# Patient Record
Sex: MALE | Race: WHITE | NOT HISPANIC OR LATINO | Employment: OTHER | URBAN - NONMETROPOLITAN AREA
[De-identification: names, ages, dates, MRNs, and addresses within clinical notes are randomized per-mention and may not be internally consistent; named-entity substitution may affect disease eponyms.]

---

## 2017-01-03 ENCOUNTER — LAB (OUTPATIENT)
Dept: FAMILY MEDICINE CLINIC | Facility: CLINIC | Age: 67
End: 2017-01-03

## 2017-01-03 DIAGNOSIS — Z79.01 LONG TERM (CURRENT) USE OF ANTICOAGULANTS: ICD-10-CM

## 2017-01-03 DIAGNOSIS — Z79.899 ENCOUNTER FOR LONG-TERM (CURRENT) DRUG USE: Primary | ICD-10-CM

## 2017-01-03 LAB
INR PPP: 1.16 (ref 0.8–1.1)
PROTHROMBIN TIME: 13.1 SECONDS (ref 9.8–11.9)

## 2017-01-03 PROCEDURE — 36415 COLL VENOUS BLD VENIPUNCTURE: CPT | Performed by: PHYSICIAN ASSISTANT

## 2017-01-05 ENCOUNTER — TELEPHONE (OUTPATIENT)
Dept: FAMILY MEDICINE CLINIC | Facility: CLINIC | Age: 67
End: 2017-01-05

## 2017-01-05 NOTE — TELEPHONE ENCOUNTER
----- Message from Edy Kline MD sent at 1/4/2017  4:33 PM EST -----  Friday am - thanks    ----- Message -----     From: Thania Marrufo MA     Sent: 1/3/2017  10:44 AM       To: Edy Kline MD    Request you not arpn.. Was in er this weekend and would like to see you Thursday or Friday for a fu regarding khoury requesting gallbladder surgery???

## 2017-01-06 ENCOUNTER — TELEPHONE (OUTPATIENT)
Dept: FAMILY MEDICINE CLINIC | Facility: CLINIC | Age: 67
End: 2017-01-06

## 2017-01-06 NOTE — TELEPHONE ENCOUNTER
I spoke with Mr. Toribio's wife regarding his INR drawn on 1/3/2017 which was a little over 1.1.  She states that he had to go to the emergency room for another issue on Friday and his INR was greater than 4 on 5 mg of Coumadin daily.  He was advised at the emergency room to hold his Coumadin and have it rechecked on Tuesday at which point it was 1.1.  I advised that he restart 4 mg Coumadin daily and recheck INR in 1 week.

## 2017-01-17 ENCOUNTER — OFFICE VISIT (OUTPATIENT)
Dept: FAMILY MEDICINE CLINIC | Facility: CLINIC | Age: 67
End: 2017-01-17

## 2017-01-17 VITALS
TEMPERATURE: 98.6 F | SYSTOLIC BLOOD PRESSURE: 115 MMHG | HEIGHT: 68 IN | BODY MASS INDEX: 27.28 KG/M2 | WEIGHT: 180 LBS | DIASTOLIC BLOOD PRESSURE: 70 MMHG | OXYGEN SATURATION: 96 % | HEART RATE: 95 BPM

## 2017-01-17 DIAGNOSIS — J44.1 CHRONIC OBSTRUCTIVE PULMONARY DISEASE WITH ACUTE EXACERBATION (HCC): Primary | ICD-10-CM

## 2017-01-17 DIAGNOSIS — M54.9 MECHANICAL BACK PAIN: ICD-10-CM

## 2017-01-17 DIAGNOSIS — I10 ESSENTIAL HYPERTENSION: ICD-10-CM

## 2017-01-17 DIAGNOSIS — I25.10 CORONARY ARTERY DISEASE INVOLVING NATIVE CORONARY ARTERY OF NATIVE HEART WITHOUT ANGINA PECTORIS: ICD-10-CM

## 2017-01-17 PROCEDURE — 99214 OFFICE O/P EST MOD 30 MIN: CPT | Performed by: PHYSICIAN ASSISTANT

## 2017-01-17 RX ORDER — PREDNISONE 20 MG/1
40 TABLET ORAL DAILY
Qty: 10 TABLET | Refills: 0 | Status: SHIPPED | OUTPATIENT
Start: 2017-01-17 | End: 2017-02-13

## 2017-01-17 RX ORDER — AZITHROMYCIN 250 MG/1
TABLET, FILM COATED ORAL
Qty: 6 TABLET | Refills: 0 | Status: SHIPPED | OUTPATIENT
Start: 2017-01-17 | End: 2017-02-13

## 2017-01-17 NOTE — MR AVS SNAPSHOT
Adria Zuñiga   1/17/2017 11:20 AM   Office Visit    Dept Phone:  [phone]   Encounter #:  [id number]    Provider:  SHIRA Maravilla   Department:  [facility]                Your Full Care Plan              Today's Medication Changes          These changes are accurate as of: 1/17/17 12:08 PM.  If you have any questions, ask your nurse or doctor.               New Medication(s)Ordered:     azithromycin 250 MG tablet   Commonly known as:  ZITHROMAX   Take 2 tablets the first day, then 1 tablet daily for 4 days.   Started by:  SHIRA Maravilla       Fluticasone Furoate-Vilanterol 100-25 MCG/INH aerosol powder    Commonly known as:  BREO ELLIPTA   1 puff every morning   Started by:  SHIRA Maravilla       predniSONE 20 MG tablet   Commonly known as:  DELTASONE   Take 2 tablets by mouth Daily.   Started by:  SHIRA Maravilla            Where to Get Your Medications      These medications were sent to [address] [place] [address]  [phone] [facility] [phone] [address] [place] [address] [address]     Phone:  [phone]     azithromycin 250 MG tablet    Fluticasone Furoate-Vilanterol 100-25 MCG/INH aerosol powder     predniSONE 20 MG tablet                  Your Updated Medication List          This list is accurate as of: 1/17/17 12:08 PM.  Always use your most recent med list.                aspirin 81 MG chewable tablet   Chew 1 tablet daily.       atorvastatin 80 MG tablet   Commonly known as:  LIPITOR   Take 1 tablet by mouth Every Night.       azithromycin 250 MG tablet   Commonly known as:  ZITHROMAX   Take 2 tablets the first day, then 1 tablet daily for 4 days.       carvedilol 12.5 MG tablet   Commonly known as:  COREG   Take 1 tablet by mouth 2 (Two) Times a Day With Meals.       DULoxetine 60 MG capsule   Commonly known as:  CYMBALTA   Take 1 capsule by mouth  "daily.       esomeprazole 40 MG capsule   Commonly known as:  nexIUM   Take 1 capsule by mouth 2 (two) times a day.       ferrous sulfate 325 (65 FE) MG tablet   Take 1 tablet by mouth 2 (two) times a day.       Fluticasone Furoate-Vilanterol 100-25 MCG/INH aerosol powder    Commonly known as:  BREO ELLIPTA   1 puff every morning       insulin lispro 100 UNIT/ML injection   Commonly known as:  humaLOG   Inject 10 Units under the skin 3 (Three) Times a Day Before Meals.       Insulin Syringe 28G X 1/2\" 0.5 ML misc       Lancet Device [facility]       LANTUS 100 UNIT/ML injection   Generic drug:  insulin glargine   Inject 40 Units under the skin Daily.       metFORMIN 1000 MG tablet   Commonly known as:  GLUCOPHAGE   Take 1 tablet by mouth 2 (two) times a day with meals.       oxyMORphone ER 20 MG tablet extended-release 12 hour 12 hr tablet   Commonly known as:  OPANA ER   Take 1 tablet by mouth 2 (Two) Times a Day.       Pen Needles 5/16\" 30G X 8 MM misc       predniSONE 20 MG tablet   Commonly known as:  DELTASONE   Take 2 tablets by mouth Daily.       spironolactone 25 MG tablet   Commonly known as:  ALDACTONE   Take 1 tablet by mouth daily. 1/2 TABLET EVERY MORNING       * warfarin 4 MG tablet   Commonly known as:  COUMADIN   Take 1 tablet by mouth Daily.       * warfarin 1 MG tablet   Commonly known as:  COUMADIN   Take 1 tablet by mouth Daily.       * Notice:  This list has 2 medication(s) that are the same as other medications prescribed for you. Read the directions carefully, and ask your doctor or other care provider to review them with you.            You Were Diagnosed With        Codes Comments    Chronic obstructive pulmonary disease with acute exacerbation    -  Primary ICD-10-CM: J44.1  ICD-9-CM: 491.21       Instructions     None    Patient Instructions History      Upcoming Appointments     Visit Type Date Time Department    OFFICE VISIT 1/17/2017 11:20 AM [facility]    OFFICE VISIT 2/13/2017  " "1:30 PM [facility]    OFFICE VISIT 3/14/2017 11:20 AM [facility]      MyChart Signup     Our records indicate that you have declined [facility] sign[facility]. If you would like to sign up for [employer], please email [email or web address] or call [phone] to obtain an activation code.             Other Info from Your Visit           Your Appointments     Feb 13, 2017  1:30 PM EST   Office Visit with Edy Kline MD   [facility] (--)    [address] [place] [id number] [phone]           Please arrive 10 minutes early. Bring a complete list of all medications and bring any previous records or diagnostic testing results.            Mar 14, 2017 11:20 AM EDT   Office Visit with SHIRA Maravilla   [facility] (--)    [address] [place] [id number] [phone]           Please arrive 10 minutes early, bring a complete list of all medications and bring any previous records or diagnostic testing results.              Allergies     No Known Allergies      Vital Signs     Blood Pressure Pulse Temperature Height Weight Oxygen Saturation    115/70 (BP Location: Left arm, Patient Position: Sitting, Cuff Size: Adult) 95 98.6 °F (37 °C) (Oral) 67.5\" (171.5 cm) 180 lb (81.6 kg) 96%    Body Mass Index Smoking Status                27.78 kg/m2 Current Every Day Smoker          Problems and Diagnoses Noted     Chronic obstructive pulmonary disease with acute exacerbation        "

## 2017-01-17 NOTE — PROGRESS NOTES
"Katie Zuñiga is a 66 y.o. male.     History of Present Illness     Acute illness-  He complains of productive cough with thick green sputum, chest and nasal congestion, headache, and body aches for the past 2 weeks.    COPD-acutely exacerbated    Chronic back pain -  Pain is not related to a specific injury.  Symptoms include back pain,stiffness, and decreased ROJM.  He denies urinary incontinence or fecal incontinence.  Pain is located int he low back.  Radiation to left leg. Pain is described as varying from mild to severe burning and throbbing. Onset x years.  Symptoms exacerbated by standing and walking.  Symptoms alleviated by rest and opioid analgesics.  Associated symptoms include sexual dysfunction.  Pain causes functional limitation including general activity, mood, walking ability, work, and activities of daily living.    9/4/09 MRI lumbar spine = disc bulging L4/5 and L5/S1 with central protrusion of disc and facet arthropathy involving L3-S1    CAD - stable    Hypertension - stable    Visit Vitals   • /70 (BP Location: Left arm, Patient Position: Sitting, Cuff Size: Adult)   • Pulse 95   • Temp 98.6 °F (37 °C) (Oral)   • Ht 67.5\" (171.5 cm)   • Wt 180 lb (81.6 kg)   • SpO2 96%   • BMI 27.78 kg/m2       The following portions of the patient's history were reviewed and updated as appropriate: allergies, current medications, past family history, past medical history, past social history, past surgical history and problem list.    Review of Systems   Constitutional: Negative for activity change, appetite change and fever.   HENT: Positive for congestion. Negative for ear pain, sinus pressure and sore throat.    Eyes: Negative for pain and visual disturbance.   Respiratory: Positive for cough and wheezing. Negative for chest tightness.    Cardiovascular: Negative for chest pain and palpitations.   Gastrointestinal: Negative for abdominal pain, constipation, diarrhea, nausea and vomiting. " "  Endocrine: Negative for polydipsia and polyuria.   Genitourinary: Negative for dysuria and frequency.   Musculoskeletal: Positive for back pain. Negative for myalgias.   Skin: Negative for color change and rash.             Allergic/Immunologic: Negative for food allergies and immunocompromised state.   Neurological: Negative for dizziness, syncope and headaches.   Hematological: Negative for adenopathy. Does not bruise/bleed easily.   Psychiatric/Behavioral: Negative for hallucinations and suicidal ideas. The patient is not nervous/anxious.        Visit Vitals   • /70 (BP Location: Left arm, Patient Position: Sitting, Cuff Size: Adult)   • Pulse 95   • Temp 98.6 °F (37 °C) (Oral)   • Ht 67.5\" (171.5 cm)   • Wt 180 lb (81.6 kg)   • SpO2 96%   • BMI 27.78 kg/m2       Objective   Physical Exam   Constitutional: He is oriented to person, place, and time. He appears well-developed and well-nourished.   HENT:   Head: Normocephalic and atraumatic.   Nose: Nose normal.   Mouth/Throat: Oropharynx is clear and moist.   Eyes: Conjunctivae and EOM are normal. Pupils are equal, round, and reactive to light.   Neck: Normal range of motion. Neck supple. No tracheal deviation present. No thyromegaly present.   Cardiovascular: Normal rate, regular rhythm, normal heart sounds and intact distal pulses.    No murmur heard.  Pulmonary/Chest: Effort normal. No respiratory distress. He has wheezes (bilateral).   Abdominal: Soft. Bowel sounds are normal. There is no tenderness. There is no guarding.   Musculoskeletal: Normal range of motion. He exhibits tenderness (lumbar musculature diffusely tender). He exhibits no edema.   Lymphadenopathy:     He has no cervical adenopathy.   Neurological: He is alert and oriented to person, place, and time.   Skin: Skin is warm and dry. No rash noted.   Psychiatric: He has a normal mood and affect. His behavior is normal.   Nursing note and vitals reviewed.      Assessment/Plan   Diagnoses and " all orders for this visit:    Chronic obstructive pulmonary disease with acute exacerbation  -     azithromycin (ZITHROMAX) 250 MG tablet; Take 2 tablets the first day, then 1 tablet daily for 4 days.  -     predniSONE (DELTASONE) 20 MG tablet; Take 2 tablets by mouth Daily.  -     Fluticasone Furoate-Vilanterol (BREO ELLIPTA) 100-25 MCG/INH aerosol powder ; 1 puff every morning    Mechanical back pain    Coronary artery disease involving native coronary artery of native heart without angina pectoris    Essential hypertension    Refill Opana ER 20 mg 1 tablet twice a day #60 no refills   written by Dr. Raisa Kirk #[id number] Reviewed and is consistent.  UDS 12/20/2016 reviewed and is consistent.  Patient comfort assessment guide reviewed and updated today.    As part of the patient's treatment plan they are being prescribed a controlled substance/ substances with potential for abuse.  This patient has been made aware of the appropriate use of such medications, including potential risk of somnolence, limited ability to drive and/or work safely, and potential for overdose.  It has also been made clear these medications are for use by the patient only, without concomitant use of alcohol or other substances unless prescribed/advised by medical provider.    Patient has completed prescribing agreement detailing terms of continued prescribing of controlled substances including monitoring DANAE reports, urine drug screens, and pill counts.  The patient is aware DANAE reports are reviewed on a regular basis and scanned into the chart.    History and physical exam exhibit continued safe and appropriate use of controlled substances.

## 2017-02-09 ENCOUNTER — LAB (OUTPATIENT)
Dept: FAMILY MEDICINE CLINIC | Facility: CLINIC | Age: 67
End: 2017-02-09

## 2017-02-09 DIAGNOSIS — D50.9 IRON DEFICIENCY ANEMIA, UNSPECIFIED: Primary | ICD-10-CM

## 2017-02-09 DIAGNOSIS — R79.9 ABNORMAL BLOOD CHEMISTRY: ICD-10-CM

## 2017-02-09 DIAGNOSIS — Z79.01 LONG TERM (CURRENT) USE OF ANTICOAGULANTS: ICD-10-CM

## 2017-02-09 PROCEDURE — 36415 COLL VENOUS BLD VENIPUNCTURE: CPT | Performed by: GENERAL PRACTICE

## 2017-02-10 LAB
BASOPHILS # BLD AUTO: 0.01 10*3/MM3 (ref 0–0.3)
BASOPHILS NFR BLD AUTO: 0.2 % (ref 0–2)
EOSINOPHIL # BLD AUTO: 0.11 10*3/MM3 (ref 0–0.7)
EOSINOPHIL NFR BLD AUTO: 1.7 % (ref 0–7)
ERYTHROCYTE [DISTWIDTH] IN BLOOD BY AUTOMATED COUNT: 22.7 % (ref 11.5–14.5)
FERRITIN SERPL-MCNC: 20 NG/ML (ref 21.9–321.7)
HCT VFR BLD AUTO: 35.1 % (ref 42–52)
HGB BLD-MCNC: 9.6 G/DL (ref 14–18)
IMM GRANULOCYTES # BLD: 0.03 10*3/MM3 (ref 0–0.03)
IMM GRANULOCYTES NFR BLD: 0.5 % (ref 0–0.5)
INR PPP: 2.04 (ref 0.8–1.1)
IRON SATN MFR SERPL: 8 % (ref 20–50)
IRON SERPL-MCNC: 32 MCG/DL (ref 53–167)
LYMPHOCYTES # BLD AUTO: 1.14 10*3/MM3 (ref 1–3)
LYMPHOCYTES NFR BLD AUTO: 17.8 % (ref 16–46)
MCH RBC QN AUTO: 19.2 PG (ref 27–33)
MCHC RBC AUTO-ENTMCNC: 27.4 G/DL (ref 33–37)
MCV RBC AUTO: 70.3 FL (ref 80–94)
MONOCYTES # BLD AUTO: 0.67 10*3/MM3 (ref 0.1–0.9)
MONOCYTES NFR BLD AUTO: 10.5 % (ref 0–12)
NEUTROPHILS # BLD AUTO: 4.44 10*3/MM3 (ref 1.4–6.5)
NEUTROPHILS NFR BLD AUTO: 69.3 % (ref 40–75)
PLATELET # BLD AUTO: 372 10*3/MM3 (ref 130–400)
PLATELET BLD QL SMEAR: NORMAL
PROTHROMBIN TIME: 23.2 SECONDS (ref 9.8–11.9)
RBC # BLD AUTO: 4.99 10*6/MM3 (ref 4.7–6.1)
RBC MORPH BLD: NORMAL
TIBC SERPL-MCNC: 422 MCG/DL (ref 241–421)
TRANSFERRIN SERPL-MCNC: 315 MG/DL (ref 200–370)
UIBC SERPL-MCNC: 390 MCG/DL (ref 112–346)
WBC # BLD AUTO: 6.4 10*3/MM3 (ref 4.5–12.5)

## 2017-02-11 RX ORDER — FERROUS SULFATE 325(65) MG
325 TABLET ORAL
Qty: 90 TABLET | Refills: 5 | Status: SHIPPED | OUTPATIENT
Start: 2017-02-11 | End: 2017-08-18 | Stop reason: SDUPTHER

## 2017-02-13 ENCOUNTER — OFFICE VISIT (OUTPATIENT)
Dept: FAMILY MEDICINE CLINIC | Facility: CLINIC | Age: 67
End: 2017-02-13

## 2017-02-13 VITALS
OXYGEN SATURATION: 99 % | BODY MASS INDEX: 27.13 KG/M2 | SYSTOLIC BLOOD PRESSURE: 120 MMHG | TEMPERATURE: 97.7 F | HEIGHT: 68 IN | RESPIRATION RATE: 12 BRPM | WEIGHT: 179 LBS | HEART RATE: 102 BPM | DIASTOLIC BLOOD PRESSURE: 65 MMHG

## 2017-02-13 DIAGNOSIS — I10 ESSENTIAL HYPERTENSION: Primary | ICD-10-CM

## 2017-02-13 DIAGNOSIS — J30.9 CHRONIC ALLERGIC RHINITIS: ICD-10-CM

## 2017-02-13 DIAGNOSIS — E78.2 MIXED HYPERLIPIDEMIA: ICD-10-CM

## 2017-02-13 DIAGNOSIS — F17.200 SMOKER: ICD-10-CM

## 2017-02-13 DIAGNOSIS — E11.42 TYPE 2 DIABETES MELLITUS WITH PERIPHERAL NEUROPATHY (HCC): ICD-10-CM

## 2017-02-13 DIAGNOSIS — D50.8 OTHER IRON DEFICIENCY ANEMIA: ICD-10-CM

## 2017-02-13 DIAGNOSIS — K21.9 GASTROESOPHAGEAL REFLUX DISEASE WITHOUT ESOPHAGITIS: ICD-10-CM

## 2017-02-13 DIAGNOSIS — N52.01 ERECTILE DYSFUNCTION DUE TO ARTERIAL INSUFFICIENCY: ICD-10-CM

## 2017-02-13 DIAGNOSIS — K57.30 DIVERTICULOSIS OF LARGE INTESTINE WITHOUT HEMORRHAGE: ICD-10-CM

## 2017-02-13 DIAGNOSIS — J44.1 CHRONIC OBSTRUCTIVE PULMONARY DISEASE WITH ACUTE EXACERBATION (HCC): ICD-10-CM

## 2017-02-13 DIAGNOSIS — I25.10 CORONARY ARTERY DISEASE INVOLVING NATIVE CORONARY ARTERY OF NATIVE HEART WITHOUT ANGINA PECTORIS: ICD-10-CM

## 2017-02-13 DIAGNOSIS — M54.9 MECHANICAL BACK PAIN: ICD-10-CM

## 2017-02-13 DIAGNOSIS — F41.9 CHRONIC ANXIETY: ICD-10-CM

## 2017-02-13 DIAGNOSIS — I50.22 CHRONIC SYSTOLIC CONGESTIVE HEART FAILURE (HCC): ICD-10-CM

## 2017-02-13 DIAGNOSIS — E55.9 VITAMIN D DEFICIENCY: ICD-10-CM

## 2017-02-13 PROCEDURE — 99214 OFFICE O/P EST MOD 30 MIN: CPT | Performed by: GENERAL PRACTICE

## 2017-02-13 NOTE — PROGRESS NOTES
Katie Zuñiga is a 66 y.o. male.     History of Present Illness     Congestive Heart Failure  Patient has a history of congestive heart failure with an estimated EF of 10-20% complicated by a previous apical mural thrombus.. Patient's current complaints are dyspnea with exertion and fatigue. He denies dyspnea at rest, orthopnea, paroxysmal nocturnal dyspnea, chest pain and palpitations. Current medications include beta-blocker, spironolactone, and coumadin. He states he is compliant most of the time with his medications. He states he is noncompliant some of the time with his diet. He has been unable to follow-up with [facility] cardiology but apparently has an appointment scheduled for early next week.  Most recent hemoglobin 9.6 with an MCV of 70.3, serum iron of 32, and ferritin of 20.  He does not appear to be taking his iron supplementation at present    Coronary artery disease  He has a history of CABG and CHF. Previous diagnostic testing includes: cardiac catheterization Recent history: taking medications as instructed, no medication side effects noted, no TIA's, no chest pain on exertion, notes stable dyspnea on exertion, no change and no swelling of ankles. Patient's symptoms have been unchanged. Medication side effects include: none.    Diabetes  Current symptoms include none. Patient denies paresthesia of the feet, visual disturbances, polydipsia, polyuria, hypoglycemia and foot ulcerations. Evaluation to date has been: fasting blood sugar and hemoglobin A1C. Home sugars: BGs are high in the evening. Current treatments:metformin, basal insulin - lantus, and bolus insulin - humalog. He generally eats cheerios for breakfast and then snacks on chips and popcorn the remainder of the day. Last dilated eye exam within one year.      Dyslipidemia  Compliance with treatment has been poor. The patient exercises infrequently. He is currently being prescribed the following medication for his dyslipidemia -  atorvastatin.     The following portions of the patient's history were reviewed and updated as appropriate: allergies, current medications, past medical history, past social history, past surgical history and problem list.    Review of Systems   Constitutional: Positive for fatigue. Negative for appetite change, chills, fever and unexpected weight change.   HENT: Negative for congestion, ear pain, rhinorrhea, sneezing, sore throat and voice change.    Eyes: Negative for visual disturbance.   Respiratory: Positive for cough, shortness of breath and wheezing.    Cardiovascular: Negative for chest pain, palpitations and leg swelling.   Gastrointestinal: Negative for abdominal pain, blood in stool, constipation, diarrhea, nausea and vomiting.   Endocrine: Negative for polydipsia and polyuria.   Genitourinary: Negative for difficulty urinating, dysuria, frequency, hematuria and urgency.   Musculoskeletal: Positive for back pain. Negative for arthralgias, joint swelling, myalgias and neck pain.   Skin: Negative for color change.   Neurological: Negative for tremors, weakness, numbness and headaches.   Psychiatric/Behavioral: Negative for dysphoric mood, sleep disturbance and suicidal ideas. The patient is not nervous/anxious.      Objective   Physical Exam   Constitutional: He is oriented to person, place, and time. He appears well-developed and well-nourished. He is cooperative. He does not have a sickly appearance. No distress.   In fair spirits.  No apparent distress.  No pallor, cyanosis, diaphoresis, or jaundice.   HENT:   Head: Atraumatic.   Right Ear: Tympanic membrane, external ear and ear canal normal.   Left Ear: Tympanic membrane, external ear and ear canal normal.   Mouth/Throat: Oropharynx is clear and moist.   Eyes: EOM are normal. Pupils are equal, round, and reactive to light. No scleral icterus.   Neck: No JVD present. Carotid bruit is not present. No tracheal deviation present. No thyromegaly present.    Cardiovascular: Normal rate, regular rhythm, S1 normal, S2 normal, normal heart sounds and intact distal pulses.  Exam reveals no gallop.    No murmur heard.  Pulmonary/Chest: No accessory muscle usage. No respiratory distress. He has decreased breath sounds in the right lower field and the left lower field. He has wheezes (diffuse-primarily with forced expiration). He has no rales.   Mild pulmonary hyperinflation   Abdominal: Soft. Normal aorta and bowel sounds are normal. He exhibits no abdominal bruit and no mass. There is no hepatosplenomegaly. There is no tenderness. No hernia.   Musculoskeletal: He exhibits no tenderness or deformity.        Lumbar back: He exhibits decreased range of motion. He exhibits no tenderness and no deformity.   Negative straight leg raise   Lymphadenopathy:        Head (right side): No submandibular adenopathy present.        Head (left side): No submandibular adenopathy present.     He has no cervical adenopathy.   Neurological: He is alert and oriented to person, place, and time. He has normal strength and normal reflexes. He displays normal reflexes. A sensory deficit (decreased vibration sense both feet) is present. No cranial nerve deficit. He exhibits normal muscle tone. Coordination normal.   Skin: Skin is warm and dry. No rash noted. He is not diaphoretic. No pallor. Nails show no clubbing.   Psychiatric: He has a normal mood and affect. His behavior is normal.     Assessment/Plan   Problems Addressed this Visit        Cardiovascular and Mediastinum    Essential hypertension - Primary  Hypertension: at goal. Evidence of target organ damage: angina/ prior myocardial infarction, heart failure and prior coronary revascularization.  Encouraged to continue to work on diet and exercise plan.   Continue current medication  Fasting lab work will be updated in 8 weeks    Relevant Orders    Comprehensive Metabolic Panel    Mixed hyperlipidemia  As above. Continue current medication.     Relevant Orders    Lipid Panel    TSH    CAD (coronary artery disease)  Coronary artery disease is stable.  Reminded regarding the importance of lifestyle modification.  Stop smoking.  Continue current treatment.    Chronic systolic congestive heart failure  Congestive heart failure due to coronary artery disease (CAD) and systolic dysfunction and has been complicated by a previous apical wall mural thrombus.  Reminded regarding the importance of lifestyle modification.  Recommended a trial of cardiopulmonary rehabilitation.  Patient would like to consider  Salt avoidance.  Stop smoking.  Continue current treatment.  Reminded to follow up with cardiology particularly for an opinion as to whether coumadin needs to be continued.  Will consider resuming an ACE at his return       Respiratory    Chronic obstructive pulmonary disease with acute exacerbation  COPD is stable.  Reminded of the importance of smoking cessation  Encouraged to remain as active as symptoms allow for    Chronic allergic rhinitis       Digestive    Vitamin D deficiency    Relevant Orders    Vitamin D 25 Hydroxy    Gastroesophageal reflux disease without esophagitis    Diverticulosis of large intestine       Endocrine    Type 2 diabetes mellitus with peripheral neuropathy  Diabetes mellitus Type II, under unsatisfactory control.   Encouraged to continue to pursue ADA diet  Encouraged aerobic exercise.  Reminded to get yearly retinal exam.    Relevant Orders    Hemoglobin A1c    MicroAlbumin, Urine, Random       Nervous and Auditory    Mechanical back pain  Reminded regarding symptomatic treatment.   Will continue current treatment.    Relevant Medications    oxyMORphone ER (OPANA ER) 20 MG tablet extended-release 12 hour 12 hr tablet       Hematopoietic and Hemostatic    Iron deficiency anemia  Encouraged to resume iron supplementation     Relevant Orders    CBC & Differential    Iron    Transferrin    Ferritin       Other    Chronic anxiety     Vasculogenic erectile dysfunction    Smoker

## 2017-02-16 NOTE — PROGRESS NOTES
Called pt about the following per dr. Kline. VH    -- Mr varghese should increase his ferrous sulfate to tid

## 2017-03-07 DIAGNOSIS — E11.42 TYPE 2 DIABETES MELLITUS WITH PERIPHERAL NEUROPATHY (HCC): ICD-10-CM

## 2017-03-07 DIAGNOSIS — F41.9 CHRONIC ANXIETY: ICD-10-CM

## 2017-03-08 RX ORDER — DULOXETIN HYDROCHLORIDE 60 MG/1
60 CAPSULE, DELAYED RELEASE ORAL DAILY
Qty: 30 CAPSULE | Refills: 5 | Status: SHIPPED | OUTPATIENT
Start: 2017-03-08 | End: 2017-08-18 | Stop reason: SDUPTHER

## 2017-03-13 DIAGNOSIS — M54.9 MECHANICAL BACK PAIN: ICD-10-CM

## 2017-03-14 ENCOUNTER — OFFICE VISIT (OUTPATIENT)
Dept: FAMILY MEDICINE CLINIC | Facility: CLINIC | Age: 67
End: 2017-03-14

## 2017-03-14 VITALS
HEART RATE: 83 BPM | OXYGEN SATURATION: 98 % | BODY MASS INDEX: 29.1 KG/M2 | WEIGHT: 192 LBS | DIASTOLIC BLOOD PRESSURE: 74 MMHG | TEMPERATURE: 98 F | HEIGHT: 68 IN | SYSTOLIC BLOOD PRESSURE: 128 MMHG

## 2017-03-14 DIAGNOSIS — I10 ESSENTIAL HYPERTENSION: ICD-10-CM

## 2017-03-14 DIAGNOSIS — Z79.899 LONG TERM USE OF DRUG: ICD-10-CM

## 2017-03-14 DIAGNOSIS — M54.9 MECHANICAL BACK PAIN: Primary | ICD-10-CM

## 2017-03-14 DIAGNOSIS — I25.10 CORONARY ARTERY DISEASE INVOLVING NATIVE CORONARY ARTERY OF NATIVE HEART WITHOUT ANGINA PECTORIS: ICD-10-CM

## 2017-03-14 PROCEDURE — 99214 OFFICE O/P EST MOD 30 MIN: CPT | Performed by: PHYSICIAN ASSISTANT

## 2017-03-14 NOTE — PROGRESS NOTES
"Katie Zuñiga is a 66 y.o. male.     History of Present Illness     Chronic back pain -  Pain is not related to a specific injury.  Symptoms include back pain,stiffness, and decreased ROJM.  He denies urinary incontinence or fecal incontinence.  Pain is located int he low back.  Radiation to left leg. Pain is described as varying from mild to severe burning and throbbing. Onset x years.  Symptoms exacerbated by standing and walking.  Symptoms alleviated by rest and opioid analgesics.  Associated symptoms include sexual dysfunction.  Pain causes functional limitation including general activity, mood, walking ability, work, and activities of daily living.    9/4/09 MRI lumbar spine = disc bulging L4/5 and L5/S1 with central protrusion of disc and facet arthropathy involving L3-S1    CAD - stable    Hypertension - stable    Visit Vitals   • /74 (BP Location: Left arm, Patient Position: Sitting, Cuff Size: Adult)   • Pulse 83   • Temp 98 °F (36.7 °C) (Oral)   • Ht 67.5\" (171.5 cm)   • Wt 192 lb (87.1 kg)   • SpO2 98%   • BMI 29.63 kg/m2       The following portions of the patient's history were reviewed and updated as appropriate: allergies, current medications, past family history, past medical history, past social history, past surgical history and problem list.    Review of Systems   Constitutional: Negative for activity change, appetite change and fever.   HENT: Negative for ear pain, sinus pressure and sore throat.    Eyes: Negative for pain and visual disturbance.   Respiratory: Negative for cough and chest tightness.    Cardiovascular: Negative for chest pain and palpitations.   Gastrointestinal: Negative for abdominal pain, constipation, diarrhea, nausea and vomiting.   Endocrine: Negative for polydipsia and polyuria.   Genitourinary: Negative for dysuria and frequency.   Musculoskeletal: Positive for back pain. Negative for myalgias.   Skin: Negative for color change and rash.           " "  Allergic/Immunologic: Negative for food allergies and immunocompromised state.   Neurological: Negative for dizziness, syncope and headaches.   Hematological: Negative for adenopathy. Does not bruise/bleed easily.   Psychiatric/Behavioral: Negative for hallucinations and suicidal ideas. The patient is not nervous/anxious.        Visit Vitals   • /74 (BP Location: Left arm, Patient Position: Sitting, Cuff Size: Adult)   • Pulse 83   • Temp 98 °F (36.7 °C) (Oral)   • Ht 67.5\" (171.5 cm)   • Wt 192 lb (87.1 kg)   • SpO2 98%   • BMI 29.63 kg/m2       Objective   Physical Exam   Constitutional: He is oriented to person, place, and time. He appears well-developed and well-nourished.   HENT:   Head: Normocephalic and atraumatic.   Nose: Nose normal.   Mouth/Throat: Oropharynx is clear and moist.   Eyes: Conjunctivae and EOM are normal. Pupils are equal, round, and reactive to light.   Neck: Normal range of motion. Neck supple. No tracheal deviation present. No thyromegaly present.   Cardiovascular: Normal rate, regular rhythm, normal heart sounds and intact distal pulses.    No murmur heard.  Pulmonary/Chest: Effort normal and breath sounds normal. No respiratory distress. He has no wheezes.   Abdominal: Soft. Bowel sounds are normal. There is no tenderness. There is no guarding.   Musculoskeletal: Normal range of motion. He exhibits tenderness (lumbar musculature diffusely tender). He exhibits no edema.   Lymphadenopathy:     He has no cervical adenopathy.   Neurological: He is alert and oriented to person, place, and time.   Skin: Skin is warm and dry. No rash noted.   Psychiatric: He has a normal mood and affect. His behavior is normal.   Nursing note and vitals reviewed.      Assessment/Plan   Diagnoses and all orders for this visit:    Mechanical back pain    Long term use of drug  -     Urine Drug Screen; Future    Coronary artery disease involving native coronary artery of native heart without angina " pectoris    Essential hypertension      Refill Opana ER 20 mg 1 tablet twice a day #60 no refills   written by Dr. Raisa Kirk #[id number]  Reviewed and is consistent.  UDS 12/20/16 reviewed and is consistent.  Patient comfort assessment guide reviewed and updated today.    As part of the patient's treatment plan they are being prescribed a controlled substance/ substances with potential for abuse.  This patient has been made aware of the appropriate use of such medications, including potential risk of somnolence, limited ability to drive and/or work safely, and potential for overdose.  It has also been made clear these medications are for use by the patient only, without concomitant use of alcohol or other substances unless prescribed/advised by medical provider.    Patient has completed prescribing agreement detailing terms of continued prescribing of controlled substances including monitoring DANAE reports, urine drug screens, and pill counts.  The patient is aware DANAE reports are reviewed on a regular basis and scanned into the chart.    History and physical exam exhibit continued safe and appropriate use of controlled substances.

## 2017-03-17 DIAGNOSIS — K21.9 GASTROESOPHAGEAL REFLUX DISEASE WITHOUT ESOPHAGITIS: ICD-10-CM

## 2017-03-17 RX ORDER — ESOMEPRAZOLE MAGNESIUM 40 MG/1
40 CAPSULE, DELAYED RELEASE ORAL 2 TIMES DAILY
Qty: 60 CAPSULE | Refills: 5 | Status: SHIPPED | OUTPATIENT
Start: 2017-03-17 | End: 2017-08-18 | Stop reason: SDUPTHER

## 2017-03-20 DIAGNOSIS — I25.10 CORONARY ARTERY DISEASE INVOLVING NATIVE CORONARY ARTERY OF NATIVE HEART WITHOUT ANGINA PECTORIS: Primary | ICD-10-CM

## 2017-03-20 DIAGNOSIS — I50.22 CHRONIC SYSTOLIC CONGESTIVE HEART FAILURE (HCC): ICD-10-CM

## 2017-03-31 DIAGNOSIS — I50.22 CHRONIC SYSTOLIC CONGESTIVE HEART FAILURE (HCC): ICD-10-CM

## 2017-03-31 RX ORDER — SPIRONOLACTONE 25 MG/1
25 TABLET ORAL DAILY
Qty: 15 TABLET | Refills: 5
Start: 2017-03-31 | End: 2017-08-18 | Stop reason: SDUPTHER

## 2017-04-10 DIAGNOSIS — M54.9 MECHANICAL BACK PAIN: ICD-10-CM

## 2017-04-11 ENCOUNTER — OFFICE VISIT (OUTPATIENT)
Dept: FAMILY MEDICINE CLINIC | Facility: CLINIC | Age: 67
End: 2017-04-11

## 2017-04-11 ENCOUNTER — LAB (OUTPATIENT)
Dept: FAMILY MEDICINE CLINIC | Facility: CLINIC | Age: 67
End: 2017-04-11

## 2017-04-11 VITALS
BODY MASS INDEX: 26.07 KG/M2 | HEIGHT: 68 IN | SYSTOLIC BLOOD PRESSURE: 126 MMHG | WEIGHT: 172 LBS | OXYGEN SATURATION: 98 % | TEMPERATURE: 97.4 F | DIASTOLIC BLOOD PRESSURE: 76 MMHG | HEART RATE: 66 BPM

## 2017-04-11 DIAGNOSIS — M54.9 MECHANICAL BACK PAIN: Primary | ICD-10-CM

## 2017-04-11 DIAGNOSIS — E78.2 MIXED HYPERLIPIDEMIA: ICD-10-CM

## 2017-04-11 DIAGNOSIS — D50.8 OTHER IRON DEFICIENCY ANEMIA: ICD-10-CM

## 2017-04-11 DIAGNOSIS — I25.10 CORONARY ARTERY DISEASE INVOLVING NATIVE CORONARY ARTERY OF NATIVE HEART WITHOUT ANGINA PECTORIS: ICD-10-CM

## 2017-04-11 DIAGNOSIS — E55.9 VITAMIN D DEFICIENCY: ICD-10-CM

## 2017-04-11 DIAGNOSIS — Z79.899 LONG TERM USE OF DRUG: ICD-10-CM

## 2017-04-11 DIAGNOSIS — E11.42 TYPE 2 DIABETES MELLITUS WITH PERIPHERAL NEUROPATHY (HCC): ICD-10-CM

## 2017-04-11 DIAGNOSIS — I10 ESSENTIAL HYPERTENSION: ICD-10-CM

## 2017-04-11 LAB
25(OH)D3 SERPL-MCNC: 23 NG/ML
ALBUMIN SERPL-MCNC: 4.6 G/DL (ref 3.4–4.8)
ALBUMIN/GLOB SERPL: 1.4 G/DL (ref 1.5–2.5)
ALP SERPL-CCNC: 64 U/L (ref 40–129)
ALT SERPL W P-5'-P-CCNC: 38 U/L (ref 10–44)
ANION GAP SERPL CALCULATED.3IONS-SCNC: 6.7 MMOL/L (ref 3.6–11.2)
ANISOCYTOSIS BLD QL: NORMAL
AST SERPL-CCNC: 33 U/L (ref 10–34)
BASOPHILS # BLD AUTO: 0.01 10*3/MM3 (ref 0–0.3)
BASOPHILS NFR BLD AUTO: 0.2 % (ref 0–2)
BILIRUB SERPL-MCNC: 0.4 MG/DL (ref 0.2–1.8)
BUN BLD-MCNC: 31 MG/DL (ref 7–21)
BUN/CREAT SERPL: 22 (ref 7–25)
CALCIUM SPEC-SCNC: 10.4 MG/DL (ref 7.7–10)
CHLORIDE SERPL-SCNC: 96 MMOL/L (ref 99–112)
CHOLEST SERPL-MCNC: 305 MG/DL (ref 0–200)
CO2 SERPL-SCNC: 32.3 MMOL/L (ref 24.3–31.9)
CREAT BLD-MCNC: 1.41 MG/DL (ref 0.43–1.29)
DEPRECATED RDW RBC AUTO: 58.6 FL (ref 37–54)
EOSINOPHIL # BLD AUTO: 0.24 10*3/MM3 (ref 0–0.7)
EOSINOPHIL NFR BLD AUTO: 3.6 % (ref 0–7)
ERYTHROCYTE [DISTWIDTH] IN BLOOD BY AUTOMATED COUNT: 23.2 % (ref 11.5–14.5)
FERRITIN SERPL-MCNC: 27 NG/ML (ref 21.9–321.7)
GFR SERPL CREATININE-BSD FRML MDRD: 50 ML/MIN/1.73
GLOBULIN UR ELPH-MCNC: 3.4 GM/DL
GLUCOSE BLD-MCNC: 219 MG/DL (ref 70–110)
HBA1C MFR BLD: 8.9 % (ref 4.5–5.7)
HCT VFR BLD AUTO: 47.5 % (ref 42–52)
HDLC SERPL-MCNC: 40 MG/DL (ref 60–100)
HGB BLD-MCNC: 13.9 G/DL (ref 14–18)
HYPOCHROMIA BLD QL: NORMAL
IMM GRANULOCYTES # BLD: 0.02 10*3/MM3 (ref 0–0.03)
IMM GRANULOCYTES NFR BLD: 0.3 % (ref 0–0.5)
IRON 24H UR-MRATE: 43 MCG/DL (ref 53–167)
LDLC SERPL CALC-MCNC: ABNORMAL MG/DL (ref 0–100)
LDLC/HDLC SERPL: ABNORMAL {RATIO}
LYMPHOCYTES # BLD AUTO: 1.68 10*3/MM3 (ref 1–3)
LYMPHOCYTES NFR BLD AUTO: 25.5 % (ref 16–46)
MCH RBC QN AUTO: 21.4 PG (ref 27–33)
MCHC RBC AUTO-ENTMCNC: 29.3 G/DL (ref 33–37)
MCV RBC AUTO: 73 FL (ref 80–94)
MICROCYTES BLD QL: NORMAL
MONOCYTES # BLD AUTO: 0.62 10*3/MM3 (ref 0.1–0.9)
MONOCYTES NFR BLD AUTO: 9.4 % (ref 0–12)
NEUTROPHILS # BLD AUTO: 4.01 10*3/MM3 (ref 1.4–6.5)
NEUTROPHILS NFR BLD AUTO: 61 % (ref 40–75)
OSMOLALITY SERPL CALC.SUM OF ELEC: 283.3 MOSM/KG (ref 273–305)
PLAT MORPH BLD: NORMAL
PLATELET # BLD AUTO: 272 10*3/MM3 (ref 130–400)
PMV BLD AUTO: 10.9 FL (ref 6–10)
POIKILOCYTOSIS BLD QL SMEAR: NORMAL
POTASSIUM BLD-SCNC: 4.4 MMOL/L (ref 3.5–5.3)
PROT SERPL-MCNC: 8 G/DL (ref 6–8)
RBC # BLD AUTO: 6.51 10*6/MM3 (ref 4.7–6.1)
SODIUM BLD-SCNC: 135 MMOL/L (ref 135–153)
TRIGL SERPL-MCNC: 427 MG/DL (ref 0–150)
TSH SERPL DL<=0.05 MIU/L-ACNC: 3.09 MIU/ML (ref 0.55–4.78)
VLDLC SERPL-MCNC: ABNORMAL MG/DL
WBC NRBC COR # BLD: 6.58 10*3/MM3 (ref 4.5–12.5)

## 2017-04-11 PROCEDURE — 80061 LIPID PANEL: CPT | Performed by: GENERAL PRACTICE

## 2017-04-11 PROCEDURE — 80053 COMPREHEN METABOLIC PANEL: CPT | Performed by: GENERAL PRACTICE

## 2017-04-11 PROCEDURE — 84466 ASSAY OF TRANSFERRIN: CPT | Performed by: GENERAL PRACTICE

## 2017-04-11 PROCEDURE — 85007 BL SMEAR W/DIFF WBC COUNT: CPT | Performed by: GENERAL PRACTICE

## 2017-04-11 PROCEDURE — 82306 VITAMIN D 25 HYDROXY: CPT | Performed by: GENERAL PRACTICE

## 2017-04-11 PROCEDURE — 83036 HEMOGLOBIN GLYCOSYLATED A1C: CPT | Performed by: GENERAL PRACTICE

## 2017-04-11 PROCEDURE — 83540 ASSAY OF IRON: CPT | Performed by: GENERAL PRACTICE

## 2017-04-11 PROCEDURE — 99214 OFFICE O/P EST MOD 30 MIN: CPT | Performed by: PHYSICIAN ASSISTANT

## 2017-04-11 PROCEDURE — 85025 COMPLETE CBC W/AUTO DIFF WBC: CPT | Performed by: GENERAL PRACTICE

## 2017-04-11 PROCEDURE — 84443 ASSAY THYROID STIM HORMONE: CPT | Performed by: GENERAL PRACTICE

## 2017-04-11 PROCEDURE — 36415 COLL VENOUS BLD VENIPUNCTURE: CPT

## 2017-04-11 PROCEDURE — 82728 ASSAY OF FERRITIN: CPT | Performed by: GENERAL PRACTICE

## 2017-04-12 LAB — TRANSFERRIN SERPL-MCNC: 307 MG/DL (ref 200–370)

## 2017-04-16 NOTE — PROGRESS NOTES
"Katie Zuñiga is a 66 y.o. male.     History of Present Illness     Chronic back pain -  Pain is not related to a specific injury.  Symptoms include back pain,stiffness, and decreased ROJM.  He denies urinary incontinence or fecal incontinence.  Pain is located int he low back.  Radiation to left leg. Pain is described as varying from mild to severe burning and throbbing. Onset x years.  Symptoms exacerbated by standing and walking.  Symptoms alleviated by rest and opioid analgesics.  Associated symptoms include sexual dysfunction.  Pain causes functional limitation including general activity, mood, walking ability, work, and activities of daily living.    9/4/09 MRI lumbar spine = disc bulging L4/5 and L5/S1 with central protrusion of disc and facet arthropathy involving L3-S1    CAD - stable    Hypertension - stable    /76 (BP Location: Right arm, Patient Position: Sitting, Cuff Size: Adult)  Pulse 66  Temp 97.4 °F (36.3 °C) (Oral)   Ht 67.5\" (171.5 cm)  Wt 172 lb (78 kg)  SpO2 98%  BMI 26.54 kg/m2    The following portions of the patient's history were reviewed and updated as appropriate: allergies, current medications, past family history, past medical history, past social history, past surgical history and problem list.    Review of Systems   Constitutional: Negative for activity change, appetite change and fever.   HENT: Negative for ear pain, sinus pressure and sore throat.    Eyes: Negative for pain and visual disturbance.   Respiratory: Negative for cough and chest tightness.    Cardiovascular: Negative for chest pain and palpitations.   Gastrointestinal: Negative for abdominal pain, constipation, diarrhea, nausea and vomiting.   Endocrine: Negative for polydipsia and polyuria.   Genitourinary: Negative for dysuria and frequency.   Musculoskeletal: Positive for back pain. Negative for myalgias.   Skin: Negative for color change and rash.             Allergic/Immunologic: Negative " "for food allergies and immunocompromised state.   Neurological: Negative for dizziness, syncope and headaches.   Hematological: Negative for adenopathy. Does not bruise/bleed easily.   Psychiatric/Behavioral: Negative for hallucinations and suicidal ideas. The patient is not nervous/anxious.        /76 (BP Location: Right arm, Patient Position: Sitting, Cuff Size: Adult)  Pulse 66  Temp 97.4 °F (36.3 °C) (Oral)   Ht 67.5\" (171.5 cm)  Wt 172 lb (78 kg)  SpO2 98%  BMI 26.54 kg/m2    Objective   Physical Exam   Constitutional: He is oriented to person, place, and time. He appears well-developed and well-nourished.   HENT:   Head: Normocephalic and atraumatic.   Nose: Nose normal.   Mouth/Throat: Oropharynx is clear and moist.   Eyes: Conjunctivae and EOM are normal. Pupils are equal, round, and reactive to light.   Neck: Normal range of motion. Neck supple. No tracheal deviation present. No thyromegaly present.   Cardiovascular: Normal rate, regular rhythm, normal heart sounds and intact distal pulses.    No murmur heard.  Pulmonary/Chest: Effort normal and breath sounds normal. No respiratory distress. He has no wheezes.   Abdominal: Soft. Bowel sounds are normal. There is no tenderness. There is no guarding.   Musculoskeletal: Normal range of motion. He exhibits tenderness (lumbar musculature diffusely tender). He exhibits no edema.   Lymphadenopathy:     He has no cervical adenopathy.   Neurological: He is alert and oriented to person, place, and time.   Skin: Skin is warm and dry. No rash noted.   Psychiatric: He has a normal mood and affect. His behavior is normal.   Nursing note and vitals reviewed.      Assessment/Plan   Diagnoses and all orders for this visit:    Mechanical back pain    Coronary artery disease involving native coronary artery of native heart without angina pectoris    Essential hypertension      Refill Opana ER 20 mg 1 tablet twice a day #60 no refills   written by " Raisa    [facility] #[id number] Reviewed and is consistent.  UDS 3/14/17 reviewed and is consistent.  Patient comfort assessment guide reviewed and updated today.    As part of the patient's treatment plan they are being prescribed a controlled substance/ substances with potential for abuse.  This patient has been made aware of the appropriate use of such medications, including potential risk of somnolence, limited ability to drive and/or work safely, and potential for overdose.  It has also been made clear these medications are for use by the patient only, without concomitant use of alcohol or other substances unless prescribed/advised by medical provider.    Patient has completed prescribing agreement detailing terms of continued prescribing of controlled substances including monitoring DANAE reports, urine drug screens, and pill counts.  The patient is aware DANAE reports are reviewed on a regular basis and scanned into the chart.    History and physical exam exhibit continued safe and appropriate use of controlled substances.

## 2017-04-18 RX ORDER — FUROSEMIDE 40 MG/1
40 TABLET ORAL DAILY
Qty: 30 TABLET | Refills: 5 | Status: SHIPPED | OUTPATIENT
Start: 2017-04-18 | End: 2017-05-30 | Stop reason: SDUPTHER

## 2017-04-18 RX ORDER — LOSARTAN POTASSIUM 50 MG/1
50 TABLET ORAL DAILY
Qty: 30 TABLET | Refills: 5 | Status: SHIPPED | OUTPATIENT
Start: 2017-04-18 | End: 2017-08-18 | Stop reason: SDUPTHER

## 2017-05-11 RX ORDER — WARFARIN SODIUM 4 MG/1
4 TABLET ORAL
Qty: 30 TABLET | Refills: 5 | Status: SHIPPED | OUTPATIENT
Start: 2017-05-11 | End: 2017-11-22 | Stop reason: SDUPTHER

## 2017-05-12 ENCOUNTER — OFFICE VISIT (OUTPATIENT)
Dept: FAMILY MEDICINE CLINIC | Facility: CLINIC | Age: 67
End: 2017-05-12

## 2017-05-12 DIAGNOSIS — F41.9 CHRONIC ANXIETY: ICD-10-CM

## 2017-05-12 DIAGNOSIS — J30.9 CHRONIC ALLERGIC RHINITIS: ICD-10-CM

## 2017-05-12 DIAGNOSIS — E78.2 MIXED HYPERLIPIDEMIA: ICD-10-CM

## 2017-05-12 DIAGNOSIS — K21.9 GASTROESOPHAGEAL REFLUX DISEASE WITHOUT ESOPHAGITIS: ICD-10-CM

## 2017-05-12 DIAGNOSIS — I25.10 CORONARY ARTERY DISEASE INVOLVING NATIVE CORONARY ARTERY OF NATIVE HEART WITHOUT ANGINA PECTORIS: ICD-10-CM

## 2017-05-12 DIAGNOSIS — K57.30 DIVERTICULOSIS OF LARGE INTESTINE WITHOUT HEMORRHAGE: ICD-10-CM

## 2017-05-12 DIAGNOSIS — F17.200 SMOKER: ICD-10-CM

## 2017-05-12 DIAGNOSIS — E11.42 TYPE 2 DIABETES MELLITUS WITH PERIPHERAL NEUROPATHY (HCC): ICD-10-CM

## 2017-05-12 DIAGNOSIS — D50.8 OTHER IRON DEFICIENCY ANEMIA: ICD-10-CM

## 2017-05-12 DIAGNOSIS — J44.1 CHRONIC OBSTRUCTIVE PULMONARY DISEASE WITH ACUTE EXACERBATION (HCC): ICD-10-CM

## 2017-05-12 DIAGNOSIS — N52.01 ERECTILE DYSFUNCTION DUE TO ARTERIAL INSUFFICIENCY: ICD-10-CM

## 2017-05-12 DIAGNOSIS — I50.22 CHRONIC SYSTOLIC CONGESTIVE HEART FAILURE (HCC): ICD-10-CM

## 2017-05-12 DIAGNOSIS — I10 ESSENTIAL HYPERTENSION: Primary | ICD-10-CM

## 2017-05-12 DIAGNOSIS — M54.9 MECHANICAL BACK PAIN: ICD-10-CM

## 2017-05-12 DIAGNOSIS — E55.9 VITAMIN D DEFICIENCY: ICD-10-CM

## 2017-05-12 PROCEDURE — 99214 OFFICE O/P EST MOD 30 MIN: CPT | Performed by: GENERAL PRACTICE

## 2017-05-12 RX ORDER — ATORVASTATIN CALCIUM 80 MG/1
80 TABLET, FILM COATED ORAL NIGHTLY
Qty: 30 TABLET | Refills: 5 | Status: SHIPPED | OUTPATIENT
Start: 2017-05-12 | End: 2017-08-18

## 2017-05-13 VITALS
TEMPERATURE: 98 F | HEIGHT: 68 IN | HEART RATE: 75 BPM | BODY MASS INDEX: 26.67 KG/M2 | WEIGHT: 176 LBS | OXYGEN SATURATION: 94 % | DIASTOLIC BLOOD PRESSURE: 65 MMHG | RESPIRATION RATE: 12 BRPM | SYSTOLIC BLOOD PRESSURE: 120 MMHG

## 2017-05-13 RX ORDER — INSULIN GLARGINE 100 [IU]/ML
45 INJECTION, SOLUTION SUBCUTANEOUS DAILY
Qty: 20 ML | Refills: 5
Start: 2017-05-13 | End: 2017-08-18 | Stop reason: SDUPTHER

## 2017-05-30 ENCOUNTER — TELEPHONE (OUTPATIENT)
Dept: FAMILY MEDICINE CLINIC | Facility: CLINIC | Age: 67
End: 2017-05-30

## 2017-05-30 RX ORDER — FUROSEMIDE 40 MG/1
80 TABLET ORAL DAILY
Qty: 60 TABLET | Refills: 5 | Status: SHIPPED | OUTPATIENT
Start: 2017-05-30 | End: 2017-08-18 | Stop reason: SDUPTHER

## 2017-06-05 DIAGNOSIS — M54.9 MECHANICAL BACK PAIN: ICD-10-CM

## 2017-06-20 ENCOUNTER — OFFICE VISIT (OUTPATIENT)
Dept: FAMILY MEDICINE CLINIC | Facility: CLINIC | Age: 67
End: 2017-06-20

## 2017-06-20 VITALS
WEIGHT: 177 LBS | HEART RATE: 67 BPM | BODY MASS INDEX: 26.83 KG/M2 | HEIGHT: 68 IN | TEMPERATURE: 97.6 F | SYSTOLIC BLOOD PRESSURE: 126 MMHG | DIASTOLIC BLOOD PRESSURE: 70 MMHG | OXYGEN SATURATION: 98 %

## 2017-06-20 DIAGNOSIS — I10 ESSENTIAL HYPERTENSION: ICD-10-CM

## 2017-06-20 DIAGNOSIS — H91.93 HEARING LOSS, BILATERAL: Primary | ICD-10-CM

## 2017-06-20 DIAGNOSIS — M54.9 MECHANICAL BACK PAIN: ICD-10-CM

## 2017-06-20 DIAGNOSIS — I25.10 CORONARY ARTERY DISEASE INVOLVING NATIVE CORONARY ARTERY OF NATIVE HEART WITHOUT ANGINA PECTORIS: ICD-10-CM

## 2017-06-20 DIAGNOSIS — Z79.899 LONG TERM USE OF DRUG: ICD-10-CM

## 2017-06-20 PROCEDURE — 99214 OFFICE O/P EST MOD 30 MIN: CPT | Performed by: PHYSICIAN ASSISTANT

## 2017-06-20 NOTE — PROGRESS NOTES
"Katie Zuñiga is a 66 y.o. male.     History of Present Illness     Chronic back pain -  He complains of chronic lumbar back pain.  Pain is not related to a specific injury.  Symptoms include back pain,stiffness, and decreased ROJM.  He denies urinary incontinence or fecal incontinence.  Pain is located int he low back.  Radiation to left leg. Pain is described as varying from mild to severe burning and throbbing. Onset x years.  Symptoms exacerbated by standing and walking.  Symptoms alleviated by rest and opioid analgesics.  Associated symptoms include sexual dysfunction.  Pain causes functional limitation including general activity, mood, walking ability, work, and activities of daily living.    9/4/09 MRI lumbar spine = disc bulging L4/5 and L5/S1 with central protrusion of disc and facet arthropathy involving L3-S1    CAD - stable    Hypertension - stable    Trouble hearing-  He complains of decreased hearing worse in the left ear than the right.  Onset was several years ago.  No known specific injury.    /70 (BP Location: Right arm, Patient Position: Sitting, Cuff Size: Adult)  Pulse 67  Temp 97.6 °F (36.4 °C) (Oral)   Ht 67.5\" (171.5 cm)  Wt 177 lb (80.3 kg)  SpO2 98%  BMI 27.31 kg/m2    The following portions of the patient's history were reviewed and updated as appropriate: allergies, current medications, past family history, past medical history, past social history, past surgical history and problem list.    Review of Systems   Constitutional: Negative for activity change, appetite change and fever.   HENT: Negative for ear pain, sinus pressure and sore throat.         Trouble hearing   Eyes: Negative for pain and visual disturbance.   Respiratory: Negative for cough and chest tightness.    Cardiovascular: Negative for chest pain and palpitations.   Gastrointestinal: Negative for abdominal pain, constipation, diarrhea, nausea and vomiting.   Endocrine: Negative for polydipsia and " "polyuria.   Genitourinary: Negative for dysuria and frequency.   Musculoskeletal: Positive for back pain. Negative for myalgias.   Skin: Negative for color change and rash.             Allergic/Immunologic: Negative for food allergies and immunocompromised state.   Neurological: Negative for dizziness, syncope and headaches.   Hematological: Negative for adenopathy. Does not bruise/bleed easily.   Psychiatric/Behavioral: Negative for hallucinations and suicidal ideas. The patient is not nervous/anxious.        /70 (BP Location: Right arm, Patient Position: Sitting, Cuff Size: Adult)  Pulse 67  Temp 97.6 °F (36.4 °C) (Oral)   Ht 67.5\" (171.5 cm)  Wt 177 lb (80.3 kg)  SpO2 98%  BMI 27.31 kg/m2    Objective   Physical Exam   Constitutional: He is oriented to person, place, and time. He appears well-developed and well-nourished.   HENT:   Head: Normocephalic and atraumatic.   Nose: Nose normal.   Mouth/Throat: Oropharynx is clear and moist.   Eyes: Conjunctivae and EOM are normal. Pupils are equal, round, and reactive to light.   Neck: Normal range of motion. Neck supple. No tracheal deviation present. No thyromegaly present.   Cardiovascular: Normal rate, regular rhythm, normal heart sounds and intact distal pulses.    No murmur heard.  Pulmonary/Chest: Effort normal and breath sounds normal. No respiratory distress. He has no wheezes.   Abdominal: Soft. Bowel sounds are normal. There is no tenderness. There is no guarding.   Musculoskeletal: Normal range of motion. He exhibits tenderness (lumbar musculature diffusely tender). He exhibits no edema.   Lymphadenopathy:     He has no cervical adenopathy.   Neurological: He is alert and oriented to person, place, and time.   Skin: Skin is warm and dry. No rash noted.   Psychiatric: He has a normal mood and affect. His behavior is normal.   Nursing note and vitals reviewed.      Assessment/Plan   Diagnoses and all orders for this visit:    Hearing loss, " bilateral  -     Ambulatory Referral to ENT (Otolaryngology)    Long term use of drug  -     Urine Drug Screen    Mechanical back pain    Coronary artery disease involving native coronary artery of native heart without angina pectoris    Essential hypertension      Refill Opana ER 20 mg 1 tablet twice a day #60 no refills   written by Dr. Raisa Kirk #[id number] Reviewed and is consistent.  UDS 3/14/17 reviewed and is consistent.  Patient comfort assessment guide reviewed and updated today.    As part of the patient's treatment plan they are being prescribed a controlled substance/ substances with potential for abuse.  This patient has been made aware of the appropriate use of such medications, including potential risk of somnolence, limited ability to drive and/or work safely, and potential for overdose.  It has also been made clear these medications are for use by the patient only, without concomitant use of alcohol or other substances unless prescribed/advised by medical provider.    Patient has completed prescribing agreement detailing terms of continued prescribing of controlled substances including monitoring DANAE reports, urine drug screens, and pill counts.  The patient is aware DANAE reports are reviewed on a regular basis and scanned into the chart.    History and physical exam exhibit continued safe and appropriate use of controlled substances.

## 2017-07-17 DIAGNOSIS — M54.9 MECHANICAL BACK PAIN: ICD-10-CM

## 2017-07-21 ENCOUNTER — TELEPHONE (OUTPATIENT)
Dept: FAMILY MEDICINE CLINIC | Facility: CLINIC | Age: 67
End: 2017-07-21

## 2017-07-21 ENCOUNTER — OFFICE VISIT (OUTPATIENT)
Dept: FAMILY MEDICINE CLINIC | Facility: CLINIC | Age: 67
End: 2017-07-21

## 2017-07-21 VITALS
DIASTOLIC BLOOD PRESSURE: 60 MMHG | WEIGHT: 172 LBS | BODY MASS INDEX: 26.07 KG/M2 | SYSTOLIC BLOOD PRESSURE: 110 MMHG | TEMPERATURE: 97.7 F | HEART RATE: 56 BPM | HEIGHT: 68 IN | OXYGEN SATURATION: 93 %

## 2017-07-21 DIAGNOSIS — Z00.00 MEDICARE ANNUAL WELLNESS VISIT, SUBSEQUENT: Primary | ICD-10-CM

## 2017-07-21 DIAGNOSIS — I25.10 CORONARY ARTERY DISEASE INVOLVING NATIVE CORONARY ARTERY OF NATIVE HEART WITHOUT ANGINA PECTORIS: ICD-10-CM

## 2017-07-21 DIAGNOSIS — I10 ESSENTIAL HYPERTENSION: ICD-10-CM

## 2017-07-21 DIAGNOSIS — H91.93 HEARING LOSS, BILATERAL: ICD-10-CM

## 2017-07-21 DIAGNOSIS — E11.42 TYPE 2 DIABETES MELLITUS WITH PERIPHERAL NEUROPATHY (HCC): ICD-10-CM

## 2017-07-21 DIAGNOSIS — Z11.59 NEED FOR HEPATITIS C SCREENING TEST: ICD-10-CM

## 2017-07-21 PROCEDURE — 99406 BEHAV CHNG SMOKING 3-10 MIN: CPT | Performed by: PHYSICIAN ASSISTANT

## 2017-07-21 PROCEDURE — G0439 PPPS, SUBSEQ VISIT: HCPCS | Performed by: PHYSICIAN ASSISTANT

## 2017-07-21 PROCEDURE — 96160 PT-FOCUSED HLTH RISK ASSMT: CPT | Performed by: PHYSICIAN ASSISTANT

## 2017-07-21 NOTE — TELEPHONE ENCOUNTER
---called [facility] for this us        -- Message from SHIRA Maravilla sent at 7/21/2017  9:39 AM EDT -----   Please request reports for his AAA ultrasound done at [facility] spring 2017

## 2017-07-21 NOTE — PROGRESS NOTES
QUICK REFERENCE INFORMATION:  The ABCs of the Annual Wellness Visit    Subsequent Medicare Wellness Visit    HEALTH RISK ASSESSMENT    1950    Recent Hospitalizations:  Recently treated at the following:  Other: [facility].        Current Medical Providers:  Patient Care Team:  Edy Kline MD as PCP - General  Edy Kline MD as PCP - Family Medicine        Smoking Status:  History   Smoking Status   • Current Every Day Smoker   • Packs/day: 0.50   • Years: 50.00   • Types: Cigarettes   Smokeless Tobacco   • Never Used       Alcohol Consumption:  History   Alcohol Use No       Depression Screen:   PHQ-9 Depression Screening 7/21/2017   Little interest or pleasure in doing things 1   Feeling down, depressed, or hopeless 0   Trouble falling or staying asleep, or sleeping too much 3   Feeling tired or having little energy 3   Poor appetite or overeating 3   Feeling bad about yourself - or that you are a failure or have let yourself or your family down 0   Trouble concentrating on things, such as reading the newspaper or watching television 0   Moving or speaking so slowly that other people could have noticed. Or the opposite - being so fidgety or restless that you have been moving around a lot more than usual 0   Thoughts that you would be better off dead, or of hurting yourself in some way 1   PHQ-9 Total Score 11   If you checked off any problems, how difficult have these problems made it for you to do your work, take care of things at home, or get along with other people? Not difficult at all     He declines medication for depression at this time.    Health Habits and Functional and Cognitive Screening:  Functional & Cognitive Status 7/21/2017   Do you have difficulty preparing food and eating? Yes   Do you have difficulty bathing yourself? No   Do you have difficulty getting dressed? No   Do you have difficulty using the toilet? No   Do you have difficulty moving around from place  to place? No   In the past year have you fallen or experienced a near fall? Yes   Do you need help using the phone?  Yes   Are you deaf or do you have serious difficulty hearing?  No   Do you need help with transportation? No   Do you need help shopping? No   Do you need help preparing meals?  Yes   Do you need help with housework?  Yes   Do you need help with laundry? Yes   Do you need help taking your medications? Yes   Do you need help managing money? Yes   Do you have difficulty concentrating, remembering or making decisions? Yes     His wife preforms these functions for him.    Health Habits  Current Diet: Unhealthy Diet  Dental Exam: Up to date  Eye Exam: Not up to date  Exercise (times per week): 2 times per week  Current Exercise Activities Include: Walking      Does the patient have evidence of cognitive impairment? Yes    Aspirin use counseling: Taking ASA appropriately as indicated      Recent Lab Results:  CMP:  Lab Results   Component Value Date    BUN 31 (H) 04/11/2017    CREATININE 1.41 (H) 04/11/2017    EGFRIFNONA 50 (L) 04/11/2017    BCR 22.0 04/11/2017     04/11/2017    K 4.4 04/11/2017    CO2 32.3 (H) 04/11/2017    CALCIUM 10.4 (H) 04/11/2017    ALBUMIN 4.60 04/11/2017    LABIL2 1.4 (L) 04/11/2017    BILITOT 0.4 04/11/2017    ALKPHOS 64 04/11/2017    AST 33 04/11/2017    ALT 38 04/11/2017     Lipid Panel:  Lab Results   Component Value Date    CHOL 305 (H) 04/11/2017    TRIG 427 (H) 04/11/2017    HDL 40 (L) 04/11/2017    VLDL  04/11/2017      Comment:      Unable to calculate    LDLCALC  04/11/2017      Comment:      Unable to calculate    LDLHDL  04/11/2017      Comment:      Unable to calculate     HbA1c:  Lab Results   Component Value Date    HGBA1C 8.90 (H) 04/11/2017       Visual Acuity:   Visual Acuity Screening    Right eye Left eye Both eyes   Without correction: 20/50 20/50 20/25   With correction:        Hearing acuity:  Whisper test  Less than 5 feet left ear  Less than 5 feet  right ear  He was advised that he did fail his hearing screening today.  He will be referred to ENT specialist for further testing.    Age-appropriate Screening Schedule:  Refer to the list below for future screening recommendations based on patient's age, sex and/or medical conditions. Orders for these recommended tests are listed in the plan section. The patient has been provided with a written plan.    Health Maintenance   Topic Date Due   • TDAP/TD VACCINES (1 - Tdap) 07/19/2018 (Originally 12/13/1969)   • COLONOSCOPY  07/08/2021 (Originally 8/11/2016)   • DXA SCAN  07/20/2022 (Originally 6/22/2016)   • INFLUENZA VACCINE  08/01/2017   • HEMOGLOBIN A1C  10/11/2017   • LIPID PANEL  04/11/2018   • DIABETIC FOOT EXAM  07/21/2018   • DIABETIC EYE EXAM  07/21/2018   • URINE MICROALBUMIN  07/21/2018   • PNEUMOCOCCAL VACCINES (65+ LOW/MEDIUM RISK) (2 of 2 - PPSV23) 01/13/2019   • ZOSTER VACCINE  Completed        Subjective   History of Present Illness    Adria Zuñiga is a 66 y.o. male who presents for an Subsequent Wellness Visit.    He complains of hearing loss bilaterally with left ear hearing worse than right ear.    Diabetes mellitus type 2-stable    Hypertension-stable    Tobacco use disorder-he smokes one half pack per day for 50 years.  He states he is not interested in smoking cessation at this time.    Coronary artery disease-stable    The following portions of the patient's history were reviewed and updated as appropriate: allergies, current medications, past family history, past medical history, past social history, past surgical history and problem list.    Outpatient Medications Prior to Visit   Medication Sig Dispense Refill   • aspirin 81 MG chewable tablet Chew 1 tablet daily. 30 tablet 5   • atorvastatin (LIPITOR) 80 MG tablet Take 1 tablet by mouth Every Night. 30 tablet 5   • carvedilol (COREG) 12.5 MG tablet Take 1 tablet by mouth 2 (Two) Times a Day With Meals. 60 tablet 5   • DULoxetine (CYMBALTA)  "60 MG capsule Take 1 capsule by mouth Daily. 30 capsule 5   • esomeprazole (nexIUM) 40 MG capsule Take 1 capsule by mouth 2 (Two) Times a Day. 60 capsule 5   • ferrous sulfate 325 (65 FE) MG tablet Take 1 tablet by mouth 3 (Three) Times a Day With Meals. 90 tablet 5   • furosemide (LASIX) 40 MG tablet Take 2 tablets by mouth Daily. 60 tablet 5   • insulin lispro (humaLOG) 100 UNIT/ML injection Inject 10 Units under the skin 3 (Three) Times a Day Before Meals. 10 mL 5   • Insulin Pen Needle 32G X 4 MM misc 1 each 4 (Four) Times a Day. 120 each 5   • Insulin Syringe 28G X 1/2\" 0.5 ML misc 2 (two) times a day.     • Lancet Device misc daily.     • LANTUS 100 UNIT/ML injection Inject 45 Units under the skin Daily. 20 mL 5   • losartan (COZAAR) 50 MG tablet Take 1 tablet by mouth Daily. 30 tablet 5   • metFORMIN (GLUCOPHAGE) 1000 MG tablet Take 1 tablet by mouth 2 (Two) Times a Day With Meals. 60 tablet 5   • oxyMORphone ER (OPANA ER) 20 MG tablet extended-release 12 hour 12 hr tablet Take 1 tablet by mouth 2 (Two) Times a Day. 60 tablet 0   • spironolactone (ALDACTONE) 25 MG tablet Take 1 tablet by mouth Daily. 1/2 TABLET EVERY MORNING 15 tablet 5   • warfarin (COUMADIN) 1 MG tablet Take 1 tablet by mouth Daily. 30 tablet 5   • warfarin (COUMADIN) 4 MG tablet Take 1 tablet by mouth Daily. 30 tablet 5     No facility-administered medications prior to visit.        Patient Active Problem List   Diagnosis   • Essential hypertension   • Mixed hyperlipidemia   • CAD (coronary artery disease)   • Chronic systolic congestive heart failure   • Chronic obstructive pulmonary disease with acute exacerbation   • Type 2 diabetes mellitus with peripheral neuropathy   • Vitamin D deficiency   • Non-small cell carcinoma of lung   • Mechanical back pain   • Gastroesophageal reflux disease without esophagitis   • Chronic allergic rhinitis   • Chronic anxiety   • Diverticulosis of large intestine without hemorrhage   • Vasculogenic " erectile dysfunction   • Smoker   • Healthcare maintenance   • Iron deficiency anemia   • Long term current use of anticoagulant       Advance Care Planning:  has NO advance directive - information provided to the patient today    Identification of Risk Factors:  Risk factors include: cardiovascular risk, inactivity, tobacco use, chronic pain, depression, cognitive impairment and hearing limitations.    Review of Systems   Constitutional: Negative for activity change, appetite change and fever.   HENT: Negative for ear pain, sinus pressure and sore throat.    Eyes: Negative for pain and visual disturbance.   Respiratory: Negative for cough and chest tightness.    Cardiovascular: Negative for chest pain and palpitations.   Gastrointestinal: Negative for abdominal pain, constipation, diarrhea, nausea and vomiting.   Endocrine: Negative for polydipsia and polyuria.   Genitourinary: Negative for dysuria and frequency.   Musculoskeletal: Positive for arthralgias and back pain (chronic). Negative for myalgias.   Skin: Negative for color change and rash.   Allergic/Immunologic: Negative for food allergies and immunocompromised state.   Neurological: Negative for dizziness, syncope and headaches.   Hematological: Negative for adenopathy. Does not bruise/bleed easily.   Psychiatric/Behavioral: Negative for hallucinations and suicidal ideas. The patient is not nervous/anxious.        Compared to one year ago, the patient feels his physical health is worse.  Compared to one year ago, the patient feels his mental health is worse.    Objective     Physical Exam   Constitutional: He is oriented to person, place, and time. He appears well-developed and well-nourished.   HENT:   Head: Normocephalic and atraumatic.   Nose: Nose normal.   Mouth/Throat: Oropharynx is clear and moist.   Eyes: Conjunctivae and EOM are normal. Pupils are equal, round, and reactive to light.   Neck: Normal range of motion. Neck supple. No tracheal  "deviation present. No thyromegaly present.   Cardiovascular: Normal rate, regular rhythm, normal heart sounds and intact distal pulses.    No murmur heard.  Pulmonary/Chest: Effort normal and breath sounds normal. No respiratory distress. He has no wheezes.   Abdominal: Soft. Bowel sounds are normal. There is no tenderness. There is no guarding.   Musculoskeletal: Normal range of motion. He exhibits tenderness (lumbar musculature diffusely tender). He exhibits no edema.   Lymphadenopathy:     He has no cervical adenopathy.   Neurological: He is alert and oriented to person, place, and time.   Skin: Skin is warm and dry. No rash noted.   Psychiatric: He has a normal mood and affect. His behavior is normal.   Nursing note and vitals reviewed.      Vitals:    07/21/17 0901   BP: 110/60   BP Location: Left arm   Patient Position: Sitting   Cuff Size: Adult   Pulse: 56   Temp: 97.7 °F (36.5 °C)   TempSrc: Oral   SpO2: 93%   Weight: 172 lb (78 kg)   Height: 67.5\" (171.5 cm)       Body mass index is 26.54 kg/(m^2).  Discussed the patient's BMI with him. The BMI is in the acceptable range.    Assessment/Plan   Patient Self-Management and Personalized Health Advice  The patient has been provided with information about: diet, prevention of cardiac or vascular disease, tobacco cessation, fall prevention and designing advance directives and preventive services including:   · Advance directive, Fall Risk plan of care done, Smoking cessation counseling completed.    Visit Diagnoses:    ICD-10-CM ICD-9-CM   1. Medicare annual wellness visit, subsequent Z00.00 V70.0   2. Hearing loss, bilateral H91.93 389.9   3. Type 2 diabetes mellitus with peripheral neuropathy E11.42 250.60     357.2   4. Need for hepatitis C screening test Z11.59 V73.89   5. Essential hypertension I10 401.9   6. Coronary artery disease involving native coronary artery of native heart without angina pectoris I25.10 414.01     He was counseled on smoking " "cessation for 3 minutes today.  He states that he is not interested in smoking cessation at this time.  He was advised of the benefits to his health as well as the multiple options for cessation.    Orders Placed This Encounter   Procedures   • MicroAlbumin, Urine, Random     Standing Status:   Future     Standing Expiration Date:   7/21/2018   • Hepatitis C Antibody     Standing Status:   Future     Standing Expiration Date:   7/21/2018   • Ambulatory Referral to ENT (Otolaryngology)     Referral Priority:   Routine     Referral Type:   Consultation     Referral Reason:   Specialty Services Required     Requested Specialty:   Otolaryngology     Number of Visits Requested:   1   • Ambulatory Referral to Ophthalmology     Referral Priority:   Routine     Referral Type:   Consultation     Referral Reason:   Specialty Services Required     Requested Specialty:   Ophthalmology     Number of Visits Requested:   1       Outpatient Encounter Prescriptions as of 7/21/2017   Medication Sig Dispense Refill   • aspirin 81 MG chewable tablet Chew 1 tablet daily. 30 tablet 5   • atorvastatin (LIPITOR) 80 MG tablet Take 1 tablet by mouth Every Night. 30 tablet 5   • carvedilol (COREG) 12.5 MG tablet Take 1 tablet by mouth 2 (Two) Times a Day With Meals. 60 tablet 5   • DULoxetine (CYMBALTA) 60 MG capsule Take 1 capsule by mouth Daily. 30 capsule 5   • esomeprazole (nexIUM) 40 MG capsule Take 1 capsule by mouth 2 (Two) Times a Day. 60 capsule 5   • ferrous sulfate 325 (65 FE) MG tablet Take 1 tablet by mouth 3 (Three) Times a Day With Meals. 90 tablet 5   • furosemide (LASIX) 40 MG tablet Take 2 tablets by mouth Daily. 60 tablet 5   • insulin lispro (humaLOG) 100 UNIT/ML injection Inject 10 Units under the skin 3 (Three) Times a Day Before Meals. 10 mL 5   • Insulin Pen Needle 32G X 4 MM misc 1 each 4 (Four) Times a Day. 120 each 5   • Insulin Syringe 28G X 1/2\" 0.5 ML misc 2 (two) times a day.     • Lancet Device misc daily.   "   • LANTUS 100 UNIT/ML injection Inject 45 Units under the skin Daily. 20 mL 5   • losartan (COZAAR) 50 MG tablet Take 1 tablet by mouth Daily. 30 tablet 5   • metFORMIN (GLUCOPHAGE) 1000 MG tablet Take 1 tablet by mouth 2 (Two) Times a Day With Meals. 60 tablet 5   • oxyMORphone ER (OPANA ER) 20 MG tablet extended-release 12 hour 12 hr tablet Take 1 tablet by mouth 2 (Two) Times a Day. 60 tablet 0   • spironolactone (ALDACTONE) 25 MG tablet Take 1 tablet by mouth Daily. 1/2 TABLET EVERY MORNING 15 tablet 5   • warfarin (COUMADIN) 1 MG tablet Take 1 tablet by mouth Daily. 30 tablet 5   • warfarin (COUMADIN) 4 MG tablet Take 1 tablet by mouth Daily. 30 tablet 5     No facility-administered encounter medications on file as of 7/21/2017.        Reviewed use of high risk medication in the elderly: yes  Reviewed for potential of harmful drug interactions in the elderly: yes    Follow Up:  No Follow-up on file.     An After Visit Summary and PPPS with all of these plans were given to the patient.

## 2017-07-25 ENCOUNTER — OFFICE VISIT (OUTPATIENT)
Dept: FAMILY MEDICINE CLINIC | Facility: CLINIC | Age: 67
End: 2017-07-25

## 2017-07-25 VITALS
DIASTOLIC BLOOD PRESSURE: 62 MMHG | BODY MASS INDEX: 26.67 KG/M2 | HEART RATE: 70 BPM | SYSTOLIC BLOOD PRESSURE: 120 MMHG | WEIGHT: 176 LBS | HEIGHT: 68 IN | OXYGEN SATURATION: 96 % | TEMPERATURE: 97.8 F

## 2017-07-25 DIAGNOSIS — I10 ESSENTIAL HYPERTENSION: ICD-10-CM

## 2017-07-25 DIAGNOSIS — M54.9 MECHANICAL BACK PAIN: Primary | ICD-10-CM

## 2017-07-25 DIAGNOSIS — I25.10 CORONARY ARTERY DISEASE INVOLVING NATIVE CORONARY ARTERY OF NATIVE HEART WITHOUT ANGINA PECTORIS: ICD-10-CM

## 2017-07-25 PROCEDURE — 99214 OFFICE O/P EST MOD 30 MIN: CPT | Performed by: PHYSICIAN ASSISTANT

## 2017-07-25 NOTE — PROGRESS NOTES
"Katie Zuñiga is a 66 y.o. male.     History of Present Illness     Chronic back pain -  He complains of chronic lumbar back pain.  Pain is not related to a specific injury.  Symptoms include back pain,stiffness, and decreased ROJM.  He denies urinary incontinence or fecal incontinence.  Pain is located int he low back.  Radiation to left leg. Pain is described as varying from mild to severe burning and throbbing. Onset x years.  Symptoms exacerbated by standing and walking.  Symptoms alleviated by rest and opioid analgesics.  Associated symptoms include sexual dysfunction.  Pain causes functional limitation including general activity, mood, walking ability, work, and activities of daily living.    9/4/09 MRI lumbar spine = disc bulging L4/5 and L5/S1 with central protrusion of disc and facet arthropathy involving L3-S1    CAD - stable    Hypertension - stable    /62 (BP Location: Left arm, Patient Position: Sitting, Cuff Size: Adult)  Pulse 70  Temp 97.8 °F (36.6 °C) (Oral)   Ht 67.5\" (171.5 cm)  Wt 176 lb (79.8 kg)  SpO2 96%  BMI 27.16 kg/m2    The following portions of the patient's history were reviewed and updated as appropriate: allergies, current medications, past family history, past medical history, past social history, past surgical history and problem list.    Review of Systems   Constitutional: Negative for activity change, appetite change and fever.   HENT: Negative for ear pain, sinus pressure and sore throat.    Eyes: Negative for pain and visual disturbance.   Respiratory: Negative for cough and chest tightness.    Cardiovascular: Negative for chest pain and palpitations.   Gastrointestinal: Negative for abdominal pain, constipation, diarrhea, nausea and vomiting.   Endocrine: Negative for polydipsia and polyuria.   Genitourinary: Negative for dysuria and frequency.   Musculoskeletal: Positive for back pain. Negative for myalgias.   Skin: Negative for color change and rash. " "            Allergic/Immunologic: Negative for food allergies and immunocompromised state.   Neurological: Negative for dizziness, syncope and headaches.   Hematological: Negative for adenopathy. Does not bruise/bleed easily.   Psychiatric/Behavioral: Negative for hallucinations and suicidal ideas. The patient is not nervous/anxious.        /62 (BP Location: Left arm, Patient Position: Sitting, Cuff Size: Adult)  Pulse 70  Temp 97.8 °F (36.6 °C) (Oral)   Ht 67.5\" (171.5 cm)  Wt 176 lb (79.8 kg)  SpO2 96%  BMI 27.16 kg/m2    Objective   Physical Exam   Constitutional: He is oriented to person, place, and time. He appears well-developed and well-nourished.   HENT:   Head: Normocephalic and atraumatic.   Nose: Nose normal.   Mouth/Throat: Oropharynx is clear and moist.   Eyes: Conjunctivae and EOM are normal. Pupils are equal, round, and reactive to light.   Neck: Normal range of motion. Neck supple. No tracheal deviation present. No thyromegaly present.   Cardiovascular: Normal rate, regular rhythm, normal heart sounds and intact distal pulses.    No murmur heard.  Pulmonary/Chest: Effort normal and breath sounds normal. No respiratory distress. He has no wheezes.   Abdominal: Soft. Bowel sounds are normal. There is no tenderness. There is no guarding.   Musculoskeletal: Normal range of motion. He exhibits tenderness (lumbar musculature diffusely tender). He exhibits no edema.   Lymphadenopathy:     He has no cervical adenopathy.   Neurological: He is alert and oriented to person, place, and time.   Skin: Skin is warm and dry. No rash noted.   Psychiatric: He has a normal mood and affect. His behavior is normal.   Nursing note and vitals reviewed.      Assessment/Plan   Diagnoses and all orders for this visit:    Mechanical back pain    Coronary artery disease involving native coronary artery of native heart without angina pectoris    Essential hypertension      Refill Opana ER 20 mg 1 tablet twice a day " #60 no refills   written by Dr. Raisa Kirk #[id number] Reviewed and is consistent.  UDS 6/20/17 reviewed and is consistent.  Patient comfort assessment guide reviewed and updated today.    As part of the patient's treatment plan they are being prescribed a controlled substance/ substances with potential for abuse.  This patient has been made aware of the appropriate use of such medications, including potential risk of somnolence, limited ability to drive and/or work safely, and potential for overdose.  It has also been made clear these medications are for use by the patient only, without concomitant use of alcohol or other substances unless prescribed/advised by medical provider.    Patient has completed prescribing agreement detailing terms of continued prescribing of controlled substances including monitoring DANAE reports, urine drug screens, and pill counts.  The patient is aware DANAE reports are reviewed on a regular basis and scanned into the chart.    History and physical exam exhibit continued safe and appropriate use of controlled substances.

## 2017-07-26 ENCOUNTER — RESULTS ENCOUNTER (OUTPATIENT)
Dept: FAMILY MEDICINE CLINIC | Facility: CLINIC | Age: 67
End: 2017-07-26

## 2017-07-26 DIAGNOSIS — Z11.59 NEED FOR HEPATITIS C SCREENING TEST: ICD-10-CM

## 2017-08-10 ENCOUNTER — LAB (OUTPATIENT)
Dept: FAMILY MEDICINE CLINIC | Facility: CLINIC | Age: 67
End: 2017-08-10

## 2017-08-10 DIAGNOSIS — M54.9 MECHANICAL BACK PAIN: ICD-10-CM

## 2017-08-10 DIAGNOSIS — F41.9 CHRONIC ANXIETY: ICD-10-CM

## 2017-08-10 DIAGNOSIS — E78.2 MIXED HYPERLIPIDEMIA: ICD-10-CM

## 2017-08-10 DIAGNOSIS — J30.9 CHRONIC ALLERGIC RHINITIS: ICD-10-CM

## 2017-08-10 DIAGNOSIS — I25.10 CORONARY ARTERY DISEASE INVOLVING NATIVE CORONARY ARTERY OF NATIVE HEART WITHOUT ANGINA PECTORIS: ICD-10-CM

## 2017-08-10 DIAGNOSIS — D50.8 OTHER IRON DEFICIENCY ANEMIA: ICD-10-CM

## 2017-08-10 DIAGNOSIS — Z79.01 LONG TERM CURRENT USE OF ANTICOAGULANT: ICD-10-CM

## 2017-08-10 DIAGNOSIS — I10 ESSENTIAL HYPERTENSION: ICD-10-CM

## 2017-08-10 DIAGNOSIS — J44.1 CHRONIC OBSTRUCTIVE PULMONARY DISEASE WITH ACUTE EXACERBATION (HCC): ICD-10-CM

## 2017-08-10 DIAGNOSIS — E11.42 TYPE 2 DIABETES MELLITUS WITH PERIPHERAL NEUROPATHY (HCC): Primary | ICD-10-CM

## 2017-08-10 DIAGNOSIS — I50.22 CHRONIC SYSTOLIC CONGESTIVE HEART FAILURE (HCC): ICD-10-CM

## 2017-08-10 DIAGNOSIS — E55.9 VITAMIN D DEFICIENCY: ICD-10-CM

## 2017-08-10 PROCEDURE — 36415 COLL VENOUS BLD VENIPUNCTURE: CPT | Performed by: GENERAL PRACTICE

## 2017-08-11 LAB
ALBUMIN SERPL-MCNC: 4.7 G/DL (ref 3.4–4.8)
ALBUMIN/GLOB SERPL: 1.7 G/DL (ref 1.5–2.5)
ALP SERPL-CCNC: 63 U/L (ref 40–129)
ALT SERPL-CCNC: 42 U/L (ref 10–44)
AST SERPL-CCNC: 26 U/L (ref 10–34)
BILIRUB SERPL-MCNC: 0.3 MG/DL (ref 0.2–1.8)
BUN SERPL-MCNC: 30 MG/DL (ref 7–21)
BUN/CREAT SERPL: 21.4 (ref 7–25)
CALCIUM SERPL-MCNC: 10.5 MG/DL (ref 7.7–10)
CHLORIDE SERPL-SCNC: 100 MMOL/L (ref 99–112)
CHOLEST SERPL-MCNC: 387 MG/DL (ref 0–200)
CO2 SERPL-SCNC: 31.5 MMOL/L (ref 24.3–31.9)
CREAT SERPL-MCNC: 1.4 MG/DL (ref 0.43–1.29)
FERRITIN SERPL-MCNC: 21 NG/ML (ref 21.9–321.7)
GLOBULIN SER CALC-MCNC: 2.7 GM/DL
GLUCOSE SERPL-MCNC: 232 MG/DL (ref 70–110)
HBA1C MFR BLD: 9.7 % (ref 4.5–5.7)
HCV AB S/CO SERPL IA: <0.1 S/CO RATIO (ref 0–0.9)
HDLC SERPL-MCNC: 31 MG/DL (ref 60–100)
IRON SERPL-MCNC: 244 MCG/DL (ref 53–167)
LDLC SERPL CALC-MCNC: ABNORMAL MG/DL
POTASSIUM SERPL-SCNC: 4 MMOL/L (ref 3.5–5.3)
PROT SERPL-MCNC: 7.4 G/DL (ref 6–8)
SODIUM SERPL-SCNC: 137 MMOL/L (ref 135–153)
TRIGL SERPL-MCNC: 1312 MG/DL (ref 0–150)
VLDLC SERPL CALC-MCNC: ABNORMAL MG/DL

## 2017-08-17 DIAGNOSIS — I25.10 CORONARY ARTERY DISEASE INVOLVING NATIVE CORONARY ARTERY OF NATIVE HEART WITHOUT ANGINA PECTORIS: ICD-10-CM

## 2017-08-17 DIAGNOSIS — I50.22 CHRONIC SYSTOLIC CONGESTIVE HEART FAILURE (HCC): ICD-10-CM

## 2017-08-17 RX ORDER — CARVEDILOL 12.5 MG/1
12.5 TABLET ORAL 2 TIMES DAILY WITH MEALS
Qty: 60 TABLET | Refills: 5 | Status: SHIPPED | OUTPATIENT
Start: 2017-08-17 | End: 2017-08-18 | Stop reason: SDUPTHER

## 2017-08-18 ENCOUNTER — OFFICE VISIT (OUTPATIENT)
Dept: FAMILY MEDICINE CLINIC | Facility: CLINIC | Age: 67
End: 2017-08-18

## 2017-08-18 DIAGNOSIS — J44.1 CHRONIC OBSTRUCTIVE PULMONARY DISEASE WITH ACUTE EXACERBATION (HCC): ICD-10-CM

## 2017-08-18 DIAGNOSIS — F41.9 CHRONIC ANXIETY: ICD-10-CM

## 2017-08-18 DIAGNOSIS — I25.10 CORONARY ARTERY DISEASE INVOLVING NATIVE CORONARY ARTERY OF NATIVE HEART WITHOUT ANGINA PECTORIS: ICD-10-CM

## 2017-08-18 DIAGNOSIS — K57.30 DIVERTICULOSIS OF LARGE INTESTINE WITHOUT HEMORRHAGE: ICD-10-CM

## 2017-08-18 DIAGNOSIS — J30.9 CHRONIC ALLERGIC RHINITIS: ICD-10-CM

## 2017-08-18 DIAGNOSIS — M54.9 MECHANICAL BACK PAIN: ICD-10-CM

## 2017-08-18 DIAGNOSIS — K21.9 GASTROESOPHAGEAL REFLUX DISEASE WITHOUT ESOPHAGITIS: ICD-10-CM

## 2017-08-18 DIAGNOSIS — E78.2 MIXED HYPERLIPIDEMIA: ICD-10-CM

## 2017-08-18 DIAGNOSIS — E11.42 TYPE 2 DIABETES MELLITUS WITH PERIPHERAL NEUROPATHY (HCC): ICD-10-CM

## 2017-08-18 DIAGNOSIS — I10 ESSENTIAL HYPERTENSION: Primary | ICD-10-CM

## 2017-08-18 DIAGNOSIS — N52.01 ERECTILE DYSFUNCTION DUE TO ARTERIAL INSUFFICIENCY: ICD-10-CM

## 2017-08-18 DIAGNOSIS — I50.22 CHRONIC SYSTOLIC CONGESTIVE HEART FAILURE (HCC): ICD-10-CM

## 2017-08-18 DIAGNOSIS — F17.200 SMOKER: ICD-10-CM

## 2017-08-18 DIAGNOSIS — E55.9 VITAMIN D DEFICIENCY: ICD-10-CM

## 2017-08-18 DIAGNOSIS — D50.8 OTHER IRON DEFICIENCY ANEMIA: ICD-10-CM

## 2017-08-18 DIAGNOSIS — Z00.00 HEALTHCARE MAINTENANCE: ICD-10-CM

## 2017-08-18 DIAGNOSIS — M77.01 MEDIAL EPICONDYLITIS OF RIGHT ELBOW: ICD-10-CM

## 2017-08-18 PROCEDURE — 99214 OFFICE O/P EST MOD 30 MIN: CPT | Performed by: GENERAL PRACTICE

## 2017-08-18 RX ORDER — SPIRONOLACTONE 25 MG/1
12.5 TABLET ORAL DAILY
Qty: 15 TABLET | Refills: 5 | Status: SHIPPED | OUTPATIENT
Start: 2017-08-18 | End: 2017-09-19

## 2017-08-18 RX ORDER — DULOXETIN HYDROCHLORIDE 60 MG/1
60 CAPSULE, DELAYED RELEASE ORAL DAILY
Qty: 30 CAPSULE | Refills: 5 | Status: SHIPPED | OUTPATIENT
Start: 2017-08-18 | End: 2018-03-20 | Stop reason: SDUPTHER

## 2017-08-18 RX ORDER — LOSARTAN POTASSIUM 50 MG/1
50 TABLET ORAL DAILY
Qty: 30 TABLET | Refills: 5 | Status: SHIPPED | OUTPATIENT
Start: 2017-08-18 | End: 2017-09-25

## 2017-08-18 RX ORDER — FUROSEMIDE 40 MG/1
80 TABLET ORAL DAILY
Qty: 60 TABLET | Refills: 5 | Status: SHIPPED | OUTPATIENT
Start: 2017-08-18 | End: 2017-09-25

## 2017-08-18 RX ORDER — ESOMEPRAZOLE MAGNESIUM 40 MG/1
40 CAPSULE, DELAYED RELEASE ORAL 2 TIMES DAILY
Qty: 60 CAPSULE | Refills: 5 | Status: SHIPPED | OUTPATIENT
Start: 2017-08-18 | End: 2018-04-23 | Stop reason: SDUPTHER

## 2017-08-18 RX ORDER — INSULIN GLARGINE 100 [IU]/ML
50 INJECTION, SOLUTION SUBCUTANEOUS DAILY
Qty: 20 ML | Refills: 5 | Status: SHIPPED | OUTPATIENT
Start: 2017-08-18 | End: 2017-08-21

## 2017-08-18 RX ORDER — FERROUS SULFATE 325(65) MG
325 TABLET ORAL
Qty: 90 TABLET | Refills: 5 | Status: SHIPPED | OUTPATIENT
Start: 2017-08-18 | End: 2018-10-05 | Stop reason: SDUPTHER

## 2017-08-18 RX ORDER — ROSUVASTATIN CALCIUM 40 MG/1
40 TABLET, COATED ORAL NIGHTLY
Qty: 30 TABLET | Refills: 5 | Status: SHIPPED | OUTPATIENT
Start: 2017-08-18 | End: 2017-09-25

## 2017-08-18 RX ORDER — CARVEDILOL 12.5 MG/1
12.5 TABLET ORAL 2 TIMES DAILY WITH MEALS
Qty: 60 TABLET | Refills: 5 | Status: SHIPPED | OUTPATIENT
Start: 2017-08-18 | End: 2018-09-14 | Stop reason: SDUPTHER

## 2017-08-18 NOTE — PROGRESS NOTES
Katie Zuñiga is a 66 y.o. male.     History of Present Illness     Congestive Heart Failure  Patient has a history of congestive heart failure with an estimated EF of 10-20% complicated by a previous apical mural thrombus.. Patient's current complaints are dyspnea with exertion and fatigue. He denies dyspnea at rest, orthopnea, paroxysmal nocturnal dyspnea, chest pain and palpitations. Current medications include carvedilol, losartan, spironolactone, furosemide and coumadin.  He states he is compliant most of the time with his medications. He states he is noncompliant most of the time with his diet. He did not follow up with his cardiologist in [place]. Most recent serum iron of 244 with a ferritin of 27.      Coronary artery disease  He has a history of CABG and CHF. Previous diagnostic testing includes: cardiac catheterization Recent history: taking medications as instructed, no medication side effects noted, no TIA's, no chest pain on exertion, notes stable dyspnea on exertion, no change and no swelling of ankles. Patient's symptoms have been unchanged. Medication side effects include: none.    Diabetes  Current symptoms include none. Patient denies paresthesia of the feet, visual disturbances, polydipsia, polyuria, hypoglycemia and foot ulcerations. Evaluation to date has been: hemoglobin A1C. Home sugars: BGs are running  consistent with Hgb A1C. Current treatments: basal insulin - 50 units daily. He essentially refuses to mealtime insulin. Last dilated eye exam more then one year ago. Most recent hemoglobin A1c   Lab Results   Component Value Date    HGBA1C 9.70 (H) 08/10/2017    HGBA1C 8.90 (H) 04/11/2017    HGBA1C 9.50 (H) 10/31/2016       Dyslipidemia  Compliance with treatment has been poor. The patient exercises rarely. He is currently being prescribed the following medication for his dyslipidemia - atorvastatin. Patient feels that this exacerbates his low back pain somewhat and admits  that he does not take it consistently. Most recent lipids include  Lab Results   Component Value Date    TRIG 1312 (H) 08/10/2017    TRIG 427 (H) 04/11/2017    TRIG 338 (H) 10/31/2016    HDL 31 (L) 08/10/2017    HDL 40 (L) 04/11/2017    HDL 32 (L) 10/31/2016    LDLCALC  04/11/2017      Comment:      Unable to calculate    LDLCALC 144 (H) 10/31/2016    LDL CANCELED 08/10/2017     Right Elbow Pain  He gives a several month history of right elbow pain. There's no history of any strain or trauma nor any change in his activities. The pain is described as a sharp ache with resting it on an armrest or with movement. The pain does not radiate elsewhere and has been unassociated with any other symptoms. He denies any stiffness or swelling and has had no changes in his strength or sensation. He is RHD    Labs  Most recent vitamin D 23    The following portions of the patient's history were reviewed and updated as appropriate: allergies, current medications, past medical history, past social history, past surgical history and problem list.    Review of Systems   Constitutional: Positive for fatigue. Negative for appetite change, chills, fever and unexpected weight change.   HENT: Negative for congestion, ear pain, rhinorrhea, sneezing, sore throat and voice change.    Eyes: Negative for visual disturbance.   Respiratory: Positive for cough, shortness of breath and wheezing.    Cardiovascular: Negative for chest pain, palpitations and leg swelling.   Gastrointestinal: Negative for abdominal pain, blood in stool, constipation, diarrhea, nausea and vomiting.   Endocrine: Negative for polydipsia and polyuria.   Genitourinary: Negative for difficulty urinating, dysuria, frequency, hematuria and urgency.   Musculoskeletal: Positive for arthralgias and back pain. Negative for joint swelling, myalgias and neck pain.   Skin: Negative for color change.   Neurological: Negative for tremors, weakness, numbness and headaches.    Psychiatric/Behavioral: Negative for dysphoric mood, sleep disturbance and suicidal ideas. The patient is not nervous/anxious.      Objective   Physical Exam   Constitutional: He is oriented to person, place, and time. He appears well-developed and well-nourished. He is cooperative. He does not have a sickly appearance. No distress.   In fair spirits.  No apparent distress.  No pallor, cyanosis, diaphoresis, or jaundice.   HENT:   Head: Atraumatic.   Right Ear: Tympanic membrane, external ear and ear canal normal.   Left Ear: Tympanic membrane, external ear and ear canal normal.   Mouth/Throat: Oropharynx is clear and moist.   Eyes: EOM are normal. Pupils are equal, round, and reactive to light. No scleral icterus.   Neck: No JVD present. Carotid bruit is not present. No tracheal deviation present. No thyromegaly present.   Cardiovascular: Normal rate, regular rhythm, S1 normal, S2 normal, normal heart sounds and intact distal pulses.  Exam reveals no gallop.    No murmur heard.  Pulmonary/Chest: No accessory muscle usage. No respiratory distress. He has decreased breath sounds in the right lower field and the left lower field. He has wheezes (diffuse-primarily with forced expiration). He has no rales.   Mild pulmonary hyperinflation   Abdominal: Soft. Normal aorta and bowel sounds are normal. He exhibits no abdominal bruit and no mass. There is no hepatosplenomegaly. There is no tenderness. No hernia.   Musculoskeletal: He exhibits no deformity.        Right elbow: He exhibits normal range of motion, no swelling, no effusion and no deformity. Tenderness (about the medial condyle and just distally) found.   Lymphadenopathy:        Head (right side): No submandibular adenopathy present.        Head (left side): No submandibular adenopathy present.     He has no cervical adenopathy.   Neurological: He is alert and oriented to person, place, and time. He has normal strength and normal reflexes. He displays normal  reflexes. A sensory deficit (decreased vibration sense both feet) is present. No cranial nerve deficit. He exhibits normal muscle tone. Coordination normal.   Skin: Skin is warm and dry. No rash noted. He is not diaphoretic. No pallor. Nails show no clubbing.   Psychiatric: He has a normal mood and affect. His behavior is normal.     Assessment/Plan   Problems Addressed this Visit        Cardiovascular and Mediastinum    Essential hypertension   Hypertension: at goal. Evidence of target organ damage: coronary artery disease, prior coronary revascularization, heart failure and chronic kidney disease.  Encouraged to continue to work on diet and exercise plan.   Continue current medication   Fasting labs will be updated just prior to his return in 3 months    Relevant Medications    carvedilol (COREG) 12.5 MG tablet    furosemide (LASIX) 40 MG tablet    losartan (COZAAR) 50 MG tablet    spironolactone (ALDACTONE) 25 MG tablet    Other Relevant Orders    CBC & Differential    Comprehensive Metabolic Panel    Mixed hyperlipidemia  As above.  Trial of rosuvastatin     Relevant Medications    rosuvastatin (CRESTOR) 40 MG tablet    Other Relevant Orders    Lipid Panel    CAD (coronary artery disease)  Coronary artery disease is stable.  Reminded regarding the importance of lifestyle modification.  Stop smoking.  Continue current treatment.  Recommended cardiology follow up  - agreed on referral to Dr Marcus    Relevant Medications    carvedilol (COREG) 12.5 MG tablet    Other Relevant Orders    Ambulatory Referral to Cardiology    Chronic systolic congestive heart failure  Congestive heart failure due to coronary artery disease (CAD) and systolic dysfunction and has been complicated by a previous apical wall mural thrombus.  Reminded regarding the importance of lifestyle modification.   Salt avoidance.  Stop smoking.  Will discuss the potential benefits of entresto again at his return  Continue current treatment     Relevant Medications    carvedilol (COREG) 12.5 MG tablet    spironolactone (ALDACTONE) 25 MG tablet    Other Relevant Orders    Ambulatory Referral to Cardiology       Respiratory    Chronic obstructive pulmonary disease  COPD is stable.  Reminded of the importance of smoking cessation  Encouraged to remain as active as symptoms allow for    Chronic allergic rhinitis       Digestive    Vitamin D deficiency  Will continue to monitor    Relevant Orders    Vitamin D 25 Hydroxy    Gastroesophageal reflux disease without esophagitis    Relevant Medications    esomeprazole (nexIUM) 40 MG capsule    Diverticulosis of large intestine without hemorrhage       Endocrine    Type 2 diabetes mellitus with peripheral neuropathy  Diabetes mellitus Type II, under inadequate control.   Encouraged to continue to pursue ADA diet  Encouraged aerobic exercise.  Reviewed options going forward - agred on a trial of a GLP agonist - patient would prefer xultophy - will check his insurance coverage    Relevant Medications    DULoxetine (CYMBALTA) 60 MG capsule    LANTUS 100 UNIT/ML injection    metFORMIN (GLUCOPHAGE) 1000 MG tablet    Other Relevant Orders    Hemoglobin A1c       Nervous and Auditory    Mechanical back pain  Reminded regarding symptomatic treatment.   Will continue current treatment.   Follow up with Mrs Peña    Relevant Medications    oxyMORphone ER (OPANA ER) 20 MG tablet extended-release 12 hour 12 hr tablet       Musculoskeletal and Integument    Medial epicondylitis of right elbow  Advised regarding rest and gentle exercises  Encouraged to report if any worse, any new symptoms, or if not resolving over the next month       Hematopoietic and Hemostatic    Iron deficiency anemia  Will continue to monitor    Relevant Medications    ferrous sulfate 325 (65 FE) MG tablet       Other    Chronic anxiety  Stable. Supportive therapy. Will continue current medication.    Relevant Medications    DULoxetine (CYMBALTA) 60 MG  capsule    Vasculogenic erectile dysfunction    Smoker    Healthcare maintenance  Reminded to get a flu shot when available

## 2017-08-19 VITALS
TEMPERATURE: 96.8 F | DIASTOLIC BLOOD PRESSURE: 65 MMHG | BODY MASS INDEX: 26.67 KG/M2 | HEART RATE: 72 BPM | SYSTOLIC BLOOD PRESSURE: 110 MMHG | HEIGHT: 68 IN | OXYGEN SATURATION: 95 % | WEIGHT: 176 LBS | RESPIRATION RATE: 12 BRPM

## 2017-08-19 PROBLEM — M77.01 MEDIAL EPICONDYLITIS OF RIGHT ELBOW: Status: ACTIVE | Noted: 2017-08-19

## 2017-08-22 ENCOUNTER — TELEPHONE (OUTPATIENT)
Dept: FAMILY MEDICINE CLINIC | Facility: CLINIC | Age: 67
End: 2017-08-22

## 2017-08-22 NOTE — TELEPHONE ENCOUNTER
----- Message from Edy Kline MD sent at 8/21/2017  1:57 PM EDT -----  Please let his/his wife know that yifan sent a script for xultophy and that he can change from the lantus to this anytime that he wishes    ----- Message -----     From: Whitney Bartlett MA     Sent: 8/21/2017   9:19 AM       To: Edy Kline MD    Approved!      ----- Message -----     From: Edy Kline MD     Sent: 8/19/2017   3:17 PM       To: Whitney Bartlett MA    Need to find out if his insurance would cover xultophy  Currently on lantus 50 units daily and is poorly controlled  If he doesn't change from lantus to xultophy he will either need a once weekly GLP agonist or bolus insulin with meals

## 2017-09-18 DIAGNOSIS — M54.9 MECHANICAL BACK PAIN: ICD-10-CM

## 2017-09-18 RX ORDER — OXYCODONE HCL 20 MG/1
20 TABLET, FILM COATED, EXTENDED RELEASE ORAL EVERY 12 HOURS
Qty: 60 TABLET | Refills: 0 | Status: SHIPPED | OUTPATIENT
Start: 2017-09-18 | End: 2017-10-16 | Stop reason: SDUPTHER

## 2017-09-19 ENCOUNTER — OFFICE VISIT (OUTPATIENT)
Dept: FAMILY MEDICINE CLINIC | Facility: CLINIC | Age: 67
End: 2017-09-19

## 2017-09-19 VITALS
DIASTOLIC BLOOD PRESSURE: 70 MMHG | BODY MASS INDEX: 26.22 KG/M2 | SYSTOLIC BLOOD PRESSURE: 114 MMHG | HEART RATE: 71 BPM | OXYGEN SATURATION: 96 % | TEMPERATURE: 97.6 F | HEIGHT: 68 IN | WEIGHT: 173 LBS

## 2017-09-19 DIAGNOSIS — Z23 NEED FOR IMMUNIZATION AGAINST INFLUENZA: Primary | ICD-10-CM

## 2017-09-19 DIAGNOSIS — M54.9 MECHANICAL BACK PAIN: ICD-10-CM

## 2017-09-19 DIAGNOSIS — Z79.899 LONG TERM USE OF DRUG: ICD-10-CM

## 2017-09-19 DIAGNOSIS — I10 ESSENTIAL HYPERTENSION: ICD-10-CM

## 2017-09-19 DIAGNOSIS — I25.10 CORONARY ARTERY DISEASE INVOLVING NATIVE CORONARY ARTERY OF NATIVE HEART WITHOUT ANGINA PECTORIS: ICD-10-CM

## 2017-09-19 PROCEDURE — 99214 OFFICE O/P EST MOD 30 MIN: CPT | Performed by: PHYSICIAN ASSISTANT

## 2017-09-19 PROCEDURE — 90686 IIV4 VACC NO PRSV 0.5 ML IM: CPT | Performed by: PHYSICIAN ASSISTANT

## 2017-09-19 PROCEDURE — G0008 ADMIN INFLUENZA VIRUS VAC: HCPCS | Performed by: PHYSICIAN ASSISTANT

## 2017-09-19 NOTE — PROGRESS NOTES
"Katie Zuñiga is a 66 y.o. male.     History of Present Illness     Chronic back pain -  He complains of chronic lumbar back pain.  Pain is not related to a specific injury.  Symptoms include back pain,stiffness, and decreased ROJM.  He denies urinary incontinence or fecal incontinence.  Pain is located int he low back.  Radiation to left leg. Pain is described as varying from mild to severe burning and throbbing. Onset x years.  Symptoms exacerbated by standing and walking.  Symptoms alleviated by rest and opioid analgesics.  Associated symptoms include sexual dysfunction.  Pain causes functional limitation including general activity, mood, walking ability, work, and activities of daily living.    9/4/09 MRI lumbar spine = disc bulging L4/5 and L5/S1 with central protrusion of disc and facet arthropathy involving L3-S1    CAD - stable    Hypertension - stable    /70 (BP Location: Left arm, Patient Position: Sitting, Cuff Size: Adult)  Pulse 71  Temp 97.6 °F (36.4 °C) (Oral)   Ht 67.5\" (171.5 cm)  Wt 173 lb (78.5 kg)  SpO2 96%  BMI 26.7 kg/m2    The following portions of the patient's history were reviewed and updated as appropriate: allergies, current medications, past family history, past medical history, past social history, past surgical history and problem list.    Review of Systems   Constitutional: Negative for activity change, appetite change and fever.   HENT: Negative for ear pain, sinus pressure and sore throat.    Eyes: Negative for pain and visual disturbance.   Respiratory: Negative for cough and chest tightness.    Cardiovascular: Negative for chest pain and palpitations.   Gastrointestinal: Negative for abdominal pain, constipation, diarrhea, nausea and vomiting.   Endocrine: Negative for polydipsia and polyuria.   Genitourinary: Negative for dysuria and frequency.   Musculoskeletal: Positive for back pain. Negative for myalgias.   Skin: Negative for color change and rash.    " "         Allergic/Immunologic: Negative for food allergies and immunocompromised state.   Neurological: Negative for dizziness, syncope and headaches.   Hematological: Negative for adenopathy. Does not bruise/bleed easily.   Psychiatric/Behavioral: Negative for hallucinations and suicidal ideas. The patient is not nervous/anxious.        /70 (BP Location: Left arm, Patient Position: Sitting, Cuff Size: Adult)  Pulse 71  Temp 97.6 °F (36.4 °C) (Oral)   Ht 67.5\" (171.5 cm)  Wt 173 lb (78.5 kg)  SpO2 96%  BMI 26.7 kg/m2    Objective   Physical Exam   Constitutional: He is oriented to person, place, and time. He appears well-developed and well-nourished.   HENT:   Head: Normocephalic and atraumatic.   Nose: Nose normal.   Mouth/Throat: Oropharynx is clear and moist.   Eyes: Conjunctivae and EOM are normal. Pupils are equal, round, and reactive to light.   Neck: Normal range of motion. Neck supple. No tracheal deviation present. No thyromegaly present.   Cardiovascular: Normal rate, regular rhythm, normal heart sounds and intact distal pulses.    No murmur heard.  Pulmonary/Chest: Effort normal and breath sounds normal. No respiratory distress. He has no wheezes.   Abdominal: Soft. Bowel sounds are normal. There is no tenderness. There is no guarding.   Musculoskeletal: Normal range of motion. He exhibits tenderness (lumbar musculature diffusely tender). He exhibits no edema.   Lymphadenopathy:     He has no cervical adenopathy.   Neurological: He is alert and oriented to person, place, and time.   Skin: Skin is warm and dry. No rash noted.   Psychiatric: He has a normal mood and affect. His behavior is normal.   Nursing note and vitals reviewed.      Assessment/Plan   Diagnoses and all orders for this visit:    Need for immunization against influenza  -     Flu Vaccine Quad PF 3YR+ (FLURIX/FLUZONE 1161-9782)    Mechanical back pain    Coronary artery disease involving native coronary artery of native heart " without angina pectoris    Essential hypertension    Long term use of drug  -     Urine Drug Screen    Discontinue Opana secondary to it no longer being available.  Start oxycodone ER (OxyContin) 20 mg 1 tablet every 12 hours #60 with no refills written by Dr. Edy Kirk #[id number] Reviewed and is consistent.  UDS 6/20/17 reviewed and is consistent.  Patient comfort assessment guide reviewed and updated today.    As part of the patient's treatment plan they are being prescribed a controlled substance/ substances with potential for abuse.  This patient has been made aware of the appropriate use of such medications, including potential risk of somnolence, limited ability to drive and/or work safely, and potential for overdose.  It has also been made clear these medications are for use by the patient only, without concomitant use of alcohol or other substances unless prescribed/advised by medical provider.    Patient has completed prescribing agreement detailing terms of continued prescribing of controlled substances including monitoring DANAE reports, urine drug screens, and pill counts.  The patient is aware DANAE reports are reviewed on a regular basis and scanned into the chart.    History and physical exam exhibit continued safe and appropriate use of controlled substances.

## 2017-09-25 ENCOUNTER — OFFICE VISIT (OUTPATIENT)
Dept: CARDIOLOGY | Facility: CLINIC | Age: 67
End: 2017-09-25

## 2017-09-25 VITALS
OXYGEN SATURATION: 96 % | WEIGHT: 174 LBS | HEIGHT: 68 IN | BODY MASS INDEX: 26.37 KG/M2 | DIASTOLIC BLOOD PRESSURE: 78 MMHG | SYSTOLIC BLOOD PRESSURE: 124 MMHG | HEART RATE: 71 BPM

## 2017-09-25 DIAGNOSIS — I10 ESSENTIAL HYPERTENSION: ICD-10-CM

## 2017-09-25 DIAGNOSIS — F17.200 SMOKER: ICD-10-CM

## 2017-09-25 DIAGNOSIS — E78.2 MIXED HYPERLIPIDEMIA: ICD-10-CM

## 2017-09-25 DIAGNOSIS — I25.10 CORONARY ARTERY DISEASE INVOLVING NATIVE CORONARY ARTERY OF NATIVE HEART WITHOUT ANGINA PECTORIS: Primary | ICD-10-CM

## 2017-09-25 DIAGNOSIS — I50.22 CHRONIC SYSTOLIC CONGESTIVE HEART FAILURE (HCC): ICD-10-CM

## 2017-09-25 PROCEDURE — 99204 OFFICE O/P NEW MOD 45 MIN: CPT | Performed by: INTERNAL MEDICINE

## 2017-09-25 PROCEDURE — 93000 ELECTROCARDIOGRAM COMPLETE: CPT | Performed by: INTERNAL MEDICINE

## 2017-09-25 RX ORDER — ROSUVASTATIN CALCIUM 20 MG/1
20 TABLET, COATED ORAL DAILY
Qty: 30 TABLET | Refills: 11 | Status: SHIPPED | OUTPATIENT
Start: 2017-09-25 | End: 2018-10-05 | Stop reason: SDUPTHER

## 2017-09-25 RX ORDER — FUROSEMIDE 40 MG/1
40 TABLET ORAL DAILY
Qty: 60 TABLET | Refills: 5 | Status: SHIPPED | OUTPATIENT
Start: 2017-09-25 | End: 2018-05-08 | Stop reason: ALTCHOICE

## 2017-09-25 NOTE — PROGRESS NOTES
Subjective   Chief Complaint   Patient presents with   • Coronary Artery Disease   • Hypertension       History of Present Illness  Patient is 66 years old white male who states that he has long-standing history of coronary artery disease patient does not remember the details but he had coronary artery bypass graft surgery by Dr. Phan at [facility] in 2001.  Patient has not been following with any cardiologist.    3 years ago he had a myocardial infarction and possibly stroke at [facility].  He did not require any stents at that time.    Around 2 years ago he was at  for coronary was found to have heart failure ejection fraction only 10%.  There was blood clot in his heart.  He has been taking Coumadin since then.  Heart transplant registry was offered but patient declined.  He does not have any ICD.    Patient is here today for cardiology consult because of weakness tiredness and fatigue.  Patient states that he has no energy.  He gets very tired and fatigued easily.  He has no specific chest pains but often on he gets as if he cannot breathe.    His last coronary angiography was at  3 years ago apparently there were no new blockages he did not require any stent.  In last 3 years he has had no further cardiac workup.    Apparently he was found to have pleural fluid or ascites. patient is not sure which one.  He was was at [facility].    Patient states that his blood pressure has been good.  He has been taking Coreg 12.5 twice a day and losartan 50 mg daily.      Cholesterol has been markedly elevated.  It has been over 300 with triglyceride of 1000 however he is not taking any medications.    Patient also is diabetic on metformin and insulin.    Past Surgical History:   Procedure Laterality Date   • CARDIAC SURGERY      Open heart      Family History   Problem Relation Age of Onset   • Vision loss Other    • Coronary artery disease Other    • Diabetes Mother    • Arthritis Mother     • Heart attack Father    • Heart disease Sister    • Seizures Sister    • Heart disease Brother      Past Medical History:   Diagnosis Date   • Allergic rhinitis    • Anxiety    • CAD (coronary artery disease)    • CHF (congestive heart failure)    • COPD (chronic obstructive pulmonary disease)    • Diabetes mellitus     TYPE ll   • Diverticulosis    • Erectile dysfunction    • Gastritis    • GERD (gastroesophageal reflux disease)    • Hx of long-term (current) use of anticoagulants    • Hyperlipidemia    • Hypertension    • Iron deficiency    • Mechanical back pain    • Non-small cell carcinoma of lung    • Vitamin D deficiency        Patient Active Problem List   Diagnosis   • Essential hypertension   • Mixed hyperlipidemia   • CAD (coronary artery disease)   • Chronic systolic congestive heart failure   • Chronic obstructive pulmonary disease with acute exacerbation   • Type 2 diabetes mellitus with peripheral neuropathy   • Vitamin D deficiency   • Non-small cell carcinoma of lung   • Mechanical back pain   • Gastroesophageal reflux disease without esophagitis   • Chronic allergic rhinitis   • Chronic anxiety   • Diverticulosis of large intestine without hemorrhage   • Vasculogenic erectile dysfunction   • Smoker   • Healthcare maintenance   • Iron deficiency anemia   • Long term current use of anticoagulant   • Medial epicondylitis of right elbow         Social History   Substance Use Topics   • Smoking status: Current Every Day Smoker     Packs/day: 0.50     Years: 50.00     Types: Cigarettes   • Smokeless tobacco: Never Used   • Alcohol use No         The following portions of the patient's history were reviewed and updated as appropriate: allergies, current medications, past family history, past medical history, past social history, past surgical history and problem list.    No Known Allergies      Current Outpatient Prescriptions:   •  aspirin 81 MG chewable tablet, Chew 1 tablet daily., Disp: 30 tablet,  "Rfl: 5  •  carvedilol (COREG) 12.5 MG tablet, Take 1 tablet by mouth 2 (Two) Times a Day With Meals., Disp: 60 tablet, Rfl: 5  •  DULoxetine (CYMBALTA) 60 MG capsule, Take 1 capsule by mouth Daily., Disp: 30 capsule, Rfl: 5  •  esomeprazole (nexIUM) 40 MG capsule, Take 1 capsule by mouth 2 (Two) Times a Day., Disp: 60 capsule, Rfl: 5  •  ferrous sulfate 325 (65 FE) MG tablet, Take 1 tablet by mouth 3 (Three) Times a Day With Meals., Disp: 90 tablet, Rfl: 5  •  furosemide (LASIX) 40 MG tablet, Take 1 tablet by mouth Daily., Disp: 60 tablet, Rfl: 5  •  Insulin Degludec-Liraglutide (XULTOPHY) 100-3.6 UNIT-MG/ML solution pen-injector, Inject 50 Units under the skin Daily., Disp: 15 mL, Rfl: 5  •  insulin lispro (humaLOG) 100 UNIT/ML injection, Inject 10 Units under the skin 3 (Three) Times a Day Before Meals., Disp: 10 mL, Rfl: 5  •  Insulin Pen Needle 32G X 4 MM misc, 1 each 4 (Four) Times a Day., Disp: 120 each, Rfl: 5  •  Insulin Syringe 28G X 1/2\" 0.5 ML misc, 2 (two) times a day., Disp: , Rfl:   •  Lancet Device misc, daily., Disp: , Rfl:   •  metFORMIN (GLUCOPHAGE) 1000 MG tablet, Take 1 tablet by mouth 2 (Two) Times a Day With Meals., Disp: 60 tablet, Rfl: 5  •  oxyCODONE ER (oxyCONTIN) 20 MG 12 hr tablet, Take 1 tablet by mouth Every 12 (Twelve) Hours., Disp: 60 tablet, Rfl: 0  •  warfarin (COUMADIN) 1 MG tablet, Take 1 tablet by mouth Daily., Disp: 30 tablet, Rfl: 5  •  warfarin (COUMADIN) 4 MG tablet, Take 1 tablet by mouth Daily., Disp: 30 tablet, Rfl: 5  •  rosuvastatin (CRESTOR) 20 MG tablet, Take 1 tablet by mouth Daily., Disp: 30 tablet, Rfl: 11  •  sacubitril-valsartan (ENTRESTO) 24-26 MG tablet, Take 1 tablet by mouth 2 (Two) Times a Day., Disp: 60 tablet, Rfl: 1    Review of Systems   Constitution: Positive for weakness and malaise/fatigue.   HENT: Negative.  Negative for congestion.    Eyes: Negative.    Cardiovascular: Positive for dyspnea on exertion. Negative for chest pain, cyanosis, irregular " "heartbeat, leg swelling, near-syncope, orthopnea, palpitations, paroxysmal nocturnal dyspnea and syncope.   Respiratory: Positive for shortness of breath.    Hematologic/Lymphatic: Negative.    Skin: Negative.    Musculoskeletal: Negative.    Gastrointestinal: Negative.    Genitourinary: Negative.    Neurological: Negative for headaches.   Psychiatric/Behavioral: Negative.         Objective      /78  Pulse 71  Ht 67.5\" (171.5 cm)  Wt 174 lb (78.9 kg)  SpO2 96%  BMI 26.85 kg/m2    Physical Exam   Constitutional: He appears well-developed and well-nourished. No distress.   HENT:   Head: Normocephalic and atraumatic.   Mouth/Throat: Oropharynx is clear and moist. No oropharyngeal exudate.   Eyes: Conjunctivae and EOM are normal. Pupils are equal, round, and reactive to light. No scleral icterus.   Neck: Normal range of motion. Neck supple. No JVD present. No tracheal deviation present. No thyromegaly present.   Cardiovascular: Normal rate, regular rhythm, normal heart sounds and intact distal pulses.  PMI is not displaced.  Exam reveals no gallop, no friction rub and no decreased pulses.    No murmur heard.  Pulses:       Carotid pulses are 3+ on the right side, and 3+ on the left side.       Radial pulses are 3+ on the right side, and 3+ on the left side.   Pulmonary/Chest: Effort normal and breath sounds normal. No respiratory distress. He has no wheezes. He has no rales. He exhibits no tenderness.   Abdominal: Soft. Bowel sounds are normal. He exhibits no distension, no abdominal bruit and no mass. There is no splenomegaly or hepatomegaly. There is no tenderness. There is no rebound and no guarding.   Musculoskeletal: Normal range of motion. He exhibits no edema, tenderness or deformity.   Lymphadenopathy:     He has no cervical adenopathy.   Neurological: He is alert. He has normal reflexes. No cranial nerve deficit. He exhibits normal muscle tone. Coordination normal.   Skin: Skin is warm and dry. No " rash noted. He is not diaphoretic. No erythema.   Psychiatric: He has a normal mood and affect. His behavior is normal. Judgment and thought content normal.       Lab Review:    Lab Results   Component Value Date     08/10/2017    K 4.0 08/10/2017     08/10/2017    BUN 30 (H) 08/10/2017    CREATININE 1.40 (H) 08/10/2017     (H) 08/10/2017    GLUCOSE 219 (H) 04/11/2017    CALCIUM 10.5 (H) 08/10/2017    ALT 42 08/10/2017    ALKPHOS 63 08/10/2017    LABIL2 1.7 08/10/2017     No results found for: CKTOTAL  Lab Results   Component Value Date    WBC 6.58 04/11/2017    HGB 13.9 (L) 04/11/2017    HCT 47.5 04/11/2017     04/11/2017     Lab Results   Component Value Date    INR 2.04 (H) 02/09/2017    INR 1.16 (H) 01/03/2017    INR 3.0 (A) 12/20/2016     No results found for: MG  Lab Results   Component Value Date    CHOL 305 (H) 04/11/2017    CHLPL 387 (H) 08/10/2017    TRIG 1312 (H) 08/10/2017    HDL 31 (L) 08/10/2017    LDLCALC  04/11/2017      Comment:      Unable to calculate    VLDL CANCELED 08/10/2017    LDLHDL  04/11/2017      Comment:      Unable to calculate     No results found for: BNP      ECG 12 Lead  Date/Time: 9/25/2017 4:12 PM  Performed by: MU ENGLISH  Authorized by: MU ENGLISH   Rhythm: sinus rhythm  Rate: normal  Conduction: incomplete LBBB  QRS axis: normal  Other findings: LAE  Clinical impression: abnormal ECG and myocardial infarction  Comments: Diffuse ST and T wave changes            I reviewed the patient's new clinical results.  I personally viewed and interpreted the patient's EKG/lab data        Assessment:   Diagnosis Plan   1. Coronary artery disease involving native coronary artery of native heart without angina pectoris  ECG 12 Lead   2. Chronic systolic congestive heart failure  Adult Transthoracic Echo Complete W/ Cont if Necessary Per Protocol   3. Essential hypertension     4. Mixed hyperlipidemia     5. Smoker            Plan:  Labs  reviewed and discussed with the patient  Cholesterol is 387 and triglyceride 1312.  Patient is not on any medications he is not taking any statin therapy or fenofibrate.    Patient was advised to start taking Crestor 20 mg daily and discussed with Dr. Kauffman    Old records will be obtained from [facility]  Will also get echocardiogram for further evaluation.    Patient has renal failure with creatinine 1.4  Lasix was decreased to 40 mg daily instead of 80 mg daily.    Losartan was discontinued  Patient was started on entresto 24/26 twice a day samples were given    ICD was very strongly recommended.  We'll discuss again after echocardiogram        Return in about 2 weeks (around 10/9/2017).

## 2017-09-27 ENCOUNTER — DOCUMENTATION (OUTPATIENT)
Dept: FAMILY MEDICINE CLINIC | Facility: CLINIC | Age: 67
End: 2017-09-27

## 2017-09-27 NOTE — PROGRESS NOTES
Did P A on patient's  Oxycontin. Approved Your request has been approved   Questionnaire submitted. PA Case [id number] Status: Approved  PKF

## 2017-10-02 ENCOUNTER — HOSPITAL ENCOUNTER (OUTPATIENT)
Dept: CARDIOLOGY | Facility: HOSPITAL | Age: 67
Discharge: HOME | End: 2017-10-02
Attending: INTERNAL MEDICINE | Admitting: INTERNAL MEDICINE

## 2017-10-02 DIAGNOSIS — I50.22 CHRONIC SYSTOLIC CONGESTIVE HEART FAILURE (HCC): ICD-10-CM

## 2017-10-02 PROCEDURE — 93306 TTE W/DOPPLER COMPLETE: CPT

## 2017-10-02 PROCEDURE — 93306 TTE W/DOPPLER COMPLETE: CPT | Performed by: INTERNAL MEDICINE

## 2017-10-03 LAB
BH CV ECHO MEAS - % IVS THICK: 16.9 %
BH CV ECHO MEAS - % LVPW THICK: 37.6 %
BH CV ECHO MEAS - ACS: 2.3 CM
BH CV ECHO MEAS - AO ROOT AREA (BSA CORRECTED): 1.9
BH CV ECHO MEAS - AO ROOT AREA: 10 CM^2
BH CV ECHO MEAS - AO ROOT DIAM: 3.6 CM
BH CV ECHO MEAS - BSA(HAYCOCK): 1.9 M^2
BH CV ECHO MEAS - BSA: 1.9 M^2
BH CV ECHO MEAS - BZI_BMI: 27.3 KILOGRAMS/M^2
BH CV ECHO MEAS - BZI_METRIC_HEIGHT: 170.2 CM
BH CV ECHO MEAS - BZI_METRIC_WEIGHT: 78.9 KG
BH CV ECHO MEAS - CONTRAST EF 4CH: 34.3 ML/M^2
BH CV ECHO MEAS - EDV(CUBED): 228.3 ML
BH CV ECHO MEAS - EDV(MOD-SP4): 137 ML
BH CV ECHO MEAS - EDV(TEICH): 187.7 ML
BH CV ECHO MEAS - EF(CUBED): 32.5 %
BH CV ECHO MEAS - EF(MOD-SP4): 34.3 %
BH CV ECHO MEAS - EF(TEICH): 26 %
BH CV ECHO MEAS - ESV(CUBED): 154.1 ML
BH CV ECHO MEAS - ESV(MOD-SP4): 90 ML
BH CV ECHO MEAS - ESV(TEICH): 139 ML
BH CV ECHO MEAS - FS: 12.3 %
BH CV ECHO MEAS - IVS/LVPW: 1.2
BH CV ECHO MEAS - IVSD: 1.3 CM
BH CV ECHO MEAS - IVSS: 1.5 CM
BH CV ECHO MEAS - LA DIMENSION: 4.6 CM
BH CV ECHO MEAS - LA/AO: 1.3
BH CV ECHO MEAS - LV DIASTOLIC VOL/BSA (35-75): 71.9 ML/M^2
BH CV ECHO MEAS - LV MASS(C)D: 318.8 GRAMS
BH CV ECHO MEAS - LV MASS(C)DI: 167.3 GRAMS/M^2
BH CV ECHO MEAS - LV MASS(C)S: 357.9 GRAMS
BH CV ECHO MEAS - LV MASS(C)SI: 187.8 GRAMS/M^2
BH CV ECHO MEAS - LV SYSTOLIC VOL/BSA (12-30): 47.2 ML/M^2
BH CV ECHO MEAS - LVIDD: 6.1 CM
BH CV ECHO MEAS - LVIDS: 5.4 CM
BH CV ECHO MEAS - LVLD AP4: 8.7 CM
BH CV ECHO MEAS - LVLS AP4: 8.5 CM
BH CV ECHO MEAS - LVOT AREA (M): 3.5 CM^2
BH CV ECHO MEAS - LVOT AREA: 3.5 CM^2
BH CV ECHO MEAS - LVOT DIAM: 2.1 CM
BH CV ECHO MEAS - LVPWD: 1.1 CM
BH CV ECHO MEAS - LVPWS: 1.5 CM
BH CV ECHO MEAS - MV A MAX VEL: 90.8 CM/SEC
BH CV ECHO MEAS - MV E MAX VEL: 39.5 CM/SEC
BH CV ECHO MEAS - MV E/A: 0.43
BH CV ECHO MEAS - PA ACC SLOPE: 1239 CM/SEC^2
BH CV ECHO MEAS - PA ACC TIME: 0.08 SEC
BH CV ECHO MEAS - PA PR(ACCEL): 44.1 MMHG
BH CV ECHO MEAS - RAP SYSTOLE: 10 MMHG
BH CV ECHO MEAS - RVDD: 2.6 CM
BH CV ECHO MEAS - RVSP: 28.4 MMHG
BH CV ECHO MEAS - SI(CUBED): 38.9 ML/M^2
BH CV ECHO MEAS - SI(MOD-SP4): 24.7 ML/M^2
BH CV ECHO MEAS - SI(TEICH): 25.6 ML/M^2
BH CV ECHO MEAS - SV(CUBED): 74.1 ML
BH CV ECHO MEAS - SV(MOD-SP4): 47 ML
BH CV ECHO MEAS - SV(TEICH): 48.7 ML
BH CV ECHO MEAS - TR MAX VEL: 214.5 CM/SEC
LV EF 2D ECHO EST: 26 %

## 2017-10-09 ENCOUNTER — OFFICE VISIT (OUTPATIENT)
Dept: CARDIOLOGY | Facility: CLINIC | Age: 67
End: 2017-10-09

## 2017-10-09 VITALS
DIASTOLIC BLOOD PRESSURE: 66 MMHG | BODY MASS INDEX: 25.85 KG/M2 | OXYGEN SATURATION: 96 % | WEIGHT: 170.6 LBS | HEART RATE: 77 BPM | HEIGHT: 68 IN | SYSTOLIC BLOOD PRESSURE: 122 MMHG

## 2017-10-09 DIAGNOSIS — E78.2 MIXED HYPERLIPIDEMIA: ICD-10-CM

## 2017-10-09 DIAGNOSIS — I10 ESSENTIAL HYPERTENSION: ICD-10-CM

## 2017-10-09 DIAGNOSIS — I50.22 CHRONIC SYSTOLIC CONGESTIVE HEART FAILURE (HCC): Primary | ICD-10-CM

## 2017-10-09 DIAGNOSIS — I25.10 CORONARY ARTERY DISEASE INVOLVING NATIVE CORONARY ARTERY OF NATIVE HEART WITHOUT ANGINA PECTORIS: ICD-10-CM

## 2017-10-09 DIAGNOSIS — I73.9 PAD (PERIPHERAL ARTERY DISEASE) (HCC): ICD-10-CM

## 2017-10-09 PROCEDURE — 99214 OFFICE O/P EST MOD 30 MIN: CPT | Performed by: INTERNAL MEDICINE

## 2017-10-09 NOTE — PROGRESS NOTES
subjective     Chief Complaint   Patient presents with   • Follow-up   • Coronary Artery Disease   • Hyperlipidemia     History of Present Illness    Patient was seen by me about a month ago.  Patient had ischemic cardiomyopathy with severe LV dysfunction.  He stated that his LV ejection fraction was only around 10%.  He declined ICD placement.  Patient was seen by me because of shortness of breath and fatigue tiredness like of energy.  Medications were adjusted and patient was started on entresto.  He returns today he is feeling better.  He still complains of being tired and fatigued but quite a bit better than last time.  Echo was repeated ejection fraction is now around 25%.  He still does not wish to have ICD.    Patient had initial coronary artery bypass graft surgery in 2001 he did not follow with any cardiologist.  3 years ago in 2014 he had myocardial infarction and possibly a stroke and was treated at [facility].    In 2015 patient was found to have congestive heart failure LV ejection fraction was 10%.  He was told that he has a blood clot in his heart and he has been taking Coumadin since then.  He was offered heart transplant registry.  Patient declined.  He also declined ICD.  He apparently had coronary angiography at  at that time and was told that he has no new blockages and does not require any intervention.    His blood pressure has been very good with entresto and Coreg    He has hyperlipidemia but had not been taking any medications.   last visit statin therapy was initiated.  Patient is tolerating it very well.  He also is diabetic.  Patient complains of leg pain he thinks he has poor circulation he describes his feet being cold and when he walks to hurt.      Past Surgical History:   Procedure Laterality Date   • CARDIAC SURGERY      Open heart      Family History   Problem Relation Age of Onset   • Vision loss Other    • Coronary artery disease Other    • Diabetes Mother    • Arthritis  Mother    • Heart attack Father    • Heart disease Sister    • Seizures Sister    • Heart disease Brother      Past Medical History:   Diagnosis Date   • Allergic rhinitis    • Anxiety    • CAD (coronary artery disease)    • CHF (congestive heart failure)    • COPD (chronic obstructive pulmonary disease)    • Diabetes mellitus     TYPE ll   • Diverticulosis    • Erectile dysfunction    • Gastritis    • GERD (gastroesophageal reflux disease)    • Hx of long-term (current) use of anticoagulants    • Hyperlipidemia    • Hypertension    • Iron deficiency    • Mechanical back pain    • Non-small cell carcinoma of lung    • Vitamin D deficiency      Patient Active Problem List   Diagnosis   • Essential hypertension   • Mixed hyperlipidemia   • CAD (coronary artery disease)   • Chronic systolic congestive heart failure   • Chronic obstructive pulmonary disease with acute exacerbation   • Type 2 diabetes mellitus with peripheral neuropathy   • Vitamin D deficiency   • Non-small cell carcinoma of lung   • Mechanical back pain   • Gastroesophageal reflux disease without esophagitis   • Chronic allergic rhinitis   • Chronic anxiety   • Diverticulosis of large intestine without hemorrhage   • Vasculogenic erectile dysfunction   • Smoker   • Healthcare maintenance   • Iron deficiency anemia   • Long term current use of anticoagulant   • Medial epicondylitis of right elbow       Social History   Substance Use Topics   • Smoking status: Current Every Day Smoker     Packs/day: 0.50     Years: 50.00     Types: Cigarettes   • Smokeless tobacco: Never Used   • Alcohol use No       No Known Allergies    Current Outpatient Prescriptions on File Prior to Visit   Medication Sig   • aspirin 81 MG chewable tablet Chew 1 tablet daily.   • carvedilol (COREG) 12.5 MG tablet Take 1 tablet by mouth 2 (Two) Times a Day With Meals.   • DULoxetine (CYMBALTA) 60 MG capsule Take 1 capsule by mouth Daily.   • esomeprazole (nexIUM) 40 MG capsule Take 1  "capsule by mouth 2 (Two) Times a Day.   • ferrous sulfate 325 (65 FE) MG tablet Take 1 tablet by mouth 3 (Three) Times a Day With Meals.   • furosemide (LASIX) 40 MG tablet Take 1 tablet by mouth Daily.   • Insulin Degludec-Liraglutide (XULTOPHY) 100-3.6 UNIT-MG/ML solution pen-injector Inject 50 Units under the skin Daily.   • insulin lispro (humaLOG) 100 UNIT/ML injection Inject 10 Units under the skin 3 (Three) Times a Day Before Meals.   • Insulin Pen Needle 32G X 4 MM misc 1 each 4 (Four) Times a Day.   • Insulin Syringe 28G X 1/2\" 0.5 ML misc 2 (two) times a day.   • Lancet Device misc daily.   • metFORMIN (GLUCOPHAGE) 1000 MG tablet Take 1 tablet by mouth 2 (Two) Times a Day With Meals.   • oxyCODONE ER (oxyCONTIN) 20 MG 12 hr tablet Take 1 tablet by mouth Every 12 (Twelve) Hours.   • rosuvastatin (CRESTOR) 20 MG tablet Take 1 tablet by mouth Daily.   • sacubitril-valsartan (ENTRESTO) 24-26 MG tablet Take 1 tablet by mouth 2 (Two) Times a Day.   • warfarin (COUMADIN) 1 MG tablet Take 1 tablet by mouth Daily.   • warfarin (COUMADIN) 4 MG tablet Take 1 tablet by mouth Daily.     No current facility-administered medications on file prior to visit.          The following portions of the patient's history were reviewed and updated as appropriate: allergies, current medications, past family history, past medical history, past social history, past surgical history and problem list.    Review of Systems   Constitution: Positive for weakness and malaise/fatigue.   HENT: Negative.  Negative for congestion.    Eyes: Negative.    Cardiovascular: Positive for dyspnea on exertion. Negative for chest pain, cyanosis, irregular heartbeat, leg swelling, near-syncope, orthopnea, palpitations, paroxysmal nocturnal dyspnea and syncope.   Respiratory: Positive for shortness of breath.    Endocrine: Negative.    Hematologic/Lymphatic: Negative.    Skin: Negative.    Musculoskeletal: Positive for myalgias.   Gastrointestinal: " "Negative.    Neurological: Negative for headaches.          Objective:     /66 (BP Location: Left arm, Patient Position: Sitting)  Pulse 77  Ht 67.5\" (171.5 cm)  Wt 170 lb 9.6 oz (77.4 kg)  SpO2 96%  BMI 26.33 kg/m2  Physical Exam   Constitutional: He appears well-developed and well-nourished. No distress.   HENT:   Head: Normocephalic and atraumatic.   Mouth/Throat: Oropharynx is clear and moist. No oropharyngeal exudate.   Eyes: Conjunctivae and EOM are normal. Pupils are equal, round, and reactive to light. No scleral icterus.   Neck: Normal range of motion. Neck supple. No JVD present. No tracheal deviation present. No thyromegaly present.   Cardiovascular: Normal rate, regular rhythm, normal heart sounds and intact distal pulses.  PMI is not displaced.  Exam reveals no gallop, no friction rub and no decreased pulses.    No murmur heard.  Pulses:       Carotid pulses are 3+ on the right side, and 3+ on the left side.       Radial pulses are 3+ on the right side, and 3+ on the left side.   Pulmonary/Chest: Effort normal and breath sounds normal. No respiratory distress. He has no wheezes. He has no rales. He exhibits no tenderness.   Abdominal: Soft. Bowel sounds are normal. He exhibits no distension, no abdominal bruit and no mass. There is no splenomegaly or hepatomegaly. There is no tenderness. There is no rebound and no guarding.   Musculoskeletal: Normal range of motion. He exhibits no edema, tenderness or deformity.   Lymphadenopathy:     He has no cervical adenopathy.   Neurological: He is alert. He has normal reflexes. No cranial nerve deficit. He exhibits normal muscle tone. Coordination normal.   Skin: Skin is warm and dry. No rash noted. He is not diaphoretic. No erythema.   Psychiatric: He has a normal mood and affect. His behavior is normal. Judgment and thought content normal.         Lab Review  Lab Results   Component Value Date     08/10/2017    K 4.0 08/10/2017     " 08/10/2017    BUN 30 (H) 08/10/2017    CREATININE 1.40 (H) 08/10/2017    GLUCOSE 219 (H) 04/11/2017     (H) 08/10/2017    CALCIUM 10.5 (H) 08/10/2017    ALT 42 08/10/2017    ALKPHOS 63 08/10/2017    LABIL2 1.7 08/10/2017     No results found for: CKTOTAL  Lab Results   Component Value Date    WBC 6.58 04/11/2017    HGB 13.9 (L) 04/11/2017    HCT 47.5 04/11/2017     04/11/2017     Lab Results   Component Value Date    INR 2.04 (H) 02/09/2017    INR 1.16 (H) 01/03/2017    INR 3.0 (A) 12/20/2016     No results found for: MG  Lab Results   Component Value Date    TSH 3.088 04/11/2017     No results found for: BNP  Lab Results   Component Value Date    CHLPL 387 (H) 08/10/2017    CHOL 305 (H) 04/11/2017    TRIG 1312 (H) 08/10/2017    HDL 31 (L) 08/10/2017    LDLCALC  04/11/2017      Comment:      Unable to calculate    VLDL CANCELED 08/10/2017    LDLHDL  04/11/2017      Comment:      Unable to calculate         Procedures  Narrative:  Echocardiogram October 2, 2017    · The left ventricular cavity is mildly dilated.  · Left ventricular systolic function is severely decreased. Estimated EF =   26%.  · Left ventricular diastolic dysfunction (grade I) consistent with   impaired relaxation.  · Left atrial cavity size is moderately dilated.  · No significant valvular heart disease  · There is no evidence of pericardial effusion.          I personally viewed and interpreted the patient's LAB data         Assessment:     1. Chronic systolic congestive heart failure    2. Essential hypertension    3. Mixed hyperlipidemia    4. Coronary artery disease involving native coronary artery of native heart without angina pectoris    5. PAD (peripheral artery disease)          Plan:      Patient has ischemic cardiomyopathy with severe LV dysfunction.  LV ejection fraction is around 26%.  He still refuses ICD consideration.  He was started on entresto and is feeling much better.  Entresto along with diuretic therapy and  Coreg will be continued.    Blood pressure is very well controlled.  Patient has hyperlipidemia which has been untreated but he now is taking Crestor.  He is tolerating it very well and will continue taking it lab work will be checked at Dr. Kauffman's office before next visit.  Order slip was given.    Patient also complains of PAD symptoms.  Will check arterial Doppler.  Follow-up scheduled          Return in about 3 months (around 1/9/2018).

## 2017-10-14 ENCOUNTER — RESULTS ENCOUNTER (OUTPATIENT)
Dept: CARDIOLOGY | Facility: CLINIC | Age: 67
End: 2017-10-14

## 2017-10-14 DIAGNOSIS — E78.2 MIXED HYPERLIPIDEMIA: ICD-10-CM

## 2017-10-16 RX ORDER — OXYCODONE HCL 20 MG/1
20 TABLET, FILM COATED, EXTENDED RELEASE ORAL EVERY 12 HOURS
Qty: 60 TABLET | Refills: 0 | Status: SHIPPED | OUTPATIENT
Start: 2017-10-16 | End: 2017-11-14 | Stop reason: SDUPTHER

## 2017-10-17 ENCOUNTER — OFFICE VISIT (OUTPATIENT)
Dept: FAMILY MEDICINE CLINIC | Facility: CLINIC | Age: 67
End: 2017-10-17

## 2017-10-17 VITALS
BODY MASS INDEX: 25.91 KG/M2 | HEIGHT: 68 IN | WEIGHT: 171 LBS | SYSTOLIC BLOOD PRESSURE: 116 MMHG | TEMPERATURE: 97.6 F | DIASTOLIC BLOOD PRESSURE: 64 MMHG | HEART RATE: 51 BPM | OXYGEN SATURATION: 96 %

## 2017-10-17 DIAGNOSIS — M54.9 MECHANICAL BACK PAIN: Primary | ICD-10-CM

## 2017-10-17 DIAGNOSIS — I25.10 CORONARY ARTERY DISEASE INVOLVING NATIVE CORONARY ARTERY OF NATIVE HEART WITHOUT ANGINA PECTORIS: ICD-10-CM

## 2017-10-17 DIAGNOSIS — I10 ESSENTIAL HYPERTENSION: ICD-10-CM

## 2017-10-17 PROCEDURE — 99214 OFFICE O/P EST MOD 30 MIN: CPT | Performed by: PHYSICIAN ASSISTANT

## 2017-10-17 NOTE — PROGRESS NOTES
"Katie Zuñiga is a 66 y.o. male.     History of Present Illness     Chronic back pain -  He complains of chronic lumbar back pain.  Pain is not related to a specific injury.  Symptoms include back pain,stiffness, and decreased ROJM.  He denies urinary incontinence or fecal incontinence.  Pain is located int he low back.  Radiation to left leg. Pain is described as varying from mild to severe burning and throbbing. Onset x years.  Symptoms exacerbated by standing and walking.  Symptoms alleviated by rest and opioid analgesics.  Associated symptoms include sexual dysfunction.  Pain causes functional limitation including general activity, mood, walking ability, work, and activities of daily living.    9/4/09 MRI lumbar spine = disc bulging L4/5 and L5/S1 with central protrusion of disc and facet arthropathy involving L3-S1    CAD - stable    Hypertension - stable    /64 (BP Location: Right arm, Patient Position: Sitting, Cuff Size: Adult)  Pulse 51  Temp 97.6 °F (36.4 °C) (Oral)   Ht 67.5\" (171.5 cm)  Wt 171 lb (77.6 kg)  SpO2 96%  BMI 26.39 kg/m2    The following portions of the patient's history were reviewed and updated as appropriate: allergies, current medications, past family history, past medical history, past social history, past surgical history and problem list.    Review of Systems   Constitutional: Negative for activity change, appetite change and fever.   HENT: Negative for ear pain, sinus pressure and sore throat.    Eyes: Negative for pain and visual disturbance.   Respiratory: Negative for cough and chest tightness.    Cardiovascular: Negative for chest pain and palpitations.   Gastrointestinal: Negative for abdominal pain, constipation, diarrhea, nausea and vomiting.   Endocrine: Negative for polydipsia and polyuria.   Genitourinary: Negative for dysuria and frequency.   Musculoskeletal: Positive for back pain. Negative for myalgias.   Skin: Negative for color change and rash. " "            Allergic/Immunologic: Negative for food allergies and immunocompromised state.   Neurological: Negative for dizziness, syncope and headaches.   Hematological: Negative for adenopathy. Does not bruise/bleed easily.   Psychiatric/Behavioral: Negative for hallucinations and suicidal ideas. The patient is not nervous/anxious.        /64 (BP Location: Right arm, Patient Position: Sitting, Cuff Size: Adult)  Pulse 51  Temp 97.6 °F (36.4 °C) (Oral)   Ht 67.5\" (171.5 cm)  Wt 171 lb (77.6 kg)  SpO2 96%  BMI 26.39 kg/m2    Objective   Physical Exam   Constitutional: He is oriented to person, place, and time. He appears well-developed and well-nourished.   HENT:   Head: Normocephalic and atraumatic.   Nose: Nose normal.   Mouth/Throat: Oropharynx is clear and moist.   Eyes: Conjunctivae and EOM are normal. Pupils are equal, round, and reactive to light.   Neck: Normal range of motion. Neck supple. No tracheal deviation present. No thyromegaly present.   Cardiovascular: Normal rate, regular rhythm, normal heart sounds and intact distal pulses.    No murmur heard.  Pulmonary/Chest: Effort normal and breath sounds normal. No respiratory distress. He has no wheezes.   Abdominal: Soft. Bowel sounds are normal. There is no tenderness. There is no guarding.   Musculoskeletal: Normal range of motion. He exhibits tenderness (lumbar musculature diffusely tender). He exhibits no edema.   Lymphadenopathy:     He has no cervical adenopathy.   Neurological: He is alert and oriented to person, place, and time.   Skin: Skin is warm and dry. No rash noted.   Psychiatric: He has a normal mood and affect. His behavior is normal.   Nursing note and vitals reviewed.      Assessment/Plan   Diagnoses and all orders for this visit:    Mechanical back pain    Coronary artery disease involving native coronary artery of native heart without angina pectoris    Essential hypertension    Prescription written for oxycodone ER " (OxyContin) 20 mg 1 tablet twice a day #60 with no refills    Danae #[id number] Reviewed and is consistent.  UDS 9/19/17 reviewed and is consistent.  Patient comfort assessment guide reviewed and updated today.    As part of the patient's treatment plan they are being prescribed a controlled substance/ substances with potential for abuse.  This patient has been made aware of the appropriate use of such medications, including potential risk of somnolence, limited ability to drive and/or work safely, and potential for overdose.  It has also been made clear these medications are for use by the patient only, without concomitant use of alcohol or other substances unless prescribed/advised by medical provider.    Patient has completed prescribing agreement detailing terms of continued prescribing of controlled substances including monitoring DANAE reports, urine drug screens, and pill counts.  The patient is aware DANAE reports are reviewed on a regular basis and scanned into the chart.    History and physical exam exhibit continued safe and appropriate use of controlled substances.

## 2017-11-02 ENCOUNTER — HOSPITAL ENCOUNTER (OUTPATIENT)
Dept: CARDIOLOGY | Facility: HOSPITAL | Age: 67
Discharge: HOME | End: 2017-11-02
Attending: INTERNAL MEDICINE | Admitting: INTERNAL MEDICINE

## 2017-11-02 DIAGNOSIS — I73.9 PAD (PERIPHERAL ARTERY DISEASE) (HCC): ICD-10-CM

## 2017-11-02 PROCEDURE — 93925 LOWER EXTREMITY STUDY: CPT | Performed by: RADIOLOGY

## 2017-11-02 PROCEDURE — 93923 UPR/LXTR ART STDY 3+ LVLS: CPT

## 2017-11-06 ENCOUNTER — TELEPHONE (OUTPATIENT)
Dept: CARDIOLOGY | Facility: CLINIC | Age: 67
End: 2017-11-06

## 2017-11-07 ENCOUNTER — LAB (OUTPATIENT)
Dept: FAMILY MEDICINE CLINIC | Facility: CLINIC | Age: 67
End: 2017-11-07

## 2017-11-07 DIAGNOSIS — I10 ESSENTIAL HYPERTENSION: ICD-10-CM

## 2017-11-07 DIAGNOSIS — E78.2 MIXED HYPERLIPIDEMIA: ICD-10-CM

## 2017-11-07 DIAGNOSIS — E11.42 TYPE 2 DIABETES MELLITUS WITH PERIPHERAL NEUROPATHY (HCC): ICD-10-CM

## 2017-11-07 DIAGNOSIS — E55.9 VITAMIN D DEFICIENCY: ICD-10-CM

## 2017-11-08 LAB
25(OH)D3+25(OH)D2 SERPL-MCNC: 16 NG/ML
ALBUMIN SERPL-MCNC: 4.8 G/DL (ref 3.4–4.8)
ALBUMIN/GLOB SERPL: 1.8 G/DL (ref 1.5–2.5)
ALP SERPL-CCNC: 77 U/L (ref 40–129)
ALT SERPL-CCNC: 48 U/L (ref 10–44)
AST SERPL-CCNC: 28 U/L (ref 10–34)
BASOPHILS # BLD AUTO: 0.01 10*3/MM3 (ref 0–0.3)
BASOPHILS NFR BLD AUTO: 0.1 % (ref 0–2)
BILIRUB SERPL-MCNC: 0.2 MG/DL (ref 0.2–1.8)
BUN SERPL-MCNC: 33 MG/DL (ref 7–21)
BUN/CREAT SERPL: 18.3 (ref 7–25)
CALCIUM SERPL-MCNC: 10.4 MG/DL (ref 7.7–10)
CHLORIDE SERPL-SCNC: 100 MMOL/L (ref 99–112)
CHOLEST SERPL-MCNC: 158 MG/DL (ref 0–200)
CO2 SERPL-SCNC: 29.7 MMOL/L (ref 24.3–31.9)
CREAT SERPL-MCNC: 1.8 MG/DL (ref 0.43–1.29)
EOSINOPHIL # BLD AUTO: 0.24 10*3/MM3 (ref 0–0.7)
EOSINOPHIL NFR BLD AUTO: 2.2 % (ref 0–7)
ERYTHROCYTE [DISTWIDTH] IN BLOOD BY AUTOMATED COUNT: 14.7 % (ref 11.5–14.5)
GFR SERPLBLD CREATININE-BSD FMLA CKD-EPI: 38 ML/MIN/1.73
GFR SERPLBLD CREATININE-BSD FMLA CKD-EPI: 46 ML/MIN/1.73
GLOBULIN SER CALC-MCNC: 2.7 GM/DL
GLUCOSE SERPL-MCNC: 244 MG/DL (ref 70–110)
HBA1C MFR BLD: 8.9 % (ref 4.5–5.7)
HCT VFR BLD AUTO: 42.6 % (ref 42–52)
HDLC SERPL-MCNC: 28 MG/DL (ref 60–100)
HGB BLD-MCNC: 14 G/DL (ref 14–18)
IMM GRANULOCYTES # BLD: 0.05 10*3/MM3 (ref 0–0.03)
IMM GRANULOCYTES NFR BLD: 0.5 % (ref 0–0.5)
LDLC SERPL CALC-MCNC: ABNORMAL MG/DL
LYMPHOCYTES # BLD AUTO: 2.11 10*3/MM3 (ref 1–3)
LYMPHOCYTES NFR BLD AUTO: 19.6 % (ref 16–46)
MCH RBC QN AUTO: 30 PG (ref 27–33)
MCHC RBC AUTO-ENTMCNC: 32.9 G/DL (ref 33–37)
MCV RBC AUTO: 91.2 FL (ref 80–94)
MONOCYTES # BLD AUTO: 1.03 10*3/MM3 (ref 0.1–0.9)
MONOCYTES NFR BLD AUTO: 9.6 % (ref 0–12)
NEUTROPHILS # BLD AUTO: 7.31 10*3/MM3 (ref 1.4–6.5)
NEUTROPHILS NFR BLD AUTO: 68 % (ref 40–75)
PLATELET # BLD AUTO: 307 10*3/MM3 (ref 130–400)
POTASSIUM SERPL-SCNC: 3.8 MMOL/L (ref 3.5–5.3)
PROT SERPL-MCNC: 7.5 G/DL (ref 6–8)
RBC # BLD AUTO: 4.67 10*6/MM3 (ref 4.7–6.1)
SODIUM SERPL-SCNC: 140 MMOL/L (ref 135–153)
TRIGL SERPL-MCNC: 702 MG/DL (ref 0–150)
VLDLC SERPL CALC-MCNC: ABNORMAL MG/DL
WBC # BLD AUTO: 10.75 10*3/MM3 (ref 4.5–12.5)

## 2017-11-14 ENCOUNTER — OFFICE VISIT (OUTPATIENT)
Dept: FAMILY MEDICINE CLINIC | Facility: CLINIC | Age: 67
End: 2017-11-14

## 2017-11-14 DIAGNOSIS — J30.2 CHRONIC SEASONAL ALLERGIC RHINITIS, UNSPECIFIED TRIGGER: ICD-10-CM

## 2017-11-14 DIAGNOSIS — I50.22 CHRONIC SYSTOLIC CONGESTIVE HEART FAILURE (HCC): ICD-10-CM

## 2017-11-14 DIAGNOSIS — C34.90 NON-SMALL CELL CARCINOMA OF LUNG (HCC): ICD-10-CM

## 2017-11-14 DIAGNOSIS — F17.200 SMOKER: ICD-10-CM

## 2017-11-14 DIAGNOSIS — Z00.00 HEALTHCARE MAINTENANCE: ICD-10-CM

## 2017-11-14 DIAGNOSIS — F41.9 CHRONIC ANXIETY: ICD-10-CM

## 2017-11-14 DIAGNOSIS — K21.9 GASTROESOPHAGEAL REFLUX DISEASE WITHOUT ESOPHAGITIS: ICD-10-CM

## 2017-11-14 DIAGNOSIS — M54.9 MECHANICAL BACK PAIN: ICD-10-CM

## 2017-11-14 DIAGNOSIS — I25.10 CORONARY ARTERY DISEASE INVOLVING NATIVE CORONARY ARTERY OF NATIVE HEART WITHOUT ANGINA PECTORIS: ICD-10-CM

## 2017-11-14 DIAGNOSIS — N52.01 ERECTILE DYSFUNCTION DUE TO ARTERIAL INSUFFICIENCY: ICD-10-CM

## 2017-11-14 DIAGNOSIS — I10 ESSENTIAL HYPERTENSION: Primary | ICD-10-CM

## 2017-11-14 DIAGNOSIS — D50.8 OTHER IRON DEFICIENCY ANEMIA: ICD-10-CM

## 2017-11-14 DIAGNOSIS — E55.9 VITAMIN D DEFICIENCY: ICD-10-CM

## 2017-11-14 DIAGNOSIS — E78.2 MIXED HYPERLIPIDEMIA: ICD-10-CM

## 2017-11-14 DIAGNOSIS — E11.42 TYPE 2 DIABETES MELLITUS WITH PERIPHERAL NEUROPATHY (HCC): ICD-10-CM

## 2017-11-14 DIAGNOSIS — J44.1 CHRONIC OBSTRUCTIVE PULMONARY DISEASE WITH ACUTE EXACERBATION (HCC): ICD-10-CM

## 2017-11-14 DIAGNOSIS — K57.30 DIVERTICULOSIS OF LARGE INTESTINE WITHOUT HEMORRHAGE: ICD-10-CM

## 2017-11-14 PROCEDURE — 99214 OFFICE O/P EST MOD 30 MIN: CPT | Performed by: GENERAL PRACTICE

## 2017-11-14 RX ORDER — OXYCODONE HYDROCHLORIDE 10 MG/1
10 TABLET ORAL 4 TIMES DAILY
Qty: 120 TABLET | Refills: 0 | Status: SHIPPED | OUTPATIENT
Start: 2017-11-14 | End: 2017-12-08 | Stop reason: SDUPTHER

## 2017-11-14 RX ORDER — OXYCODONE HCL 20 MG/1
20 TABLET, FILM COATED, EXTENDED RELEASE ORAL EVERY 12 HOURS
Qty: 60 TABLET | Refills: 0 | Status: SHIPPED | OUTPATIENT
Start: 2017-11-14 | End: 2017-11-14

## 2017-11-14 NOTE — PROGRESS NOTES
Katie Zuñiga is a 66 y.o. male.     History of Present Illness     Congestive Heart Failure  Patient has a history of congestive heart failure with an estimated EF of 26% on an echocardiogram performed on 10/3/17. 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 There was no thrombus noted on his most recent echocardiogram. Patient's current complaints are dyspnea with exertion and fatigue. He denies dyspnea at rest, orthopnea, paroxysmal nocturnal dyspnea, chest pain and palpitations. Current medications include entresto, carvedilol,  furosemide and coumadin.  He states he is compliant most of the time with his medications. He states he is noncompliant most of the time with his diet. He is currently followed by Dr Marcus. Most recent Hgb 14. Cr up to 1.8    Coronary artery disease  He has a history of CABG and CHF. Previous diagnostic testing includes: cardiac catheterization Recent history: taking medications as instructed, no medication side effects noted, no TIA's, no chest pain on exertion, notes stable dyspnea on exertion, no change and no swelling of ankles. Patient's symptoms have been unchanged. Medication side effects include: none.    Diabetes  Current symptoms include none. Patient denies paresthesia of the feet, visual disturbances, polydipsia, polyuria, hypoglycemia and foot ulcerations. Evaluation to date has been: hemoglobin A1C. Home sugars: BGs are running  consistent with Hgb A1C. Current treatments: xultophy - 50 units daily. He essentially refuses to mealtime insulin. Last dilated eye exam more then one year ago. Most recent hemoglobin A1c   Lab Results   Component Value Date    HGBA1C 8.90 (H) 11/07/2017       Dyslipidemia  Compliance with treatment has been poor. The patient exercises rarely. He is currently being prescribed the following medication for his dyslipidemia - rosuvastatin.     Chronic Low Back Pain  He complains of chronic  lumbar back pain.  Pain is not related to a specific injury.  Symptoms include back pain,stiffness, and decreased ROJM.  He denies urinary incontinence or fecal incontinence.  Pain is located int he low back.  Radiation to left leg. Pain is described as varying from mild to severe burning and throbbing. Onset x years.  Symptoms exacerbated by standing and walking.  Symptoms alleviated by rest and opioid analgesics.  Associated symptoms include sexual dysfunction.  Pain causes functional limitation including general activity, mood, walking ability, work, and activities of daily living. Previous MRI lumbar spine was reported as showing disc bulging L4/5 and L5/S1 with central protrusion of disc and facet arthropathy involving L3-S1. Currently on oxycodone er 20 bid but the co-pay is quite expensive - $55 per month    Labs  Most recent vitamin D 16. Prescribed high dose supplementation but has yet to start on it    The following portions of the patient's history were reviewed and updated as appropriate: allergies, current medications, past medical history, past social history and problem list.    Review of Systems   Constitutional: Positive for fatigue. Negative for appetite change, chills, fever and unexpected weight change.   HENT: Negative for congestion, ear pain, rhinorrhea, sneezing, sore throat and voice change.    Eyes: Negative for visual disturbance.   Respiratory: Positive for cough, shortness of breath and wheezing.    Cardiovascular: Negative for chest pain, palpitations and leg swelling.   Gastrointestinal: Negative for abdominal pain, blood in stool, constipation, diarrhea, nausea and vomiting.   Endocrine: Negative for polydipsia and polyuria.   Genitourinary: Negative for difficulty urinating, dysuria, frequency, hematuria and urgency.   Musculoskeletal: Positive for arthralgias and back pain. Negative for joint swelling, myalgias and neck pain.   Skin: Negative for color change.   Neurological:  Negative for tremors, weakness, numbness and headaches.   Psychiatric/Behavioral: Negative for dysphoric mood, sleep disturbance and suicidal ideas. The patient is not nervous/anxious.      Objective   Physical Exam   Constitutional: He is oriented to person, place, and time. He appears well-developed and well-nourished. He is cooperative. He does not have a sickly appearance. No distress.   In fair spirits.  No apparent distress.  No pallor, cyanosis, diaphoresis, or jaundice.   HENT:   Head: Atraumatic.   Right Ear: Tympanic membrane, external ear and ear canal normal.   Left Ear: Tympanic membrane, external ear and ear canal normal.   Mouth/Throat: Oropharynx is clear and moist.   Eyes: EOM are normal. Pupils are equal, round, and reactive to light. No scleral icterus.   Neck: No JVD present. Carotid bruit is not present. No tracheal deviation present. No thyromegaly present.   Cardiovascular: Normal rate, regular rhythm, S1 normal, S2 normal, normal heart sounds and intact distal pulses.  Exam reveals no gallop.    No murmur heard.  Pulmonary/Chest: No accessory muscle usage. No respiratory distress. He has decreased breath sounds in the right lower field and the left lower field. He has wheezes (diffuse-primarily with forced expiration). He has no rales.   Mild pulmonary hyperinflation   Abdominal: Soft. Normal aorta and bowel sounds are normal. He exhibits no abdominal bruit and no mass. There is no hepatosplenomegaly. There is no tenderness. No hernia.   Musculoskeletal: He exhibits no deformity.   Negative straight leg raise. No peripheral joint redness or warmth   Lymphadenopathy:        Head (right side): No submandibular adenopathy present.        Head (left side): No submandibular adenopathy present.     He has no cervical adenopathy.   Neurological: He is alert and oriented to person, place, and time. He has normal strength and normal reflexes. He displays normal reflexes. A sensory deficit (decreased  vibration sense both feet) is present. No cranial nerve deficit. He exhibits normal muscle tone. Coordination normal.   Skin: Skin is warm and dry. No rash noted. He is not diaphoretic. No pallor. Nails show no clubbing.   Psychiatric: He has a normal mood and affect. His behavior is normal.     Assessment/Plan   Problems Addressed this Visit        Cardiovascular and Mediastinum    Essential hypertension   Hypertension: at goal. Evidence of target organ damage: coronary artery disease, prior coronary revascularization, heart failure and chronic kidney disease.  Encouraged to continue to work on diet and exercise plan.   Continue current medication    Mixed hyperlipidemia  As above.   Continue current medication.    CAD (coronary artery disease)  Coronary artery disease is stable.  Reminded regarding the importance of lifestyle modification with an emphasis on smoking cessation    Chronic systolic congestive heart failure  Congestive heart failure due to coronary artery disease (CAD) and systolic dysfunction.  Heart failure is improving with treatment.  Continue current medication   Follow up with Dr Marcus. His office will be asked to have him review patients most recent Cr       Respiratory    Chronic obstructive pulmonary disease   COPD is stable.  Encouraged to remain as active as symptoms allow for    Chronic seasonal allergic rhinitis    Reminded regarding allergen avoidance.  Continue current medication       Digestive    Vitamin D deficiency  Encouraged to start on high dose supplementation  Will continue to monitor    Gastroesophageal reflux disease without esophagitis    Diverticulosis of large intestine without hemorrhage       Endocrine    Type 2 diabetes mellitus with peripheral neuropathy  Diabetes mellitus Type II, under better control.   Encouraged to continue to pursue ADA diet  Encouraged aerobic exercise.  Reminded to get yearly retinal exam.       Nervous and Auditory    Mechanical back  pain  Reminded regarding symptomatic treatment.   Will change oxycodone to the IR formulation at the same daily dose  Follow up with Megan PAGE    Relevant Medications    oxyCODONE (ROXICODONE) 10 MG tablet       Hematopoietic and Hemostatic    Iron deficiency anemia  Corrected  Will continue to monitor       Other    Chronic anxiety    Vasculogenic erectile dysfunction    Smoker    Healthcare maintenance  Patient has already received a flu shot this fall

## 2017-11-15 VITALS
HEART RATE: 83 BPM | WEIGHT: 170 LBS | RESPIRATION RATE: 12 BRPM | BODY MASS INDEX: 25.76 KG/M2 | OXYGEN SATURATION: 97 % | TEMPERATURE: 98.4 F | DIASTOLIC BLOOD PRESSURE: 60 MMHG | SYSTOLIC BLOOD PRESSURE: 115 MMHG | HEIGHT: 68 IN

## 2017-11-15 PROBLEM — M77.01 MEDIAL EPICONDYLITIS OF RIGHT ELBOW: Status: RESOLVED | Noted: 2017-08-19 | Resolved: 2017-11-15

## 2017-11-21 ENCOUNTER — TELEPHONE (OUTPATIENT)
Dept: FAMILY MEDICINE CLINIC | Facility: CLINIC | Age: 67
End: 2017-11-21

## 2017-11-21 ENCOUNTER — TELEPHONE (OUTPATIENT)
Dept: CARDIOLOGY | Facility: CLINIC | Age: 67
End: 2017-11-21

## 2017-11-21 DIAGNOSIS — E78.2 MIXED HYPERLIPIDEMIA: Primary | ICD-10-CM

## 2017-11-21 NOTE — TELEPHONE ENCOUNTER
I messaged Dr. Marcus about the following...     ----- Message from Edy Kline MD sent at 11/14/2017  3:44 PM EST -----  Please ask dr fajardo office to make fani he reviews mr varghese' most recent labs (increased Cr)

## 2017-11-21 NOTE — TELEPHONE ENCOUNTER
V/O patient you need to stop taking your lasix and get a BMP in 1 week, you can go to your PCP office if they will draw it and fax it to us, if not go to the hospital.

## 2017-11-22 ENCOUNTER — TELEPHONE (OUTPATIENT)
Dept: FAMILY MEDICINE CLINIC | Facility: CLINIC | Age: 67
End: 2017-11-22

## 2017-11-22 RX ORDER — WARFARIN SODIUM 1 MG/1
1 TABLET ORAL
Qty: 30 TABLET | Refills: 5 | Status: SHIPPED | OUTPATIENT
Start: 2017-11-22 | End: 2018-06-12 | Stop reason: SDUPTHER

## 2017-11-22 RX ORDER — WARFARIN SODIUM 4 MG/1
4 TABLET ORAL
Qty: 30 TABLET | Refills: 5 | Status: SHIPPED | OUTPATIENT
Start: 2017-11-22 | End: 2018-06-12 | Stop reason: SDUPTHER

## 2017-11-22 NOTE — TELEPHONE ENCOUNTER
Made pt's wife Karol's aware of the following...     ----- Message from Edy Kline MD sent at 11/21/2017  3:32 PM EST -----  Please have gagandeep stop his metformin    ----- Message -----     From: Whitney Bartlett MA     Sent: 11/21/2017  12:41 PM       To: Edy Kline MD        ----- Message -----     From: Kamari Marcus MD     Sent: 11/21/2017  12:17 PM       To: Whitney Bartlett MA    Patient will be notified to DC Lasix and check BMP in 1 week.  I believe metformin to Dr. Kline.  It needs to be stopped also.    ----- Message -----     From: Whitney Bartlett MA     Sent: 11/21/2017  12:07 PM       To: Kamari Marcus MD    [facility], I am with Dr. Kline's office. Dr. Kline wanted me to check with you to make sure you have saw Gagandeep's last labs, he had an increase in CR.       Thanks

## 2017-11-26 ENCOUNTER — RESULTS ENCOUNTER (OUTPATIENT)
Dept: CARDIOLOGY | Facility: CLINIC | Age: 67
End: 2017-11-26

## 2017-11-26 DIAGNOSIS — E78.2 MIXED HYPERLIPIDEMIA: ICD-10-CM

## 2017-11-30 ENCOUNTER — LAB (OUTPATIENT)
Dept: FAMILY MEDICINE CLINIC | Facility: CLINIC | Age: 67
End: 2017-11-30

## 2017-11-30 DIAGNOSIS — R79.9 ABNORMAL BLOOD CHEMISTRY: Primary | ICD-10-CM

## 2017-11-30 LAB
BUN SERPL-MCNC: 17 MG/DL (ref 7–21)
BUN/CREAT SERPL: 13.5 (ref 7–25)
CALCIUM SERPL-MCNC: 10 MG/DL (ref 7.7–10)
CHLORIDE SERPL-SCNC: 105 MMOL/L (ref 99–112)
CO2 SERPL-SCNC: 26.7 MMOL/L (ref 24.3–31.9)
CREAT SERPL-MCNC: 1.26 MG/DL (ref 0.43–1.29)
GFR SERPLBLD CREATININE-BSD FMLA CKD-EPI: 57 ML/MIN/1.73
GFR SERPLBLD CREATININE-BSD FMLA CKD-EPI: 70 ML/MIN/1.73
GLUCOSE SERPL-MCNC: 413 MG/DL (ref 70–110)
POTASSIUM SERPL-SCNC: 4.8 MMOL/L (ref 3.5–5.3)
SODIUM SERPL-SCNC: 134 MMOL/L (ref 135–153)

## 2017-11-30 PROCEDURE — 36415 COLL VENOUS BLD VENIPUNCTURE: CPT | Performed by: GENERAL PRACTICE

## 2017-12-01 ENCOUNTER — TELEPHONE (OUTPATIENT)
Dept: CARDIOLOGY | Facility: CLINIC | Age: 67
End: 2017-12-01

## 2017-12-01 NOTE — TELEPHONE ENCOUNTER
Called pt & told him     MD Leigh Lanza MA                   Sugar is high kidney functions are better.  He can restart his metformin if it is okay with Dr. Kauffman

## 2017-12-08 DIAGNOSIS — M54.9 MECHANICAL BACK PAIN: ICD-10-CM

## 2017-12-08 RX ORDER — OXYCODONE HYDROCHLORIDE 10 MG/1
10 TABLET ORAL 4 TIMES DAILY
Qty: 120 TABLET | Refills: 0 | Status: SHIPPED | OUTPATIENT
Start: 2017-12-08 | End: 2018-01-11 | Stop reason: SDUPTHER

## 2017-12-11 ENCOUNTER — OFFICE VISIT (OUTPATIENT)
Dept: FAMILY MEDICINE CLINIC | Facility: CLINIC | Age: 67
End: 2017-12-11

## 2017-12-11 VITALS
TEMPERATURE: 98 F | DIASTOLIC BLOOD PRESSURE: 72 MMHG | SYSTOLIC BLOOD PRESSURE: 126 MMHG | BODY MASS INDEX: 25.16 KG/M2 | OXYGEN SATURATION: 96 % | HEIGHT: 68 IN | WEIGHT: 166 LBS | HEART RATE: 84 BPM

## 2017-12-11 DIAGNOSIS — I25.10 CORONARY ARTERY DISEASE INVOLVING NATIVE CORONARY ARTERY OF NATIVE HEART WITHOUT ANGINA PECTORIS: ICD-10-CM

## 2017-12-11 DIAGNOSIS — Z79.899 LONG TERM USE OF DRUG: Primary | ICD-10-CM

## 2017-12-11 DIAGNOSIS — I10 ESSENTIAL HYPERTENSION: ICD-10-CM

## 2017-12-11 DIAGNOSIS — M54.9 MECHANICAL BACK PAIN: ICD-10-CM

## 2017-12-11 PROCEDURE — 99214 OFFICE O/P EST MOD 30 MIN: CPT | Performed by: PHYSICIAN ASSISTANT

## 2017-12-12 NOTE — PROGRESS NOTES
"Katie Zuñiga is a 66 y.o. male.     History of Present Illness     Chronic back pain -  He complains of chronic lumbar back pain.  Pain is not related to a specific injury.  Symptoms include back pain,stiffness, and decreased ROJM.  He denies urinary incontinence or fecal incontinence.  Pain is located int he low back.  Radiation to left leg. Pain is described as varying from mild to severe burning and throbbing. Onset x years.  Symptoms exacerbated by standing and walking.  Symptoms alleviated by rest and opioid analgesics.  Associated symptoms include sexual dysfunction.  Pain causes functional limitation including general activity, mood, walking ability, work, and activities of daily living.    9/4/09 MRI lumbar spine = disc bulging L4/5 and L5/S1 with central protrusion of disc and facet arthropathy involving L3-S1    CAD - stable    Hypertension - stable    /72 (BP Location: Left arm, Patient Position: Sitting, Cuff Size: Adult)  Pulse 84  Temp 98 °F (36.7 °C) (Oral)   Ht 171.5 cm (67.52\")  Wt 75.3 kg (166 lb)  SpO2 96%  BMI 25.6 kg/m2    The following portions of the patient's history were reviewed and updated as appropriate: allergies, current medications, past family history, past medical history, past social history, past surgical history and problem list.    Review of Systems   Constitutional: Negative for activity change, appetite change and fever.   HENT: Negative for ear pain, sinus pressure and sore throat.    Eyes: Negative for pain and visual disturbance.   Respiratory: Negative for cough and chest tightness.    Cardiovascular: Negative for chest pain and palpitations.   Gastrointestinal: Negative for abdominal pain, constipation, diarrhea, nausea and vomiting.   Endocrine: Negative for polydipsia and polyuria.   Genitourinary: Negative for dysuria and frequency.   Musculoskeletal: Positive for back pain. Negative for myalgias.   Skin: Negative for color change and rash.     " "        Allergic/Immunologic: Negative for food allergies and immunocompromised state.   Neurological: Negative for dizziness, syncope and headaches.   Hematological: Negative for adenopathy. Does not bruise/bleed easily.   Psychiatric/Behavioral: Negative for hallucinations and suicidal ideas. The patient is not nervous/anxious.        /72 (BP Location: Left arm, Patient Position: Sitting, Cuff Size: Adult)  Pulse 84  Temp 98 °F (36.7 °C) (Oral)   Ht 171.5 cm (67.52\")  Wt 75.3 kg (166 lb)  SpO2 96%  BMI 25.6 kg/m2    Objective   Physical Exam   Constitutional: He is oriented to person, place, and time. He appears well-developed and well-nourished.   HENT:   Head: Normocephalic and atraumatic.   Nose: Nose normal.   Mouth/Throat: Oropharynx is clear and moist.   Eyes: Conjunctivae and EOM are normal. Pupils are equal, round, and reactive to light.   Neck: Normal range of motion. Neck supple. No tracheal deviation present. No thyromegaly present.   Cardiovascular: Normal rate, regular rhythm, normal heart sounds and intact distal pulses.    No murmur heard.  Pulmonary/Chest: Effort normal and breath sounds normal. No respiratory distress. He has no wheezes.   Abdominal: Soft. Bowel sounds are normal. There is no tenderness. There is no guarding.   Musculoskeletal: Normal range of motion. He exhibits tenderness (lumbar musculature diffusely tender). He exhibits no edema.   Lymphadenopathy:     He has no cervical adenopathy.   Neurological: He is alert and oriented to person, place, and time.   Skin: Skin is warm and dry. No rash noted.   Psychiatric: He has a normal mood and affect. His behavior is normal.   Nursing note and vitals reviewed.      Assessment/Plan   Diagnoses and all orders for this visit:    Long term use of drug  -     Urine Drug Screen - Urine, Clean Catch    Mechanical back pain    Coronary artery disease involving native coronary artery of native heart without angina " pectoris    Essential hypertension    Prescription written for oxycodone ER (OxyContin) 20 mg 1 tablet twice a day #60 with no refills    Danae #[id number] Reviewed and is consistent.  UDS 9/19/17 reviewed and is consistent.  Patient comfort assessment guide reviewed and updated today.    As part of the patient's treatment plan they are being prescribed a controlled substance/ substances with potential for abuse.  This patient has been made aware of the appropriate use of such medications, including potential risk of somnolence, limited ability to drive and/or work safely, and potential for overdose.  It has also been made clear these medications are for use by the patient only, without concomitant use of alcohol or other substances unless prescribed/advised by medical provider.    Patient has completed prescribing agreement detailing terms of continued prescribing of controlled substances including monitoring DANAE reports, urine drug screens, and pill counts.  The patient is aware DANAE reports are reviewed on a regular basis and scanned into the chart.    History and physical exam exhibit continued safe and appropriate use of controlled substances.

## 2018-01-11 DIAGNOSIS — M54.9 MECHANICAL BACK PAIN: ICD-10-CM

## 2018-01-11 RX ORDER — OXYCODONE HYDROCHLORIDE 10 MG/1
10 TABLET ORAL 4 TIMES DAILY
Qty: 120 TABLET | Refills: 0 | Status: SHIPPED | OUTPATIENT
Start: 2018-01-11 | End: 2018-02-12 | Stop reason: SDUPTHER

## 2018-01-30 ENCOUNTER — LAB (OUTPATIENT)
Dept: FAMILY MEDICINE CLINIC | Facility: CLINIC | Age: 68
End: 2018-01-30

## 2018-01-30 ENCOUNTER — RESULTS ENCOUNTER (OUTPATIENT)
Dept: FAMILY MEDICINE CLINIC | Facility: CLINIC | Age: 68
End: 2018-01-30

## 2018-01-30 DIAGNOSIS — I25.10 CORONARY ARTERY DISEASE INVOLVING NATIVE CORONARY ARTERY OF NATIVE HEART WITHOUT ANGINA PECTORIS: ICD-10-CM

## 2018-01-30 DIAGNOSIS — E78.2 MIXED HYPERLIPIDEMIA: ICD-10-CM

## 2018-01-30 DIAGNOSIS — I10 ESSENTIAL HYPERTENSION: Primary | ICD-10-CM

## 2018-01-30 DIAGNOSIS — E11.42 TYPE 2 DIABETES MELLITUS WITH PERIPHERAL NEUROPATHY (HCC): ICD-10-CM

## 2018-01-30 DIAGNOSIS — M54.9 MECHANICAL BACK PAIN: ICD-10-CM

## 2018-01-30 DIAGNOSIS — F41.9 CHRONIC ANXIETY: ICD-10-CM

## 2018-01-30 DIAGNOSIS — E55.9 VITAMIN D DEFICIENCY: ICD-10-CM

## 2018-01-30 DIAGNOSIS — Z51.81 ENCOUNTER FOR THERAPEUTIC DRUG LEVEL MONITORING: ICD-10-CM

## 2018-01-30 DIAGNOSIS — I50.22 CHRONIC SYSTOLIC CONGESTIVE HEART FAILURE (HCC): ICD-10-CM

## 2018-01-30 DIAGNOSIS — F41.9 CHRONIC ANXIETY: Primary | ICD-10-CM

## 2018-01-30 DIAGNOSIS — I10 ESSENTIAL HYPERTENSION: ICD-10-CM

## 2018-01-30 DIAGNOSIS — D50.8 OTHER IRON DEFICIENCY ANEMIA: ICD-10-CM

## 2018-01-31 LAB
25(OH)D3+25(OH)D2 SERPL-MCNC: 17 NG/ML
ALBUMIN SERPL-MCNC: 4.8 G/DL (ref 3.4–4.8)
ALBUMIN/GLOB SERPL: 2 G/DL (ref 1.5–2.5)
ALP SERPL-CCNC: 83 U/L (ref 40–129)
ALT SERPL-CCNC: 150 U/L (ref 10–44)
AST SERPL-CCNC: 33 U/L (ref 10–34)
BILIRUB SERPL-MCNC: 0.2 MG/DL (ref 0.2–1.8)
BUN SERPL-MCNC: 24 MG/DL (ref 7–21)
BUN/CREAT SERPL: 19.5 (ref 7–25)
CALCIUM SERPL-MCNC: 10.3 MG/DL (ref 7.7–10)
CHLORIDE SERPL-SCNC: 106 MMOL/L (ref 99–112)
CHOLEST SERPL-MCNC: 109 MG/DL (ref 0–200)
CO2 SERPL-SCNC: 25.1 MMOL/L (ref 24.3–31.9)
CREAT SERPL-MCNC: 1.23 MG/DL (ref 0.43–1.29)
FERRITIN SERPL-MCNC: 28 NG/ML (ref 21.9–321.7)
GFR SERPLBLD CREATININE-BSD FMLA CKD-EPI: 59 ML/MIN/1.73
GFR SERPLBLD CREATININE-BSD FMLA CKD-EPI: 71 ML/MIN/1.73
GLOBULIN SER CALC-MCNC: 2.4 GM/DL
GLUCOSE SERPL-MCNC: 228 MG/DL (ref 70–110)
HBA1C MFR BLD: 9.1 % (ref 4.5–5.7)
HDLC SERPL-MCNC: 36 MG/DL (ref 60–100)
IRON SERPL-MCNC: 39 MCG/DL (ref 53–167)
LDLC SERPL CALC-MCNC: 17 MG/DL (ref 0–100)
POTASSIUM SERPL-SCNC: 4.9 MMOL/L (ref 3.5–5.3)
PROT SERPL-MCNC: 7.2 G/DL (ref 6–8)
SODIUM SERPL-SCNC: 134 MMOL/L (ref 135–153)
TRANSFERRIN SERPL-MCNC: 282 MG/DL (ref 200–370)
TRIGL SERPL-MCNC: 278 MG/DL (ref 0–150)
TSH SERPL DL<=0.005 MIU/L-ACNC: 1.58 MIU/ML (ref 0.55–4.78)
VIT B12 SERPL-MCNC: 738 PG/ML (ref 211–911)
VLDLC SERPL CALC-MCNC: 55.6 MG/DL

## 2018-02-01 ENCOUNTER — OFFICE VISIT (OUTPATIENT)
Dept: FAMILY MEDICINE CLINIC | Facility: CLINIC | Age: 68
End: 2018-02-01

## 2018-02-01 VITALS
DIASTOLIC BLOOD PRESSURE: 60 MMHG | WEIGHT: 164 LBS | OXYGEN SATURATION: 98 % | RESPIRATION RATE: 12 BRPM | BODY MASS INDEX: 24.86 KG/M2 | TEMPERATURE: 98 F | SYSTOLIC BLOOD PRESSURE: 100 MMHG | HEART RATE: 89 BPM | HEIGHT: 68 IN

## 2018-02-01 DIAGNOSIS — J44.1 CHRONIC OBSTRUCTIVE PULMONARY DISEASE WITH ACUTE EXACERBATION (HCC): ICD-10-CM

## 2018-02-01 DIAGNOSIS — I10 ESSENTIAL HYPERTENSION: Primary | ICD-10-CM

## 2018-02-01 DIAGNOSIS — I25.10 CORONARY ARTERY DISEASE INVOLVING NATIVE CORONARY ARTERY OF NATIVE HEART WITHOUT ANGINA PECTORIS: ICD-10-CM

## 2018-02-01 DIAGNOSIS — E11.42 TYPE 2 DIABETES MELLITUS WITH PERIPHERAL NEUROPATHY (HCC): ICD-10-CM

## 2018-02-01 DIAGNOSIS — R63.4 WEIGHT LOSS: ICD-10-CM

## 2018-02-01 DIAGNOSIS — J30.2 CHRONIC SEASONAL ALLERGIC RHINITIS, UNSPECIFIED TRIGGER: ICD-10-CM

## 2018-02-01 DIAGNOSIS — I50.22 CHRONIC SYSTOLIC CONGESTIVE HEART FAILURE (HCC): ICD-10-CM

## 2018-02-01 DIAGNOSIS — S09.90XD INJURY OF HEAD, SUBSEQUENT ENCOUNTER: ICD-10-CM

## 2018-02-01 DIAGNOSIS — D50.8 OTHER IRON DEFICIENCY ANEMIA: ICD-10-CM

## 2018-02-01 DIAGNOSIS — E78.2 MIXED HYPERLIPIDEMIA: ICD-10-CM

## 2018-02-01 DIAGNOSIS — F17.200 SMOKER: ICD-10-CM

## 2018-02-01 PROBLEM — S09.90XA HEAD INJURY: Status: ACTIVE | Noted: 2018-02-01

## 2018-02-01 PROCEDURE — 99214 OFFICE O/P EST MOD 30 MIN: CPT | Performed by: GENERAL PRACTICE

## 2018-02-01 RX ORDER — BLOOD-GLUCOSE METER
EACH MISCELLANEOUS
Refills: 2 | COMMUNITY
Start: 2018-01-11

## 2018-02-01 NOTE — PROGRESS NOTES
Subjective   Adria Zuñiga is a 67 y.o. male.     History of Present Illness     Fatigue  Returns with an approximate one-month history of increased fatigue. This was preceded by a fall at home.  States he lost his balance while trying to get his foot into a pain and leg and fell against the flat side of his bedroom door.  He does not recall hitting his head but has had more frequent generalized headaches since.  There is no history of any changes in his vision, strength, or sensation nor in his gait or coordination.  He denies any nausea or vomiting and his had no fever, chills, or night sweats.  He has lost about 15 pounds over the last year    COPD  The patient reports that his SOB, cough and wheezing have been worse as of late. He states that these symptoms occur at any time during the day or night. He reports that his symptoms become worse with activity. His symptoms are reduced with rest and with inhaled inhaled medication. The patient states he also has nasal congestion. He is following the treatment plan, including medications as directed. He currently smokes approximately 1 packs per day.    Congestive Heart Failure  Patient has a history of congestive heart failure with an estimated EF of 26% on an echocardiogram performed on 10/3/17. 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 There was no thrombus noted on his most recent echocardiogram. Patient's current complaints are dyspnea with exertion and fatigue. He denies dyspnea at rest, orthopnea, paroxysmal nocturnal dyspnea, chest pain and palpitations. Current medications include entresto, carvedilol,  furosemide and coumadin.  He states he is compliant most of the time with his medications. He states he is noncompliant most of the time with his diet. He is currently followed by Dr Marcus and will undergo a reassessment next week    Coronary artery disease  He has a history of CABG and CHF. Previous diagnostic  testing includes: cardiac catheterization Recent history: taking medications as instructed, no medication side effects noted, no TIA's, no chest pain on exertion, notes stable dyspnea on exertion, no change and no swelling of ankles. Patient's symptoms have been unchanged. Medication side effects include: none.    Diabetes  Current symptoms include none. Patient denies paresthesia of the feet, visual disturbances, polydipsia, polyuria, hypoglycemia and foot ulcerations. Evaluation to date has been: hemoglobin A1C. Home sugars: BGs are running  consistent with Hgb A1C. Current treatments: xultophy - 50 units daily. He essentially refuses to mealtime insulin. Last dilated eye exam more then one year ago. Most recent hemoglobin A1c   Lab Results   Component Value Date    HGBA1C 9.10 (H) 01/30/2018       Dyslipidemia  Compliance with treatment has been poor. The patient exercises rarely. He is currently being prescribed the following medication for his dyslipidemia - rosuvastatin.  Lab Results   Component Value Date    CHOL 305 (H) 04/11/2017    CHLPL 109 01/30/2018    TRIG 278 (H) 01/30/2018    HDL 36 (L) 01/30/2018    LDLCALC  04/11/2017      Comment:      Unable to calculate    LDL 17 01/30/2018        Chronic Low Back Pain  He complains of chronic lumbar back pain.  Pain is not related to a specific injury.  Symptoms include back pain,stiffness, and decreased ROJM.  He denies urinary incontinence or fecal incontinence.  Pain is located int he low back.  Radiation to left leg. Pain is described as varying from mild to severe burning and throbbing. Onset x years.  Symptoms exacerbated by standing and walking.  Symptoms alleviated by rest and opioid analgesics.  Associated symptoms include sexual dysfunction.  Pain causes functional limitation including general activity, mood, walking ability, work, and activities of daily living. Previous MRI lumbar spine was reported as showing disc bulging L4/5 and L5/S1 with  central protrusion of disc and facet arthropathy involving L3-S1. Currently on oxycodone er 20 bid but the co-pay is quite expensive - $55 per month    The following portions of the patient's history were reviewed and updated as appropriate: allergies, current medications, past medical history, past social history and problem list.    Review of Systems   Constitutional: Positive for fatigue. Negative for appetite change, chills, fever and unexpected weight change.   HENT: Positive for congestion and rhinorrhea. Negative for ear pain, sneezing, sore throat and voice change.    Eyes: Negative for visual disturbance.   Respiratory: Positive for cough, shortness of breath and wheezing.    Cardiovascular: Negative for chest pain, palpitations and leg swelling.   Gastrointestinal: Negative for abdominal pain, blood in stool, constipation, diarrhea, nausea and vomiting.   Endocrine: Negative for polydipsia and polyuria.   Genitourinary: Negative for difficulty urinating, dysuria, frequency, hematuria and urgency.   Musculoskeletal: Positive for arthralgias and back pain. Negative for joint swelling, myalgias and neck pain.   Skin: Negative for color change.   Neurological: Positive for headaches. Negative for tremors, weakness and numbness.   Psychiatric/Behavioral: Negative for dysphoric mood, sleep disturbance and suicidal ideas. The patient is not nervous/anxious.      Objective   Physical Exam   Constitutional: He is oriented to person, place, and time. He appears well-developed and well-nourished. He is cooperative. He does not have a sickly appearance. No distress.   Alert and in fair spirits.  Appeared older than stated age and chronically ill.  No apparent distress.  No pallor, cyanosis, diaphoresis, or jaundice.   HENT:   Head: Atraumatic.   Right Ear: Tympanic membrane, external ear and ear canal normal.   Left Ear: Tympanic membrane, external ear and ear canal normal.   Mouth/Throat: Oropharynx is clear and  moist.   Eyes: EOM are normal. Pupils are equal, round, and reactive to light. No scleral icterus.   Neck: No JVD present. Carotid bruit is not present. No tracheal deviation present. No thyromegaly present.   Cardiovascular: Normal rate, regular rhythm, S1 normal, S2 normal, normal heart sounds and intact distal pulses.  Exam reveals no gallop.    No murmur heard.  Pulmonary/Chest: No accessory muscle usage. No respiratory distress. He has decreased breath sounds in the right lower field and the left lower field. He has wheezes (diffuse-primarily with forced expiration). He has no rales.   Mild pulmonary hyperinflation   Abdominal: Soft. Normal aorta and bowel sounds are normal. He exhibits no abdominal bruit and no mass. There is no hepatosplenomegaly. There is no tenderness. No hernia.   Musculoskeletal: He exhibits no deformity.   No peripheral joint redness or warmth   Lymphadenopathy:        Head (right side): No submandibular adenopathy present.        Head (left side): No submandibular adenopathy present.     He has no cervical adenopathy.   Neurological: He is alert and oriented to person, place, and time. He has normal strength and normal reflexes. He displays normal reflexes. A sensory deficit (decreased vibration sense both feet) is present. No cranial nerve deficit. He exhibits normal muscle tone. Coordination normal.   Skin: Skin is warm and dry. No rash noted. He is not diaphoretic. No pallor. Nails show no clubbing.   Psychiatric: He has a normal mood and affect. His behavior is normal.     Assessment/Plan   Problems Addressed this Visit        Cardiovascular and Mediastinum    Essential hypertension  Encouraged to continue to work on his diet and exercise plan.  Continue current medication    Relevant Orders    Comprehensive Metabolic Panel    Mixed hyperlipidemia  As above.   Continue current medication.    CAD (coronary artery disease)  Reminded regarding risk factor modification with an emphasis  on tobacco cessation.  Follow up with cardiology     Chronic systolic congestive heart failure  As above.        Respiratory    Chronic obstructive pulmonary disease   With increased symptoms and history of previous abnormal sputum cytology  We'll arrange an updated CT of the chest     Relevant Orders    CT Chest With Contrast    Chronic seasonal allergic rhinitis       Endocrine    Type 2 diabetes mellitus with peripheral neuropathy  Diabetes mellitus Type II, under inadequate control.   Encouraged to continue to pursue ADA diet  Encouraged aerobic exercise.  Reminded to get yearly retinal exam.    Relevant Medications    metFORMIN (GLUCOPHAGE) 1000 MG tablet       Hematopoietic and Hemostatic    Iron deficiency anemia    Relevant Orders    CBC & Differential       Other    Smoker    Relevant Orders    CT Chest With Contrast    Head injury  Acute on chronic headaches with increased fatigue since a fall 1 month ago  Will also do a CT of the head     Relevant Orders    CT Head With & Without Contrast    Weight loss    Relevant Orders    CT Chest With Contrast    CT Head With & Without Contrast

## 2018-02-06 ENCOUNTER — OFFICE VISIT (OUTPATIENT)
Dept: CARDIOLOGY | Facility: CLINIC | Age: 68
End: 2018-02-06

## 2018-02-06 VITALS
HEIGHT: 68 IN | DIASTOLIC BLOOD PRESSURE: 70 MMHG | OXYGEN SATURATION: 98 % | WEIGHT: 168 LBS | HEART RATE: 79 BPM | RESPIRATION RATE: 20 BRPM | BODY MASS INDEX: 25.46 KG/M2 | SYSTOLIC BLOOD PRESSURE: 110 MMHG

## 2018-02-06 DIAGNOSIS — R00.2 PALPITATIONS: ICD-10-CM

## 2018-02-06 DIAGNOSIS — E78.2 MIXED HYPERLIPIDEMIA: ICD-10-CM

## 2018-02-06 DIAGNOSIS — I50.22 CHRONIC SYSTOLIC CONGESTIVE HEART FAILURE (HCC): ICD-10-CM

## 2018-02-06 DIAGNOSIS — I25.5 ISCHEMIC CARDIOMYOPATHY: Primary | ICD-10-CM

## 2018-02-06 DIAGNOSIS — I10 ESSENTIAL HYPERTENSION: ICD-10-CM

## 2018-02-06 PROCEDURE — 93000 ELECTROCARDIOGRAM COMPLETE: CPT | Performed by: INTERNAL MEDICINE

## 2018-02-06 PROCEDURE — 99214 OFFICE O/P EST MOD 30 MIN: CPT | Performed by: INTERNAL MEDICINE

## 2018-02-06 NOTE — PROGRESS NOTES
subjective     Chief Complaint   Patient presents with   • Hypertension   • Hyperlipidemia   • Coronary Artery Disease     History of Present Illness  Patient is 67 years old white male who has history of coronary artery disease.  He had coronary artery bypass graft surgery in 2001.  Later on in 2014 he had a myocardial infarction and possibly stroke he was treated at [facility].  In 2015 he developed ischemic cardiomyopathy and congestive heart failure.  Apparently his LV ejection fraction was 10% and he had LV apical clot since then he has been taking Coumadin.  He was offered transplant registry but patient declined he also declined ICD.  Patient states that he had coronary angiography at  at that time but there was no new blockages requiring intervention.    Patient was seen by me about 6 months ago.  He was started on entresto.  He continued Coreg and Lasix.  Repeat echocardiogram showed LV ejection fraction around 25%.    He complains of being very weak tired fatigue and lack of energy.  He denies any chest pain.  Recently about a month ago he fell and hit his head and is planning to have CT scan.    Today he comes for cardiac follow-up and states that he is ready to consider ICD.  His blood pressure is around 110s systolic.  Heart rate is also controlled.    His other problems consist of diabetes mellitus, hyperlipidemia chronic anticoagulation.      Past Surgical History:   Procedure Laterality Date   • CARDIAC SURGERY      Open heart      Family History   Problem Relation Age of Onset   • Vision loss Other    • Coronary artery disease Other    • Diabetes Mother    • Arthritis Mother    • Heart attack Father    • Heart disease Sister    • Seizures Sister    • Heart disease Brother      Past Medical History:   Diagnosis Date   • Allergic rhinitis    • Anxiety    • CAD (coronary artery disease)    • CHF (congestive heart failure)    • COPD (chronic obstructive pulmonary disease)    • Diabetes mellitus      TYPE ll   • Diverticulosis    • Erectile dysfunction    • Gastritis    • GERD (gastroesophageal reflux disease)    • Head injury 2/1/2018   • Hx of long-term (current) use of anticoagulants    • Hyperlipidemia    • Hypertension    • Iron deficiency    • Mechanical back pain    • Non-small cell carcinoma of lung    • Vitamin D deficiency      Patient Active Problem List   Diagnosis   • Essential hypertension   • Mixed hyperlipidemia   • CAD (coronary artery disease)   • Chronic systolic congestive heart failure   • Chronic obstructive pulmonary disease with acute exacerbation   • Type 2 diabetes mellitus with peripheral neuropathy   • Vitamin D deficiency   • Mechanical back pain   • Gastroesophageal reflux disease without esophagitis   • Chronic seasonal allergic rhinitis   • Chronic anxiety   • Diverticulosis of large intestine without hemorrhage   • Vasculogenic erectile dysfunction   • Smoker   • Healthcare maintenance   • Iron deficiency anemia   • Long term current use of anticoagulant   • Head injury   • Weight loss   • Palpitations   • Ischemic cardiomyopathy       Social History   Substance Use Topics   • Smoking status: Current Every Day Smoker     Packs/day: 0.50     Years: 50.00     Types: Cigarettes   • Smokeless tobacco: Never Used   • Alcohol use No       No Known Allergies    Current Outpatient Prescriptions on File Prior to Visit   Medication Sig   • aspirin 81 MG chewable tablet Chew 1 tablet daily.   • JEAN CONTOUR NEXT TEST test strip    • Blood Glucose Monitoring Suppl (TRUE METRIX METER) w/Device kit USE AS DIRECTED TO TEST BLOOD SUGAR FOUR TIMES DAILY AS DIRECTED   • carvedilol (COREG) 12.5 MG tablet Take 1 tablet by mouth 2 (Two) Times a Day With Meals.   • DULoxetine (CYMBALTA) 60 MG capsule Take 1 capsule by mouth Daily.   • EASY COMFORT LANCETS misc USE TO TEST BLOOD SUGAR FOUR TIMES DAILY AS DIRECTED   • esomeprazole (nexIUM) 40 MG capsule Take 1 capsule by mouth 2 (Two) Times a Day.  "  • ferrous sulfate 325 (65 FE) MG tablet Take 1 tablet by mouth 3 (Three) Times a Day With Meals.   • furosemide (LASIX) 40 MG tablet Take 1 tablet by mouth Daily.   • Insulin Degludec-Liraglutide (XULTOPHY) 100-3.6 UNIT-MG/ML solution pen-injector Inject 50 Units under the skin Daily.   • insulin lispro (humaLOG) 100 UNIT/ML injection Inject 10 Units under the skin 3 (Three) Times a Day Before Meals.   • Insulin Pen Needle 32G X 4 MM misc 1 each 4 (Four) Times a Day.   • Insulin Syringe 28G X 1/2\" 0.5 ML misc 2 (two) times a day.   • Lancet Device misc daily.   • metFORMIN (GLUCOPHAGE) 1000 MG tablet    • oxyCODONE (ROXICODONE) 10 MG tablet Take 1 tablet by mouth 4 (Four) Times a Day.   • rosuvastatin (CRESTOR) 20 MG tablet Take 1 tablet by mouth Daily.   • sacubitril-valsartan (ENTRESTO) 24-26 MG tablet Take 1 tablet by mouth 2 (Two) Times a Day.   • warfarin (COUMADIN) 1 MG tablet Take 1 tablet by mouth Daily.   • warfarin (COUMADIN) 4 MG tablet Take 1 tablet by mouth Daily.     No current facility-administered medications on file prior to visit.          The following portions of the patient's history were reviewed and updated as appropriate: allergies, current medications, past family history, past medical history, past social history, past surgical history and problem list.    Review of Systems   Constitution: Positive for weakness and malaise/fatigue.   HENT: Negative.  Negative for congestion.    Eyes: Negative.    Cardiovascular: Positive for dyspnea on exertion. Negative for chest pain, cyanosis, irregular heartbeat, leg swelling, near-syncope, orthopnea, palpitations, paroxysmal nocturnal dyspnea and syncope.   Respiratory: Positive for shortness of breath.    Hematologic/Lymphatic: Negative.    Musculoskeletal: Negative.    Gastrointestinal: Negative.    Neurological: Negative for headaches.          Objective:     /70 (BP Location: Left arm, Patient Position: Sitting, Cuff Size: Adult)  Pulse 79 " " Resp 20  Ht 172.7 cm (68\")  Wt 76.2 kg (168 lb)  SpO2 98%  BMI 25.54 kg/m2  Physical Exam   Constitutional: He appears well-developed and well-nourished. No distress.   HENT:   Head: Normocephalic and atraumatic.   Mouth/Throat: Oropharynx is clear and moist. No oropharyngeal exudate.   Eyes: Conjunctivae and EOM are normal. Pupils are equal, round, and reactive to light. No scleral icterus.   Neck: Normal range of motion. Neck supple. No JVD present. No tracheal deviation present. No thyromegaly present.   Cardiovascular: Normal rate, regular rhythm, normal heart sounds and intact distal pulses.  PMI is not displaced.  Exam reveals no gallop, no friction rub and no decreased pulses.    No murmur heard.  Pulses:       Carotid pulses are 3+ on the right side, and 3+ on the left side.       Radial pulses are 3+ on the right side, and 3+ on the left side.   Pulmonary/Chest: Effort normal and breath sounds normal. No respiratory distress. He has no wheezes. He has no rales. He exhibits no tenderness.   Abdominal: Soft. Bowel sounds are normal. He exhibits no distension, no abdominal bruit and no mass. There is no splenomegaly or hepatomegaly. There is no tenderness. There is no rebound and no guarding.   Musculoskeletal: Normal range of motion. He exhibits no edema, tenderness or deformity.   Lymphadenopathy:     He has no cervical adenopathy.   Neurological: He is alert. He has normal reflexes. No cranial nerve deficit. He exhibits normal muscle tone. Coordination normal.   Skin: Skin is warm and dry. No rash noted. He is not diaphoretic. No erythema.   Psychiatric: He has a normal mood and affect. His behavior is normal. Judgment and thought content normal.         Lab Review  Lab Results   Component Value Date     (L) 01/30/2018    K 4.9 01/30/2018     01/30/2018    BUN 24 (H) 01/30/2018    CREATININE 1.23 01/30/2018    GLUCOSE 219 (H) 04/11/2017     (H) 01/30/2018    CALCIUM 10.3 (H) " 01/30/2018     (H) 01/30/2018    ALKPHOS 83 01/30/2018    LABIL2 2.0 01/30/2018     No results found for: CKTOTAL  Lab Results   Component Value Date    WBC 10.75 11/07/2017    HGB 14.0 11/07/2017    HCT 42.6 11/07/2017     11/07/2017     Lab Results   Component Value Date    INR 2.04 (H) 02/09/2017    INR 1.16 (H) 01/03/2017    INR 3.0 (A) 12/20/2016     No results found for: MG  Lab Results   Component Value Date    TSH 1.581 01/30/2018     No results found for: BNP  Lab Results   Component Value Date    CHLPL 109 01/30/2018    CHOL 305 (H) 04/11/2017    TRIG 278 (H) 01/30/2018    HDL 36 (L) 01/30/2018    LDLCALC  04/11/2017      Comment:      Unable to calculate    VLDL 55.6 01/30/2018    LDLHDL  04/11/2017      Comment:      Unable to calculate           ECG 12 Lead  Date/Time: 2/6/2018 3:28 PM  Performed by: MU ENGLISH  Authorized by: [facility] ENGLISH   Comparison: compared with previous ECG from 9/25/2017  Similar to previous ECG  Rhythm: sinus rhythm  Ectopy: PVCs  Rate: normal  Conduction: non-specific intraventricular conduction delay  QRS axis: normal  Other findings: LAE  Clinical impression: abnormal ECG  Comments: Possible old inferior wall myocardial infarction, diffuse anterior wall T-wave changes               I personally viewed and interpreted the patient's LAB data         Assessment:     1. Ischemic cardiomyopathy    2. Palpitations    3. Chronic systolic congestive heart failure    4. Essential hypertension    5. Mixed hyperlipidemia          Plan:      Patient has known coronary artery disease status post CABG.  He was seen by me because of ischemic cardiomyopathy and systolic heart failure  Patient's LV ejection fraction by old records was about 12%.  Is about 25% now.  Patient has been refusing ICD all this time but now is willing to consider it  He is currently on Coreg, Lasix, entresto,    He is anticoagulated with Coumadin and is taking baby  aspirin    Hyperlipidemia is treated with Crestor    He is also diabetic and is taking metformin and insulin.    EP consult with Dr. Beltran for further options.     Patient is agreeable at this time.        No Follow-up on file.

## 2018-02-12 ENCOUNTER — OFFICE VISIT (OUTPATIENT)
Dept: FAMILY MEDICINE CLINIC | Facility: CLINIC | Age: 68
End: 2018-02-12

## 2018-02-12 VITALS
TEMPERATURE: 97.1 F | BODY MASS INDEX: 25.61 KG/M2 | SYSTOLIC BLOOD PRESSURE: 105 MMHG | RESPIRATION RATE: 12 BRPM | DIASTOLIC BLOOD PRESSURE: 60 MMHG | WEIGHT: 169 LBS | HEIGHT: 68 IN | HEART RATE: 76 BPM | OXYGEN SATURATION: 98 %

## 2018-02-12 DIAGNOSIS — I10 ESSENTIAL HYPERTENSION: Primary | ICD-10-CM

## 2018-02-12 DIAGNOSIS — F17.200 SMOKER: ICD-10-CM

## 2018-02-12 DIAGNOSIS — M54.9 MECHANICAL BACK PAIN: ICD-10-CM

## 2018-02-12 DIAGNOSIS — Z00.00 HEALTHCARE MAINTENANCE: ICD-10-CM

## 2018-02-12 DIAGNOSIS — E55.9 VITAMIN D DEFICIENCY: ICD-10-CM

## 2018-02-12 DIAGNOSIS — J30.2 CHRONIC SEASONAL ALLERGIC RHINITIS, UNSPECIFIED TRIGGER: ICD-10-CM

## 2018-02-12 DIAGNOSIS — K21.9 GASTROESOPHAGEAL REFLUX DISEASE WITHOUT ESOPHAGITIS: ICD-10-CM

## 2018-02-12 DIAGNOSIS — I25.10 CORONARY ARTERY DISEASE INVOLVING NATIVE CORONARY ARTERY OF NATIVE HEART WITHOUT ANGINA PECTORIS: ICD-10-CM

## 2018-02-12 DIAGNOSIS — I50.22 CHRONIC SYSTOLIC CONGESTIVE HEART FAILURE (HCC): ICD-10-CM

## 2018-02-12 DIAGNOSIS — J44.1 CHRONIC OBSTRUCTIVE PULMONARY DISEASE WITH ACUTE EXACERBATION (HCC): ICD-10-CM

## 2018-02-12 DIAGNOSIS — E78.2 MIXED HYPERLIPIDEMIA: ICD-10-CM

## 2018-02-12 DIAGNOSIS — R63.4 WEIGHT LOSS: ICD-10-CM

## 2018-02-12 DIAGNOSIS — E11.42 TYPE 2 DIABETES MELLITUS WITH PERIPHERAL NEUROPATHY (HCC): ICD-10-CM

## 2018-02-12 DIAGNOSIS — S09.90XD INJURY OF HEAD, SUBSEQUENT ENCOUNTER: ICD-10-CM

## 2018-02-12 DIAGNOSIS — F41.9 CHRONIC ANXIETY: ICD-10-CM

## 2018-02-12 PROCEDURE — 99214 OFFICE O/P EST MOD 30 MIN: CPT | Performed by: GENERAL PRACTICE

## 2018-02-12 RX ORDER — OXYCODONE HYDROCHLORIDE 10 MG/1
10 TABLET ORAL 4 TIMES DAILY
Qty: 120 TABLET | Refills: 0 | Status: SHIPPED | OUTPATIENT
Start: 2018-02-12 | End: 2018-02-26 | Stop reason: SDUPTHER

## 2018-02-12 NOTE — PROGRESS NOTES
Subjective   Adria Zuñiga is a 67 y.o. male.     History of Present Illness     Fatigue  Returns with an approximate 6 week history of increased fatigue. This was preceded by a fall at home.  States he lost his balance while trying to get his foot into a pain and leg and fell against the flat side of his bedroom door.  He does not recall hitting his head but has had more frequent generalized headaches since.  There is no history of any changes in his vision, strength, or sensation nor in his gait or coordination.  He denies any nausea or vomiting and his had no fever, chills, or night sweats.  He has lost about 15 pounds over the last year.     COPD  The patient reports that his SOB, cough and wheezing have been worse over the last several months but relatively stable since last here. He states that these symptoms occur at any time during the day or night. He reports that his symptoms become worse with activity. His symptoms are reduced with rest and with inhaled inhaled medication. The patient states he also has nasal congestion. He is following the treatment plan, including medications as directed. He currently smokes approximately 1 packs per day.    Congestive Heart Failure  Patient has a history of congestive heart failure with an estimated EF of 26% on an echocardiogram performed on 10/3/17. 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 There was no thrombus noted on his most recent echocardiogram. Patient's current complaints are dyspnea with exertion and fatigue. He denies dyspnea at rest, orthopnea, paroxysmal nocturnal dyspnea, chest pain and palpitations. Current medications include entresto, carvedilol,  furosemide and coumadin.  He states he is compliant most of the time with his medications. He states he is noncompliant most of the time with his diet.  He underwent a cardiology reassessment with Dr. Marcus since last at which time a referral was made to  electrophysiology for a possible ICD    Coronary artery disease  He has a history of CABG and CHF. Previous diagnostic testing includes: cardiac catheterization Recent history: taking medications as instructed, no medication side effects noted, no TIA's, no chest pain on exertion, notes stable dyspnea on exertion, no change and no swelling of ankles. Patient's symptoms have been unchanged. Medication side effects include: none.    Diabetes  Current symptoms include none. Patient denies paresthesia of the feet, visual disturbances, polydipsia, polyuria, hypoglycemia and foot ulcerations. Evaluation to date has been: hemoglobin A1C. Home sugars: BGs are running  consistent with Hgb A1C. Current treatments: xultophy - 50 units daily. He refuses to do mealtime insulin. Last dilated eye exam more then one year ago. Most recent hemoglobin A1c   Lab Results   Component Value Date    HGBA1C 9.10 (H) 01/30/2018       Dyslipidemia  Compliance with treatment has been poor. The patient exercises rarely. He is currently being prescribed the following medication for his dyslipidemia - rosuvastatin.  Lab Results   Component Value Date    CHOL 305 (H) 04/11/2017    CHLPL 109 01/30/2018    TRIG 278 (H) 01/30/2018    HDL 36 (L) 01/30/2018    LDLCALC  04/11/2017      Comment:      Unable to calculate    LDL 17 01/30/2018      Chronic Low Back Pain  He complains of chronic lumbar back pain.  Pain is not related to a specific injury.  Symptoms include back pain,stiffness, and decreased ROJM.  He denies urinary incontinence or fecal incontinence.  Pain is located int he low back.  Radiation to left leg. Pain is described as varying from mild to severe burning and throbbing. Onset x years.  Symptoms exacerbated by standing and walking.  Symptoms alleviated by rest and opioid analgesics.  Associated symptoms include sexual dysfunction.  Pain causes functional limitation including general activity, mood, walking ability, work, and  activities of daily living. Previous MRI lumbar spine was reported as showing disc bulging L4/5 and L5/S1 with central protrusion of disc and facet arthropathy involving L3-S1. Currently on oxycodone 10 qid and feels this works better than the ER formulation    The following portions of the patient's history were reviewed and updated as appropriate: allergies, current medications, past medical history, past social history and problem list.    Review of Systems   Constitutional: Positive for fatigue. Negative for appetite change, chills, fever and unexpected weight change.   HENT: Positive for congestion and rhinorrhea. Negative for ear pain, sneezing, sore throat and voice change.    Eyes: Negative for visual disturbance.   Respiratory: Positive for cough, shortness of breath and wheezing.    Cardiovascular: Negative for chest pain, palpitations and leg swelling.   Gastrointestinal: Negative for abdominal pain, blood in stool, constipation, diarrhea, nausea and vomiting.   Endocrine: Negative for polydipsia and polyuria.   Genitourinary: Negative for difficulty urinating, dysuria, frequency, hematuria and urgency.   Musculoskeletal: Positive for arthralgias and back pain. Negative for joint swelling, myalgias and neck pain.   Skin: Negative for color change.   Neurological: Positive for headaches. Negative for tremors, weakness and numbness.   Psychiatric/Behavioral: Negative for dysphoric mood, sleep disturbance and suicidal ideas. The patient is not nervous/anxious.      Objective   Physical Exam   Constitutional: He is oriented to person, place, and time. He appears well-developed and well-nourished. He is cooperative. He does not have a sickly appearance. No distress.   Alert and in fair spirits.  Appeared older than stated age and chronically ill.  No apparent distress.  No pallor, cyanosis, diaphoresis, or jaundice.   HENT:   Head: Atraumatic.   Right Ear: Tympanic membrane, external ear and ear canal normal.    Left Ear: Tympanic membrane, external ear and ear canal normal.   Mouth/Throat: Oropharynx is clear and moist.   Eyes: EOM are normal. Pupils are equal, round, and reactive to light. No scleral icterus.   Neck: No JVD present. Carotid bruit is not present. No tracheal deviation present. No thyromegaly present.   Cardiovascular: Normal rate, regular rhythm, S1 normal, S2 normal, normal heart sounds and intact distal pulses.  Exam reveals no gallop.    No murmur heard.  Pulmonary/Chest: No accessory muscle usage. No respiratory distress. He has decreased breath sounds in the right lower field and the left lower field. He has wheezes (diffuse-primarily with forced expiration). He has no rales.   Mild pulmonary hyperinflation   Abdominal: Soft. Normal aorta and bowel sounds are normal. He exhibits no abdominal bruit and no mass. There is no hepatosplenomegaly. There is no tenderness. No hernia.   Musculoskeletal: He exhibits no deformity.   No peripheral joint redness or warmth   Lymphadenopathy:        Head (right side): No submandibular adenopathy present.        Head (left side): No submandibular adenopathy present.     He has no cervical adenopathy.   Neurological: He is alert and oriented to person, place, and time. He has normal strength and normal reflexes. He displays normal reflexes. A sensory deficit (decreased vibration sense both feet) is present. No cranial nerve deficit. He exhibits normal muscle tone. Coordination normal.   Skin: Skin is warm and dry. No rash noted. He is not diaphoretic. No pallor. Nails show no clubbing.   Psychiatric: He has a normal mood and affect. His behavior is normal.     Assessment/Plan   Problems Addressed this Visit        Cardiovascular and Mediastinum    Essential hypertension  Encouraged to continue to work on his diet and exercise plan.  Continue current medication    Mixed hyperlipidemia  As above.   Continue current medication.    CAD (coronary artery  disease)  Reminded regarding risk factor modification with an emphasis on diet and exercise.  Follow up with cardiology     Chronic systolic congestive heart failure  Follow up with electrophysiology        Respiratory    Chronic obstructive pulmonary disease   COPD is worsening.  Reminded of the importance of smoking cessation  Encouraged to remain as active as symptoms allow for    Chronic seasonal allergic rhinitis       Digestive    Vitamin D deficiency    Gastroesophageal reflux disease without esophagitis       Endocrine    Type 2 diabetes mellitus with peripheral neuropathy  Diabetes mellitus Type II, under poor control.   Encouraged to continue to pursue ADA diet  Encouraged aerobic exercise.  Reminded to get yearly retinal exam.   Updated labs will be drawn at his return        Nervous and Auditory    Mechanical back pain  Reminded regarding symptomatic treatment.   Continue current medication  Follow up with KAYLEE Lu    Relevant Medications    oxyCODONE (ROXICODONE) 10 MG tablet       Other    Chronic anxiety    Smoker    Healthcare maintenance  Reviewed the potential benefits of shingrix. Prescription written.    Relevant Medications    Zoster Vac Recomb Adjuvanted (SHINGRIX) 50 MCG reconstituted suspension    Head injury  Encouraged to follow-up with  CT of the head    Weight loss  Encouraged to follow-up with CT of the chest

## 2018-02-14 ENCOUNTER — TELEPHONE (OUTPATIENT)
Dept: FAMILY MEDICINE CLINIC | Facility: CLINIC | Age: 68
End: 2018-02-14

## 2018-02-14 NOTE — TELEPHONE ENCOUNTER
Spoke with pt about new appt time. 6-1-18 @ 11:00.      ----- Message from Edy Kline MD sent at 2/12/2018  3:40 PM EST -----  Patient has been scheduled to see Dr Vital by Dr Marcus but would rather see Dr Beltran  Please cancel the appt with Dr Vital and schedule with Dr Beltran  Dx - CHF - needs ICD

## 2018-02-26 ENCOUNTER — HOSPITAL ENCOUNTER (OUTPATIENT)
Dept: CT IMAGING | Facility: HOSPITAL | Age: 68
Discharge: HOME | End: 2018-02-26

## 2018-02-26 ENCOUNTER — HOSPITAL ENCOUNTER (OUTPATIENT)
Dept: CT IMAGING | Facility: HOSPITAL | Age: 68
Discharge: HOME | End: 2018-02-26
Admitting: GENERAL PRACTICE

## 2018-02-26 DIAGNOSIS — S09.90XD INJURY OF HEAD, SUBSEQUENT ENCOUNTER: ICD-10-CM

## 2018-02-26 DIAGNOSIS — J44.1 CHRONIC OBSTRUCTIVE PULMONARY DISEASE WITH ACUTE EXACERBATION (HCC): ICD-10-CM

## 2018-02-26 DIAGNOSIS — F17.200 SMOKER: ICD-10-CM

## 2018-02-26 DIAGNOSIS — M54.9 MECHANICAL BACK PAIN: ICD-10-CM

## 2018-02-26 DIAGNOSIS — R63.4 WEIGHT LOSS: ICD-10-CM

## 2018-02-26 LAB — CREAT BLDA-MCNC: 1.1 MG/DL (ref 0.6–1.3)

## 2018-02-26 PROCEDURE — 70470 CT HEAD/BRAIN W/O & W/DYE: CPT | Performed by: RADIOLOGY

## 2018-02-26 PROCEDURE — 71260 CT THORAX DX C+: CPT | Performed by: RADIOLOGY

## 2018-02-26 PROCEDURE — 70470 CT HEAD/BRAIN W/O & W/DYE: CPT

## 2018-02-26 PROCEDURE — 71260 CT THORAX DX C+: CPT

## 2018-02-26 PROCEDURE — 82565 ASSAY OF CREATININE: CPT

## 2018-02-26 PROCEDURE — 0 IOPAMIDOL 61 % SOLUTION: Performed by: GENERAL PRACTICE

## 2018-02-26 RX ORDER — OXYCODONE HYDROCHLORIDE 10 MG/1
10 TABLET ORAL 4 TIMES DAILY
Qty: 120 TABLET | Refills: 0 | Status: SHIPPED | OUTPATIENT
Start: 2018-02-26 | End: 2018-03-26 | Stop reason: SDUPTHER

## 2018-02-26 RX ADMIN — IOPAMIDOL 80 ML: 612 INJECTION, SOLUTION INTRAVENOUS at 15:00

## 2018-02-27 ENCOUNTER — OFFICE VISIT (OUTPATIENT)
Dept: FAMILY MEDICINE CLINIC | Facility: CLINIC | Age: 68
End: 2018-02-27

## 2018-02-27 VITALS
DIASTOLIC BLOOD PRESSURE: 60 MMHG | TEMPERATURE: 98.6 F | OXYGEN SATURATION: 97 % | HEIGHT: 68 IN | SYSTOLIC BLOOD PRESSURE: 120 MMHG | HEART RATE: 69 BPM | WEIGHT: 170 LBS | BODY MASS INDEX: 25.76 KG/M2

## 2018-02-27 DIAGNOSIS — I10 ESSENTIAL HYPERTENSION: ICD-10-CM

## 2018-02-27 DIAGNOSIS — I25.10 CORONARY ARTERY DISEASE INVOLVING NATIVE CORONARY ARTERY OF NATIVE HEART WITHOUT ANGINA PECTORIS: ICD-10-CM

## 2018-02-27 DIAGNOSIS — M54.9 MECHANICAL BACK PAIN: Primary | ICD-10-CM

## 2018-02-27 PROCEDURE — 99214 OFFICE O/P EST MOD 30 MIN: CPT | Performed by: PHYSICIAN ASSISTANT

## 2018-02-27 RX ORDER — PROMETHAZINE HYDROCHLORIDE 25 MG/1
25 TABLET ORAL EVERY 6 HOURS PRN
Qty: 60 TABLET | Refills: 1 | Status: SHIPPED | OUTPATIENT
Start: 2018-02-27 | End: 2019-08-22 | Stop reason: ALTCHOICE

## 2018-02-27 NOTE — PROGRESS NOTES
"Katie Zuñgia is a 67 y.o. male.     Chief complaint-back pain    History of Present Illness     Chronic back pain -  He complains of chronic lumbar back pain.  Pain is not related to a specific injury.  Symptoms include back pain,stiffness, and decreased ROJM.  He denies urinary incontinence or fecal incontinence.  Pain is located int he low back.  Radiation to left leg. Pain is described as varying from mild to severe burning and throbbing. Onset x years.  Symptoms exacerbated by standing and walking.  Symptoms alleviated by rest and opioid analgesics.  Associated symptoms include sexual dysfunction.  Pain causes functional limitation including general activity, mood, walking ability, work, and activities of daily living.    9/4/09 MRI lumbar spine = disc bulging L4/5 and L5/S1 with central protrusion of disc and facet arthropathy involving L3-S1    CAD - stable    Hypertension - stable    /60  Pulse 69  Temp 98.6 °F (37 °C) (Oral)   Ht 172.7 cm (67.99\")  Wt 77.1 kg (170 lb)  SpO2 97%  BMI 25.85 kg/m2    The following portions of the patient's history were reviewed and updated as appropriate: allergies, current medications, past family history, past medical history, past social history, past surgical history and problem list.    Review of Systems   Constitutional: Negative for activity change, appetite change and fever.   HENT: Negative for ear pain, sinus pressure and sore throat.    Eyes: Negative for pain and visual disturbance.   Respiratory: Negative for cough and chest tightness.    Cardiovascular: Negative for chest pain and palpitations.   Gastrointestinal: Negative for abdominal pain, constipation, diarrhea, nausea and vomiting.   Endocrine: Negative for polydipsia and polyuria.   Genitourinary: Negative for dysuria and frequency.   Musculoskeletal: Positive for back pain. Negative for myalgias.   Skin: Negative for color change and rash.             Allergic/Immunologic: Negative " "for food allergies and immunocompromised state.   Neurological: Negative for dizziness, syncope and headaches.   Hematological: Negative for adenopathy. Does not bruise/bleed easily.   Psychiatric/Behavioral: Negative for hallucinations and suicidal ideas. The patient is not nervous/anxious.        /60  Pulse 69  Temp 98.6 °F (37 °C) (Oral)   Ht 172.7 cm (67.99\")  Wt 77.1 kg (170 lb)  SpO2 97%  BMI 25.85 kg/m2    Objective   Physical Exam   Constitutional: He is oriented to person, place, and time. He appears well-developed and well-nourished.   HENT:   Head: Normocephalic and atraumatic.   Nose: Nose normal.   Mouth/Throat: Oropharynx is clear and moist.   Eyes: Conjunctivae and EOM are normal. Pupils are equal, round, and reactive to light.   Neck: Normal range of motion. Neck supple. No tracheal deviation present. No thyromegaly present.   Cardiovascular: Normal rate, regular rhythm, normal heart sounds and intact distal pulses.    No murmur heard.  Pulmonary/Chest: Effort normal and breath sounds normal. No respiratory distress. He has no wheezes.   Abdominal: Soft. Bowel sounds are normal. There is no tenderness. There is no guarding.   Musculoskeletal: Normal range of motion. He exhibits tenderness (lumbar musculature diffusely tender). He exhibits no edema.   Lymphadenopathy:     He has no cervical adenopathy.   Neurological: He is alert and oriented to person, place, and time.   Skin: Skin is warm and dry. No rash noted.   Psychiatric: He has a normal mood and affect. His behavior is normal.   Nursing note and vitals reviewed.      Assessment/Plan   Diagnoses and all orders for this visit:    Mechanical back pain    Coronary artery disease involving native coronary artery of native heart without angina pectoris    Essential hypertension    Other orders  -     promethazine (PHENERGAN) 25 MG tablet; Take 1 tablet by mouth Every 6 (Six) Hours As Needed for Nausea or Vomiting.    Prescription written " for oxycodone ER (OxyContin) 20 mg 1 tablet twice a day #60 with no refills    Danae #[id number] Reviewed and is consistent.  UDS 1/30/18 reviewed and is consistent.  Patient comfort assessment guide reviewed and updated today.    As part of the patient's treatment plan they are being prescribed a controlled substance/ substances with potential for abuse.  This patient has been made aware of the appropriate use of such medications, including potential risk of somnolence, limited ability to drive and/or work safely, and potential for overdose.  It has also been made clear these medications are for use by the patient only, without concomitant use of alcohol or other substances unless prescribed/advised by medical provider.    Patient has completed prescribing agreement detailing terms of continued prescribing of controlled substances including monitoring DANAE reports, urine drug screens, and pill counts.  The patient is aware DANAE reports are reviewed on a regular basis and scanned into the chart.    History and physical exam exhibit continued safe and appropriate use of controlled substances.

## 2018-03-20 DIAGNOSIS — E11.42 TYPE 2 DIABETES MELLITUS WITH PERIPHERAL NEUROPATHY (HCC): ICD-10-CM

## 2018-03-20 DIAGNOSIS — F41.9 CHRONIC ANXIETY: ICD-10-CM

## 2018-03-20 RX ORDER — DULOXETIN HYDROCHLORIDE 60 MG/1
60 CAPSULE, DELAYED RELEASE ORAL DAILY
Qty: 30 CAPSULE | Refills: 5 | Status: SHIPPED | OUTPATIENT
Start: 2018-03-20 | End: 2018-10-05 | Stop reason: SDUPTHER

## 2018-03-26 DIAGNOSIS — M54.9 MECHANICAL BACK PAIN: ICD-10-CM

## 2018-03-26 RX ORDER — OXYCODONE HYDROCHLORIDE 10 MG/1
10 TABLET ORAL 4 TIMES DAILY
Qty: 120 TABLET | Refills: 0 | Status: SHIPPED | OUTPATIENT
Start: 2018-03-26 | End: 2018-05-11 | Stop reason: SDUPTHER

## 2018-03-27 ENCOUNTER — OFFICE VISIT (OUTPATIENT)
Dept: FAMILY MEDICINE CLINIC | Facility: CLINIC | Age: 68
End: 2018-03-27

## 2018-03-27 VITALS
OXYGEN SATURATION: 95 % | DIASTOLIC BLOOD PRESSURE: 58 MMHG | BODY MASS INDEX: 26.07 KG/M2 | TEMPERATURE: 98.5 F | HEART RATE: 69 BPM | HEIGHT: 68 IN | SYSTOLIC BLOOD PRESSURE: 110 MMHG | WEIGHT: 172 LBS

## 2018-03-27 DIAGNOSIS — M54.9 MECHANICAL BACK PAIN: Primary | ICD-10-CM

## 2018-03-27 DIAGNOSIS — I10 ESSENTIAL HYPERTENSION: ICD-10-CM

## 2018-03-27 DIAGNOSIS — I25.10 CORONARY ARTERY DISEASE INVOLVING NATIVE CORONARY ARTERY OF NATIVE HEART WITHOUT ANGINA PECTORIS: ICD-10-CM

## 2018-03-27 DIAGNOSIS — Z79.891 LONG-TERM CURRENT USE OF OPIATE ANALGESIC: ICD-10-CM

## 2018-03-27 PROCEDURE — 99214 OFFICE O/P EST MOD 30 MIN: CPT | Performed by: PHYSICIAN ASSISTANT

## 2018-03-27 NOTE — PROGRESS NOTES
"Katie Zuñiga is a 67 y.o. male.     Chief complaint-back pain    History of Present Illness     Chronic back pain -  He complains of chronic lumbar back pain.  Pain is not related to a specific injury.  Symptoms include back pain,stiffness, and decreased ROJM.  He denies urinary incontinence or fecal incontinence.  Pain is located int he low back.  Radiation to left leg. Pain is described as varying from mild to severe burning and throbbing. Onset x years.  Symptoms exacerbated by standing and walking.  Symptoms alleviated by rest and opioid analgesics.  Associated symptoms include sexual dysfunction.  Pain causes functional limitation including general activity, mood, walking ability, work, and activities of daily living.    9/4/09 MRI lumbar spine = disc bulging L4/5 and L5/S1 with central protrusion of disc and facet arthropathy involving L3-S1    CAD - stable    Hypertension - stable with valsartan, Lasix, and coreg    /58   Pulse 69   Temp 98.5 °F (36.9 °C) (Oral)   Ht 172.7 cm (67.99\")   Wt 78 kg (172 lb)   SpO2 95%   BMI 26.16 kg/m²     The following portions of the patient's history were reviewed and updated as appropriate: allergies, current medications, past family history, past medical history, past social history, past surgical history and problem list.    Review of Systems   Constitutional: Negative for activity change, appetite change and fever.   HENT: Negative for ear pain, sinus pressure and sore throat.    Eyes: Negative for pain and visual disturbance.   Respiratory: Negative for cough and chest tightness.    Cardiovascular: Negative for chest pain and palpitations.   Gastrointestinal: Negative for abdominal pain, constipation, diarrhea, nausea and vomiting.   Endocrine: Negative for polydipsia and polyuria.   Genitourinary: Negative for dysuria and frequency.   Musculoskeletal: Positive for back pain. Negative for myalgias.   Skin: Negative for color change and rash.     " "        Allergic/Immunologic: Negative for food allergies and immunocompromised state.   Neurological: Negative for dizziness, syncope and headaches.   Hematological: Negative for adenopathy. Does not bruise/bleed easily.   Psychiatric/Behavioral: Negative for hallucinations and suicidal ideas. The patient is not nervous/anxious.        /58   Pulse 69   Temp 98.5 °F (36.9 °C) (Oral)   Ht 172.7 cm (67.99\")   Wt 78 kg (172 lb)   SpO2 95%   BMI 26.16 kg/m²     Objective   Physical Exam   Constitutional: He is oriented to person, place, and time. He appears well-developed and well-nourished.   HENT:   Head: Normocephalic and atraumatic.   Nose: Nose normal.   Mouth/Throat: Oropharynx is clear and moist.   Eyes: Conjunctivae and EOM are normal. Pupils are equal, round, and reactive to light.   Neck: Normal range of motion. Neck supple. No tracheal deviation present. No thyromegaly present.   Cardiovascular: Normal rate, regular rhythm, normal heart sounds and intact distal pulses.    No murmur heard.  Pulmonary/Chest: Effort normal and breath sounds normal. No respiratory distress. He has no wheezes.   Abdominal: Soft. Bowel sounds are normal. There is no tenderness. There is no guarding.   Musculoskeletal: Normal range of motion. He exhibits tenderness (lumbar musculature diffusely tender). He exhibits no edema.   Lymphadenopathy:     He has no cervical adenopathy.   Neurological: He is alert and oriented to person, place, and time.   Skin: Skin is warm and dry. No rash noted.   Psychiatric: He has a normal mood and affect. His behavior is normal.   Nursing note and vitals reviewed.      Assessment/Plan   Diagnoses and all orders for this visit:    Mechanical back pain    Coronary artery disease involving native coronary artery of native heart without angina pectoris    Essential hypertension    Long-term current use of opiate analgesic  -     Urine Drug Screen - Urine, Clean Catch    Prescription written " for oxycodone ER (OxyContin) 20 mg 1 tablet twice a day #60 with no refills    Danae #[id number] Reviewed and is consistent.  UDS 1/30/18 reviewed and is consistent.  Patient comfort assessment guide reviewed and updated today.    As part of the patient's treatment plan they are being prescribed a controlled substance/ substances with potential for abuse.  This patient has been made aware of the appropriate use of such medications, including potential risk of somnolence, limited ability to drive and/or work safely, and potential for overdose.  It has also been made clear these medications are for use by the patient only, without concomitant use of alcohol or other substances unless prescribed/advised by medical provider.    Patient has completed prescribing agreement detailing terms of continued prescribing of controlled substances including monitoring DANAE reports, urine drug screens, and pill counts.  The patient is aware DANAE reports are reviewed on a regular basis and scanned into the chart.    History and physical exam exhibit continued safe and appropriate use of controlled substances.

## 2018-04-23 ENCOUNTER — PRIOR AUTHORIZATION (OUTPATIENT)
Dept: FAMILY MEDICINE CLINIC | Facility: CLINIC | Age: 68
End: 2018-04-23

## 2018-04-23 DIAGNOSIS — K21.9 GASTROESOPHAGEAL REFLUX DISEASE WITHOUT ESOPHAGITIS: ICD-10-CM

## 2018-04-23 RX ORDER — ESOMEPRAZOLE MAGNESIUM 40 MG/1
40 CAPSULE, DELAYED RELEASE ORAL 2 TIMES DAILY
Qty: 60 CAPSULE | Refills: 5 | Status: SHIPPED | OUTPATIENT
Start: 2018-04-23 | End: 2018-11-16 | Stop reason: SDUPTHER

## 2018-05-08 ENCOUNTER — OFFICE VISIT (OUTPATIENT)
Dept: CARDIOLOGY | Facility: CLINIC | Age: 68
End: 2018-05-08

## 2018-05-08 VITALS
WEIGHT: 175 LBS | BODY MASS INDEX: 26.52 KG/M2 | OXYGEN SATURATION: 97 % | SYSTOLIC BLOOD PRESSURE: 138 MMHG | DIASTOLIC BLOOD PRESSURE: 68 MMHG | HEIGHT: 68 IN | HEART RATE: 60 BPM

## 2018-05-08 DIAGNOSIS — I50.22 CHRONIC SYSTOLIC CONGESTIVE HEART FAILURE (HCC): ICD-10-CM

## 2018-05-08 DIAGNOSIS — J44.1 CHRONIC OBSTRUCTIVE PULMONARY DISEASE WITH ACUTE EXACERBATION (HCC): ICD-10-CM

## 2018-05-08 DIAGNOSIS — I10 ESSENTIAL HYPERTENSION: ICD-10-CM

## 2018-05-08 DIAGNOSIS — I25.5 ISCHEMIC CARDIOMYOPATHY: ICD-10-CM

## 2018-05-08 DIAGNOSIS — R00.2 PALPITATIONS: ICD-10-CM

## 2018-05-08 DIAGNOSIS — I25.10 CORONARY ARTERY DISEASE INVOLVING NATIVE CORONARY ARTERY OF NATIVE HEART WITHOUT ANGINA PECTORIS: Primary | ICD-10-CM

## 2018-05-08 DIAGNOSIS — E78.2 MIXED HYPERLIPIDEMIA: ICD-10-CM

## 2018-05-08 PROCEDURE — 99213 OFFICE O/P EST LOW 20 MIN: CPT | Performed by: INTERNAL MEDICINE

## 2018-05-08 RX ORDER — ALBUTEROL SULFATE 90 UG/1
2 AEROSOL, METERED RESPIRATORY (INHALATION) EVERY 6 HOURS PRN
Qty: 6.7 G | Refills: 2 | Status: SHIPPED | OUTPATIENT
Start: 2018-05-08 | End: 2019-03-05 | Stop reason: SDUPTHER

## 2018-05-08 NOTE — PROGRESS NOTES
subjective     Chief Complaint   Patient presents with   • Coronary Artery Disease   • Hyperlipidemia   • Follow-up     History of Present Illness  Patient is 67 years old white male with known coronary artery disease status post CABG in 2001 followed by myocardial infarction and a stroke in 2014.  Patient was found to have ischemic cardiomyopathy with severe LV dysfunction LV ejection fraction around 10% LV apical thrombus.  Patient has been refusing ICD he also refused transplant registry at .  Patient now is taking Coreg along with entresto.  He is feeling better.  He denies chest pain palpitations.    He has no orthopnea PND or ankle edema.  Patient does have chronic cough but is a smoker and also carries a diagnoses of COPD.  Blood pressure is very well controlled.  He also has hyperlipidemia and is on Crestor therapy.  Lab work is scheduled soon.  We will check BNP also    Past Surgical History:   Procedure Laterality Date   • CARDIAC SURGERY      Open heart      Family History   Problem Relation Age of Onset   • Vision loss Other    • Coronary artery disease Other    • Diabetes Mother    • Arthritis Mother    • Heart attack Father    • Heart disease Sister    • Seizures Sister    • Heart disease Brother      Past Medical History:   Diagnosis Date   • Allergic rhinitis    • Anxiety    • CAD (coronary artery disease)    • CHF (congestive heart failure)    • COPD (chronic obstructive pulmonary disease)    • Diabetes mellitus     TYPE ll   • Diverticulosis    • Erectile dysfunction    • Gastritis    • GERD (gastroesophageal reflux disease)    • Head injury 2/1/2018   • Hx of long-term (current) use of anticoagulants    • Hyperlipidemia    • Hypertension    • Iron deficiency    • Mechanical back pain    • Non-small cell carcinoma of lung    • Vitamin D deficiency      Patient Active Problem List   Diagnosis   • Hypertension   • Mixed hyperlipidemia   • CAD (coronary artery disease)   • Chronic systolic  "congestive heart failure   • Chronic obstructive pulmonary disease with acute exacerbation   • Type 2 diabetes mellitus with peripheral neuropathy   • Vitamin D deficiency   • Mechanical back pain   • Gastroesophageal reflux disease without esophagitis   • Chronic seasonal allergic rhinitis   • Chronic anxiety   • Diverticulosis of large intestine without hemorrhage   • Vasculogenic erectile dysfunction   • Smoker   • Healthcare maintenance   • Iron deficiency anemia   • Long term current use of anticoagulant   • Head injury   • Weight loss   • Palpitations   • Ischemic cardiomyopathy       Social History   Substance Use Topics   • Smoking status: Current Every Day Smoker     Packs/day: 0.50     Years: 50.00     Types: Cigarettes   • Smokeless tobacco: Never Used   • Alcohol use No       No Known Allergies    Current Outpatient Prescriptions on File Prior to Visit   Medication Sig   • aspirin 81 MG chewable tablet Chew 1 tablet daily.   • JEAN CONTOUR NEXT TEST test strip    • Blood Glucose Monitoring Suppl (TRUE METRIX METER) w/Device kit USE AS DIRECTED TO TEST BLOOD SUGAR FOUR TIMES DAILY AS DIRECTED   • carvedilol (COREG) 12.5 MG tablet Take 1 tablet by mouth 2 (Two) Times a Day With Meals.   • DULoxetine (CYMBALTA) 60 MG capsule Take 1 capsule by mouth Daily.   • EASY COMFORT LANCETS misc USE TO TEST BLOOD SUGAR FOUR TIMES DAILY AS DIRECTED   • esomeprazole (nexIUM) 40 MG capsule Take 1 capsule by mouth 2 (Two) Times a Day.   • ferrous sulfate 325 (65 FE) MG tablet Take 1 tablet by mouth 3 (Three) Times a Day With Meals.   • Insulin Degludec-Liraglutide (XULTOPHY) 100-3.6 UNIT-MG/ML solution pen-injector Inject 50 Units under the skin Daily.   • insulin lispro (humaLOG) 100 UNIT/ML injection Inject 10 Units under the skin 3 (Three) Times a Day Before Meals.   • Insulin Pen Needle 32G X 4 MM misc 1 each 4 (Four) Times a Day.   • Insulin Syringe 28G X 1/2\" 0.5 ML misc 2 (two) times a day.   • Lancet Device misc " "daily.   • metFORMIN (GLUCOPHAGE) 1000 MG tablet    • oxyCODONE (ROXICODONE) 10 MG tablet Take 1 tablet by mouth 4 (Four) Times a Day.   • promethazine (PHENERGAN) 25 MG tablet Take 1 tablet by mouth Every 6 (Six) Hours As Needed for Nausea or Vomiting.   • rosuvastatin (CRESTOR) 20 MG tablet Take 1 tablet by mouth Daily.   • sacubitril-valsartan (ENTRESTO) 24-26 MG tablet Take 1 tablet by mouth 2 (Two) Times a Day.   • warfarin (COUMADIN) 1 MG tablet Take 1 tablet by mouth Daily.   • warfarin (COUMADIN) 4 MG tablet Take 1 tablet by mouth Daily.   • [DISCONTINUED] furosemide (LASIX) 40 MG tablet Take 1 tablet by mouth Daily.   • [DISCONTINUED] Zoster Vac Recomb Adjuvanted (SHINGRIX) 50 MCG reconstituted suspension Inject 50 mcg into the shoulder, thigh, or buttocks See Admin Instructions. Repeat in 2-6 months     No current facility-administered medications on file prior to visit.          The following portions of the patient's history were reviewed and updated as appropriate: allergies, current medications, past family history, past medical history, past social history, past surgical history and problem list.    Review of Systems   Constitution: Positive for weakness and malaise/fatigue.   HENT: Negative.    Eyes: Negative.    Cardiovascular: Positive for dyspnea on exertion. Negative for chest pain, claudication, cyanosis, irregular heartbeat, leg swelling, near-syncope, orthopnea, palpitations, paroxysmal nocturnal dyspnea and syncope.   Respiratory: Positive for cough and shortness of breath. Negative for sputum production and wheezing.    Endocrine: Negative.    Skin: Negative.    Gastrointestinal: Negative.    Genitourinary: Negative.           Objective:     /68 (BP Location: Left arm, Patient Position: Sitting)   Pulse 60   Ht 172.7 cm (67.99\")   Wt 79.4 kg (175 lb)   SpO2 97%   BMI 26.62 kg/m²   Physical Exam   Constitutional: He appears well-developed and well-nourished. No distress.   HENT: "   Head: Normocephalic and atraumatic.   Mouth/Throat: Oropharynx is clear and moist. No oropharyngeal exudate.   Eyes: Conjunctivae and EOM are normal. Pupils are equal, round, and reactive to light. No scleral icterus.   Neck: Normal range of motion. Neck supple. No JVD present. No tracheal deviation present. No thyromegaly present.   Cardiovascular: Normal rate, regular rhythm, normal heart sounds and intact distal pulses.  PMI is not displaced.  Exam reveals no gallop, no friction rub and no decreased pulses.    No murmur heard.  Pulses:       Carotid pulses are 3+ on the right side, and 3+ on the left side.       Radial pulses are 3+ on the right side, and 3+ on the left side.   Pulmonary/Chest: Effort normal and breath sounds normal. No respiratory distress. He has no wheezes. He has no rales. He exhibits no tenderness.   Abdominal: Soft. Bowel sounds are normal. He exhibits no distension, no abdominal bruit and no mass. There is no splenomegaly or hepatomegaly. There is no tenderness. There is no rebound and no guarding.   Musculoskeletal: Normal range of motion. He exhibits no edema, tenderness or deformity.   Lymphadenopathy:     He has no cervical adenopathy.   Neurological: He is alert. He has normal reflexes. No cranial nerve deficit. He exhibits normal muscle tone. Coordination normal.   Skin: Skin is warm and dry. No rash noted. He is not diaphoretic. No erythema.   Psychiatric: He has a normal mood and affect. His behavior is normal. Judgment and thought content normal.         Lab Review  Lab Results   Component Value Date     (L) 01/30/2018    K 4.9 01/30/2018     01/30/2018    BUN 24 (H) 01/30/2018    CREATININE 1.10 02/26/2018    GLUCOSE 219 (H) 04/11/2017     (H) 01/30/2018    CALCIUM 10.3 (H) 01/30/2018     (H) 01/30/2018    ALKPHOS 83 01/30/2018    LABIL2 2.0 01/30/2018     No results found for: CKTOTAL  Lab Results   Component Value Date    WBC 10.75 11/07/2017    HGB  14.0 11/07/2017    HCT 42.6 11/07/2017     11/07/2017     Lab Results   Component Value Date    INR 2.04 (H) 02/09/2017    INR 1.16 (H) 01/03/2017    INR 3.0 (A) 12/20/2016     No results found for: MG  Lab Results   Component Value Date    TSH 1.581 01/30/2018     No results found for: BNP  Lab Results   Component Value Date    CHLPL 109 01/30/2018    CHOL 305 (H) 04/11/2017    TRIG 278 (H) 01/30/2018    HDL 36 (L) 01/30/2018    VLDL 55.6 01/30/2018    LDLHDL  04/11/2017      Comment:      Unable to calculate     Lab Results   Component Value Date    LDL 17 01/30/2018    LDL CANCELED 11/07/2017       Procedures       I personally viewed and interpreted the patient's LAB data         Assessment:     1. Coronary artery disease involving native coronary artery of native heart without angina pectoris    2. Chronic systolic congestive heart failure    3. Essential hypertension    4. Ischemic cardiomyopathy    5. Mixed hyperlipidemia    6. Palpitations    7. Chronic obstructive pulmonary disease with acute exacerbation          Plan:      Patient has ischemic cardiomyopathy with severe LV dysfunction.  He seems to be doing very well.  There is no orthopnea PND or ankle edema.  He does complain of chronic cough but he is smoker and carries a diagnoses of COPD.  Will check BNP to see if it is related to heart failure.    Blood pressure is very well controlled.  Patient was advised to continue Coreg and the entresto.  Hyperlipidemia is being treated with the Crestor.  Lab work scheduled.  Healthy lifestyle dietary restrictions particularly being diabetic and cessation of tobacco use was strongly urged.  Patient was given Proventil HFA    No Follow-up on file.

## 2018-05-11 ENCOUNTER — OFFICE VISIT (OUTPATIENT)
Dept: FAMILY MEDICINE CLINIC | Facility: CLINIC | Age: 68
End: 2018-05-11

## 2018-05-11 DIAGNOSIS — I25.10 CORONARY ARTERY DISEASE INVOLVING NATIVE CORONARY ARTERY OF NATIVE HEART WITHOUT ANGINA PECTORIS: Primary | ICD-10-CM

## 2018-05-11 DIAGNOSIS — J44.9 COPD MIXED TYPE (HCC): ICD-10-CM

## 2018-05-11 DIAGNOSIS — D50.8 OTHER IRON DEFICIENCY ANEMIA: ICD-10-CM

## 2018-05-11 DIAGNOSIS — K21.9 GASTROESOPHAGEAL REFLUX DISEASE WITHOUT ESOPHAGITIS: ICD-10-CM

## 2018-05-11 DIAGNOSIS — F17.200 SMOKER: ICD-10-CM

## 2018-05-11 DIAGNOSIS — J30.2 CHRONIC SEASONAL ALLERGIC RHINITIS, UNSPECIFIED TRIGGER: ICD-10-CM

## 2018-05-11 DIAGNOSIS — Z00.00 HEALTHCARE MAINTENANCE: ICD-10-CM

## 2018-05-11 DIAGNOSIS — M54.9 MECHANICAL BACK PAIN: ICD-10-CM

## 2018-05-11 DIAGNOSIS — E55.9 VITAMIN D DEFICIENCY: ICD-10-CM

## 2018-05-11 DIAGNOSIS — E78.2 MIXED HYPERLIPIDEMIA: ICD-10-CM

## 2018-05-11 DIAGNOSIS — I50.22 CHRONIC SYSTOLIC CONGESTIVE HEART FAILURE (HCC): ICD-10-CM

## 2018-05-11 DIAGNOSIS — N52.01 ERECTILE DYSFUNCTION DUE TO ARTERIAL INSUFFICIENCY: ICD-10-CM

## 2018-05-11 DIAGNOSIS — F41.9 CHRONIC ANXIETY: ICD-10-CM

## 2018-05-11 DIAGNOSIS — K57.30 DIVERTICULOSIS OF LARGE INTESTINE WITHOUT HEMORRHAGE: ICD-10-CM

## 2018-05-11 DIAGNOSIS — I10 ESSENTIAL HYPERTENSION: ICD-10-CM

## 2018-05-11 DIAGNOSIS — E11.42 TYPE 2 DIABETES MELLITUS WITH PERIPHERAL NEUROPATHY (HCC): ICD-10-CM

## 2018-05-11 DIAGNOSIS — I25.5 ISCHEMIC CARDIOMYOPATHY: ICD-10-CM

## 2018-05-11 LAB
25(OH)D3+25(OH)D2 SERPL-MCNC: 30 NG/ML
ALBUMIN SERPL-MCNC: 4.7 G/DL (ref 3.4–4.8)
ALBUMIN/GLOB SERPL: 2 G/DL (ref 1.5–2.5)
ALP SERPL-CCNC: 60 U/L (ref 40–129)
ALT SERPL-CCNC: 37 U/L (ref 10–44)
AST SERPL-CCNC: 24 U/L (ref 10–34)
BASOPHILS # BLD AUTO: 0.01 10*3/MM3 (ref 0–0.3)
BASOPHILS NFR BLD AUTO: 0.1 % (ref 0–2)
BILIRUB SERPL-MCNC: 0.2 MG/DL (ref 0.2–1.8)
BUN SERPL-MCNC: 23 MG/DL (ref 7–21)
BUN/CREAT SERPL: 16.5 (ref 7–25)
CALCIUM SERPL-MCNC: 10 MG/DL (ref 7.7–10)
CHLORIDE SERPL-SCNC: 105 MMOL/L (ref 99–112)
CHOLEST SERPL-MCNC: 120 MG/DL (ref 0–200)
CO2 SERPL-SCNC: 26.3 MMOL/L (ref 24.3–31.9)
CREAT SERPL-MCNC: 1.39 MG/DL (ref 0.43–1.29)
EOSINOPHIL # BLD AUTO: 0.21 10*3/MM3 (ref 0–0.7)
EOSINOPHIL NFR BLD AUTO: 2.1 % (ref 0–7)
ERYTHROCYTE [DISTWIDTH] IN BLOOD BY AUTOMATED COUNT: 15.1 % (ref 11.5–14.5)
GFR SERPLBLD CREATININE-BSD FMLA CKD-EPI: 51 ML/MIN/1.73
GFR SERPLBLD CREATININE-BSD FMLA CKD-EPI: 62 ML/MIN/1.73
GLOBULIN SER CALC-MCNC: 2.3 GM/DL
GLUCOSE SERPL-MCNC: 150 MG/DL (ref 70–110)
HBA1C MFR BLD: 9.3 % (ref 4.5–5.7)
HCT VFR BLD AUTO: 40.9 % (ref 42–52)
HDLC SERPL-MCNC: 35 MG/DL (ref 60–100)
HGB BLD-MCNC: 13.5 G/DL (ref 14–18)
IMM GRANULOCYTES # BLD: 0.03 10*3/MM3 (ref 0–0.03)
IMM GRANULOCYTES NFR BLD: 0.3 % (ref 0–0.5)
INR PPP: 1.89 (ref 0.9–1.1)
LDLC SERPL CALC-MCNC: 23 MG/DL (ref 0–100)
LYMPHOCYTES # BLD AUTO: 2.99 10*3/MM3 (ref 1–3)
LYMPHOCYTES NFR BLD AUTO: 30.4 % (ref 16–46)
MCH RBC QN AUTO: 28.8 PG (ref 27–33)
MCHC RBC AUTO-ENTMCNC: 33 G/DL (ref 33–37)
MCV RBC AUTO: 87.2 FL (ref 80–94)
MONOCYTES # BLD AUTO: 0.96 10*3/MM3 (ref 0.1–0.9)
MONOCYTES NFR BLD AUTO: 9.8 % (ref 0–12)
NEUTROPHILS # BLD AUTO: 5.63 10*3/MM3 (ref 1.4–6.5)
NEUTROPHILS NFR BLD AUTO: 57.3 % (ref 40–75)
PLATELET # BLD AUTO: 321 10*3/MM3 (ref 130–400)
POTASSIUM SERPL-SCNC: 4.7 MMOL/L (ref 3.5–5.3)
PROT SERPL-MCNC: 7 G/DL (ref 6–8)
PROTHROMBIN TIME: 21.9 SECONDS (ref 11–15.4)
RBC # BLD AUTO: 4.69 10*6/MM3 (ref 4.7–6.1)
SODIUM SERPL-SCNC: 136 MMOL/L (ref 135–153)
TRIGL SERPL-MCNC: 309 MG/DL (ref 0–150)
TSH SERPL DL<=0.005 MIU/L-ACNC: 2.51 MIU/ML (ref 0.55–4.78)
VLDLC SERPL CALC-MCNC: 61.8 MG/DL
WBC # BLD AUTO: 9.83 10*3/MM3 (ref 4.5–12.5)

## 2018-05-11 PROCEDURE — 36415 COLL VENOUS BLD VENIPUNCTURE: CPT | Performed by: GENERAL PRACTICE

## 2018-05-11 PROCEDURE — 99214 OFFICE O/P EST MOD 30 MIN: CPT | Performed by: GENERAL PRACTICE

## 2018-05-11 RX ORDER — MONTELUKAST SODIUM 10 MG/1
10 TABLET ORAL DAILY
Qty: 30 TABLET | Refills: 5 | Status: SHIPPED | OUTPATIENT
Start: 2018-05-11 | End: 2018-11-21 | Stop reason: SDUPTHER

## 2018-05-11 RX ORDER — OXYCODONE HYDROCHLORIDE 10 MG/1
10 TABLET ORAL 4 TIMES DAILY
Qty: 120 TABLET | Refills: 0 | Status: SHIPPED | OUTPATIENT
Start: 2018-05-11 | End: 2018-06-18 | Stop reason: SDUPTHER

## 2018-05-11 NOTE — PROGRESS NOTES
Subjective   Adria Zuñiga is a 67 y.o. male.     History of Present Illness     COPD  The patient reports that his SOB, cough and wheezing have been relatively stable since last here. He states that these symptoms occur at any time during the day or night. He reports that his symptoms become worse with activity. His symptoms are reduced with rest and with inhaled inhaled medication. The patient states he also has nasal congestion. He is following the treatment plan, including medications as directed. He currently smokes approximately 1 packs per day. Updated CT of the chest performed on 2/26/18 was reported as showing emphysematous changes as well as a 4 mm CHENCHO nodule.     Congestive Heart Failure  Patient has a history of congestive heart failure with an estimated EF of 26% on an echocardiogram performed on 10/3/17. 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 There was no thrombus noted on his most recent echocardiogram. Patient's current complaints are dyspnea with exertion and fatigue. He denies dyspnea at rest, orthopnea, paroxysmal nocturnal dyspnea, chest pain and palpitations. Current medications include entresto, carvedilol,  furosemide and coumadin.  He states he is compliant most of the time with his medications. He states he is noncompliant most of the time with his diet.  He underwent a cardiology reassessment with Dr. Marcus since last at which time a referral was made to electrophysiology for a possible ICD    Coronary artery disease  He has a history of CABG and CHF. Previous diagnostic testing includes: cardiac catheterization Recent history: taking medications as instructed, no medication side effects noted, no TIA's, no chest pain on exertion however over the last several weeks he has had occasional epigastric pain described as an ache last 4-5 minutes at a time unassociated with any other symptoms with no identifiable precipitant, notes stable dyspnea  on exertion, no change and no swelling of ankles. Patient's symptoms have been unchanged. Medication side effects include: none. Underwent a cardiology reassessment with Dr Marcus this week with no changes made in his management    Diabetes  Current symptoms include none. Patient denies paresthesia of the feet, visual disturbances, polydipsia, polyuria, hypoglycemia and foot ulcerations. Evaluation to date has been: hemoglobin A1C. Home sugars: BGs are running  consistent with Hgb A1C. Current treatments: xultophy - 50 units daily. He refuses to do mealtime insulin. Last dilated eye exam more then one year ago.     Dyslipidemia  Compliance with treatment has been poor. The patient exercises rarely. He is currently being prescribed the following medication for his dyslipidemia - rosuvastatin.     Chronic Low Back Pain  He complains of chronic lumbar back pain.  Pain is not related to a specific injury.  Symptoms include back pain,stiffness, and decreased ROJM.  He denies urinary incontinence or fecal incontinence.  Pain is located int he low back.  Radiation to left leg. Pain is described as varying from mild to severe burning and throbbing. Onset x years.  Symptoms exacerbated by standing and walking.  Symptoms alleviated by rest and opioid analgesics.  Associated symptoms include sexual dysfunction.  Pain causes functional limitation including general activity, mood, walking ability, work, and activities of daily living. Previous MRI lumbar spine was reported as showing disc bulging L4/5 and L5/S1 with central protrusion of disc and facet arthropathy involving L3-S1. Currently on oxycodone 10 qid and feels that this helps some. With it he is able to perform his ADLs and walk some    The following portions of the patient's history were reviewed and updated as appropriate: allergies, current medications, past medical history, past social history and problem list.    Review of Systems   Constitutional: Positive for  fatigue. Negative for appetite change, chills, fever and unexpected weight change.   HENT: Positive for congestion, postnasal drip, rhinorrhea, sinus pressure and sneezing. Negative for ear pain, sore throat and voice change.    Eyes: Negative for visual disturbance.   Respiratory: Positive for cough, shortness of breath and wheezing.    Cardiovascular: Positive for chest pain. Negative for palpitations and leg swelling.   Gastrointestinal: Negative for abdominal pain, blood in stool, constipation, diarrhea, nausea and vomiting.   Endocrine: Negative for polydipsia and polyuria.   Genitourinary: Negative for difficulty urinating, dysuria, frequency, hematuria and urgency.   Musculoskeletal: Positive for arthralgias and back pain. Negative for joint swelling, myalgias and neck pain.   Skin: Negative for color change.   Neurological: Positive for headaches. Negative for tremors, weakness and numbness.   Psychiatric/Behavioral: Negative for dysphoric mood, sleep disturbance and suicidal ideas. The patient is not nervous/anxious.      Objective   Physical Exam   Constitutional: He is oriented to person, place, and time. He appears well-developed and well-nourished. He is cooperative. He does not have a sickly appearance. No distress.   Alert and in fair spirits.  Appeared older than stated age and chronically ill.  No apparent distress.  No pallor, cyanosis, diaphoresis, or jaundice.   HENT:   Head: Atraumatic.   Right Ear: Tympanic membrane, external ear and ear canal normal.   Left Ear: Tympanic membrane, external ear and ear canal normal.   Mouth/Throat: Oropharynx is clear and moist.   Eyes: EOM are normal. Pupils are equal, round, and reactive to light. No scleral icterus.   Neck: No JVD present. Carotid bruit is not present. No tracheal deviation present. No thyromegaly present.   Cardiovascular: Normal rate, regular rhythm, S1 normal, S2 normal, normal heart sounds and intact distal pulses.  Exam reveals no  gallop.    No murmur heard.  Pulmonary/Chest: No accessory muscle usage. No respiratory distress. He has decreased breath sounds in the right lower field and the left lower field. He has wheezes (diffuse-primarily with forced expiration). He has no rales.   Mild pulmonary hyperinflation   Abdominal: Soft. Normal aorta and bowel sounds are normal. He exhibits no abdominal bruit and no mass. There is no hepatosplenomegaly. There is no tenderness. No hernia.   Musculoskeletal: He exhibits no deformity.   No peripheral joint redness or warmth   Lymphadenopathy:        Head (right side): No submandibular adenopathy present.        Head (left side): No submandibular adenopathy present.     He has no cervical adenopathy.   Neurological: He is alert and oriented to person, place, and time. He has normal strength and normal reflexes. He displays normal reflexes. A sensory deficit (decreased vibration sense both feet) is present. No cranial nerve deficit. He exhibits normal muscle tone. Coordination normal.   Skin: Skin is warm and dry. No rash noted. He is not diaphoretic. No pallor. Nails show no clubbing.   Psychiatric: He has a normal mood and affect. His behavior is normal.     Assessment/Plan   Problems Addressed this Visit        Cardiovascular and Mediastinum    Essential hypertension  Encouraged to continue to work on his diet and exercise plan.  Continue current medication  Updated labs drawn.    Relevant Orders    CBC & Differential (Completed)    Comprehensive Metabolic Panel (Completed)    Mixed hyperlipidemia  As above.   Continue current medication.    Relevant Orders    Lipid Panel (Completed)    TSH (Completed)    CAD (coronary artery disease)   With recent atypical pain  Reminded regarding risk factor modification with an emphasis on tobacco cessation.  Prescription written for SL NTG and instructed regarding use and when to proceed to ER  Follow up with cardiology     Chronic systolic congestive heart  failure  As above.   Continue current medication.    Relevant Orders    Protime-INR (Completed)    Ischemic cardiomyopathy    Relevant Orders    Protime-INR (Completed)       Respiratory    COPD mixed type    Relevant Medications    montelukast (SINGULAIR) 10 MG tablet    Chronic seasonal allergic rhinitis  Reminded regarding allergen avoidance.  Trial of montelukast    Relevant Medications    montelukast (SINGULAIR) 10 MG tablet       Digestive    Vitamin D deficiency  Continue maintenance supplementation with monitoring.    Relevant Orders    Vitamin D 25 Hydroxy (Completed)    Gastroesophageal reflux disease without esophagitis    Diverticulosis of large intestine without hemorrhage       Endocrine    Type 2 diabetes mellitus with peripheral neuropathy  Diabetes mellitus Type II, under marginal control.   Encouraged to continue to pursue ADA diet  Encouraged aerobic exercise.    Relevant Orders    Hemoglobin A1c (Completed)       Nervous and Auditory    Mechanical back pain  Reminded regarding symptomatic treatment.   Continue current medication  Follow up with KAYLEE Lu    Relevant Medications    oxyCODONE (ROXICODONE) 10 MG tablet       Hematopoietic and Hemostatic    Iron deficiency anemia       Other    Chronic anxiety    Vasculogenic erectile dysfunction    Smoker    Healthcare maintenance  Patient remains uninterested in a screening colonoscopy

## 2018-05-12 VITALS
DIASTOLIC BLOOD PRESSURE: 70 MMHG | OXYGEN SATURATION: 98 % | WEIGHT: 173 LBS | HEART RATE: 76 BPM | BODY MASS INDEX: 26.22 KG/M2 | TEMPERATURE: 98 F | HEIGHT: 68 IN | RESPIRATION RATE: 12 BRPM | SYSTOLIC BLOOD PRESSURE: 125 MMHG

## 2018-05-12 PROBLEM — R63.4 WEIGHT LOSS: Status: RESOLVED | Noted: 2018-02-01 | Resolved: 2018-05-12

## 2018-05-12 RX ORDER — NITROGLYCERIN 0.4 MG/1
0.4 TABLET SUBLINGUAL
Qty: 28 TABLET | Refills: 12 | Status: SHIPPED | OUTPATIENT
Start: 2018-05-12

## 2018-05-13 ENCOUNTER — RESULTS ENCOUNTER (OUTPATIENT)
Dept: CARDIOLOGY | Facility: CLINIC | Age: 68
End: 2018-05-13

## 2018-05-13 DIAGNOSIS — I50.22 CHRONIC SYSTOLIC CONGESTIVE HEART FAILURE (HCC): ICD-10-CM

## 2018-05-13 DIAGNOSIS — E78.2 MIXED HYPERLIPIDEMIA: ICD-10-CM

## 2018-05-16 NOTE — PROGRESS NOTES
Spoke with pt about the following per Dr. Kline. VH    -- He should increase his coumadin to 4.5 daily

## 2018-06-12 RX ORDER — WARFARIN SODIUM 1 MG/1
1 TABLET ORAL
Qty: 30 TABLET | Refills: 5 | Status: SHIPPED | OUTPATIENT
Start: 2018-06-12 | End: 2018-10-29 | Stop reason: SDUPTHER

## 2018-06-12 RX ORDER — WARFARIN SODIUM 4 MG/1
4 TABLET ORAL
Qty: 30 TABLET | Refills: 5 | Status: SHIPPED | OUTPATIENT
Start: 2018-06-12 | End: 2018-10-29 | Stop reason: SDUPTHER

## 2018-06-18 DIAGNOSIS — M54.9 MECHANICAL BACK PAIN: ICD-10-CM

## 2018-06-18 RX ORDER — OXYCODONE HYDROCHLORIDE 10 MG/1
10 TABLET ORAL 4 TIMES DAILY
Qty: 120 TABLET | Refills: 0 | Status: SHIPPED | OUTPATIENT
Start: 2018-06-18 | End: 2018-07-06 | Stop reason: SDUPTHER

## 2018-06-19 ENCOUNTER — OFFICE VISIT (OUTPATIENT)
Dept: FAMILY MEDICINE CLINIC | Facility: CLINIC | Age: 68
End: 2018-06-19

## 2018-06-19 VITALS
HEART RATE: 85 BPM | OXYGEN SATURATION: 93 % | SYSTOLIC BLOOD PRESSURE: 130 MMHG | BODY MASS INDEX: 27.76 KG/M2 | HEIGHT: 64 IN | DIASTOLIC BLOOD PRESSURE: 70 MMHG | WEIGHT: 162.6 LBS

## 2018-06-19 DIAGNOSIS — Z79.891 LONG-TERM CURRENT USE OF OPIATE ANALGESIC: ICD-10-CM

## 2018-06-19 DIAGNOSIS — I25.10 CORONARY ARTERY DISEASE INVOLVING NATIVE CORONARY ARTERY OF NATIVE HEART WITHOUT ANGINA PECTORIS: ICD-10-CM

## 2018-06-19 DIAGNOSIS — M54.9 MECHANICAL BACK PAIN: Primary | ICD-10-CM

## 2018-06-19 DIAGNOSIS — I10 ESSENTIAL HYPERTENSION: ICD-10-CM

## 2018-06-19 PROCEDURE — 99214 OFFICE O/P EST MOD 30 MIN: CPT | Performed by: PHYSICIAN ASSISTANT

## 2018-06-19 NOTE — PROGRESS NOTES
"Katie Zuñiga is a 67 y.o. male.     Chief complaint-back pain    History of Present Illness     Chronic back pain -  He complains of chronic lumbar back pain.  Pain is not related to a specific injury.  Symptoms include back pain,stiffness, and decreased ROJM.  He denies urinary incontinence or fecal incontinence.  Pain is located int he low back.  Radiation to left leg. Pain is described as varying from mild to severe burning and throbbing. Onset x years.  Symptoms exacerbated by standing and walking.  Symptoms alleviated by rest and opioid analgesics.  Associated symptoms include sexual dysfunction.  Pain causes functional limitation including general activity, mood, walking ability, work, and activities of daily living.    9/4/09 MRI lumbar spine = disc bulging L4/5 and L5/S1 with central protrusion of disc and facet arthropathy involving L3-S1    CAD - stable with aspirin and Nitrostat when necessary    Hypertension - stable with valsartan, Lasix, and coreg    /70   Pulse 85   Ht 162.6 cm (64\")   Wt 73.8 kg (162 lb 9.6 oz)   SpO2 93%   BMI 27.91 kg/m²     The following portions of the patient's history were reviewed and updated as appropriate: allergies, current medications, past family history, past medical history, past social history, past surgical history and problem list.    Review of Systems   Constitutional: Negative for activity change, appetite change and fever.   HENT: Negative for ear pain, sinus pressure and sore throat.    Eyes: Negative for pain and visual disturbance.   Respiratory: Negative for cough and chest tightness.    Cardiovascular: Negative for chest pain and palpitations.   Gastrointestinal: Negative for abdominal pain, constipation, diarrhea, nausea and vomiting.   Endocrine: Negative for polydipsia and polyuria.   Genitourinary: Negative for dysuria and frequency.   Musculoskeletal: Positive for back pain. Negative for myalgias.   Skin: Negative for color " "change and rash.   Allergic/Immunologic: Negative for food allergies and immunocompromised state.   Neurological: Negative for dizziness, syncope and headaches.   Hematological: Negative for adenopathy. Does not bruise/bleed easily.   Psychiatric/Behavioral: Negative for hallucinations and suicidal ideas. The patient is not nervous/anxious.        /70   Pulse 85   Ht 162.6 cm (64\")   Wt 73.8 kg (162 lb 9.6 oz)   SpO2 93%   BMI 27.91 kg/m²     Objective   Physical Exam   Constitutional: He is oriented to person, place, and time. He appears well-developed and well-nourished.   HENT:   Head: Normocephalic and atraumatic.   Nose: Nose normal.   Mouth/Throat: Oropharynx is clear and moist.   Eyes: Conjunctivae and EOM are normal. Pupils are equal, round, and reactive to light.   Neck: Normal range of motion. Neck supple. No tracheal deviation present. No thyromegaly present.   Cardiovascular: Normal rate, regular rhythm, normal heart sounds and intact distal pulses.    No murmur heard.  Pulmonary/Chest: Effort normal and breath sounds normal. No respiratory distress. He has no wheezes.   Abdominal: Soft. Bowel sounds are normal. There is no tenderness. There is no guarding.   Musculoskeletal: Normal range of motion. He exhibits tenderness (lumbar musculature diffusely tender). He exhibits no edema.   Lymphadenopathy:     He has no cervical adenopathy.   Neurological: He is alert and oriented to person, place, and time.   Skin: Skin is warm and dry. No rash noted.   Psychiatric: He has a normal mood and affect. His behavior is normal.   Nursing note and vitals reviewed.      Assessment/Plan   Diagnoses and all orders for this visit:    Mechanical back pain    Long-term current use of opiate analgesic  -     Urine Drug Screen - Urine, Clean Catch; Future    Coronary artery disease involving native coronary artery of native heart without angina pectoris    Essential hypertension    Prescription written for " oxycodone ER (OxyContin) 20 mg 1 tablet twice a day #60 with no refills    Danae #[id number] Reviewed and is consistent.  UDS 2/27/18 reviewed and is consistent.  Patient comfort assessment guide reviewed and updated today.    As part of the patient's treatment plan they are being prescribed a controlled substance/ substances with potential for abuse.  This patient has been made aware of the appropriate use of such medications, including potential risk of somnolence, limited ability to drive and/or work safely, and potential for overdose.  It has also been made clear these medications are for use by the patient only, without concomitant use of alcohol or other substances unless prescribed/advised by medical provider.    Patient has completed prescribing agreement detailing terms of continued prescribing of controlled substances including monitoring DANAE reports, urine drug screens, and pill counts.  The patient is aware DANAE reports are reviewed on a regular basis and scanned into the chart.    History and physical exam exhibit continued safe and appropriate use of controlled substances.

## 2018-07-06 DIAGNOSIS — M54.9 MECHANICAL BACK PAIN: ICD-10-CM

## 2018-07-06 RX ORDER — OXYCODONE HYDROCHLORIDE 10 MG/1
10 TABLET ORAL 4 TIMES DAILY
Qty: 120 TABLET | Refills: 0 | Status: SHIPPED | OUTPATIENT
Start: 2018-07-06 | End: 2018-08-13 | Stop reason: SDUPTHER

## 2018-07-09 ENCOUNTER — OFFICE VISIT (OUTPATIENT)
Dept: FAMILY MEDICINE CLINIC | Facility: CLINIC | Age: 68
End: 2018-07-09

## 2018-07-09 VITALS
TEMPERATURE: 98.4 F | OXYGEN SATURATION: 93 % | WEIGHT: 165 LBS | BODY MASS INDEX: 28.17 KG/M2 | SYSTOLIC BLOOD PRESSURE: 100 MMHG | HEIGHT: 64 IN | DIASTOLIC BLOOD PRESSURE: 58 MMHG | HEART RATE: 59 BPM

## 2018-07-09 DIAGNOSIS — I25.10 CORONARY ARTERY DISEASE INVOLVING NATIVE CORONARY ARTERY OF NATIVE HEART WITHOUT ANGINA PECTORIS: ICD-10-CM

## 2018-07-09 DIAGNOSIS — J44.1 COPD EXACERBATION (HCC): Primary | ICD-10-CM

## 2018-07-09 DIAGNOSIS — I50.22 CHRONIC SYSTOLIC CONGESTIVE HEART FAILURE (HCC): ICD-10-CM

## 2018-07-09 DIAGNOSIS — I10 ESSENTIAL HYPERTENSION: ICD-10-CM

## 2018-07-09 DIAGNOSIS — M54.9 MECHANICAL BACK PAIN: ICD-10-CM

## 2018-07-09 PROCEDURE — 99214 OFFICE O/P EST MOD 30 MIN: CPT | Performed by: PHYSICIAN ASSISTANT

## 2018-07-09 RX ORDER — CEPHALEXIN 500 MG/1
500 CAPSULE ORAL 3 TIMES DAILY
Qty: 30 CAPSULE | Refills: 0 | Status: SHIPPED | OUTPATIENT
Start: 2018-07-09 | End: 2018-07-19

## 2018-07-09 RX ORDER — PREDNISONE 20 MG/1
40 TABLET ORAL DAILY
Qty: 10 TABLET | Refills: 0 | Status: SHIPPED | OUTPATIENT
Start: 2018-07-09 | End: 2018-11-16

## 2018-07-09 NOTE — PROGRESS NOTES
"Katie Zuñiga is a 67 y.o. male.     Chief complaint-cough    History of Present Illness     Cough-  He complains of productive cough with green sputum and associated wheezing for the past week.  He reports also being fatigued.    COPD-not at goal due to acute respiratory illness    Chronic back pain -  He complains of chronic lumbar back pain.  Pain is not related to a specific injury.  Symptoms include back pain,stiffness, and decreased ROJM.  He denies urinary incontinence or fecal incontinence.  Pain is located int he low back.  Radiation to left leg. Pain is described as varying from mild to severe burning and throbbing. Onset x years.  Symptoms exacerbated by standing and walking.  Symptoms alleviated by rest and opioid analgesics.  Associated symptoms include sexual dysfunction.  Pain causes functional limitation including general activity, mood, walking ability, work, and activities of daily living.    9/4/09 MRI lumbar spine = disc bulging L4/5 and L5/S1 with central protrusion of disc and facet arthropathy involving L3-S1    CAD  - stable with aspirin and Nitrostat when necessary    CHF - stable with entresto    Hypertension - stable with valsartan, Lasix, and coreg    Pain Score    07/09/18 1459   PainSc:   4   PainLoc: Back       /58   Pulse 59   Temp 98.4 °F (36.9 °C) (Oral)   Ht 162.6 cm (64.02\")   Wt 74.8 kg (165 lb)   SpO2 93%   BMI 28.31 kg/m²     The following portions of the patient's history were reviewed and updated as appropriate: allergies, current medications, past family history, past medical history, past social history, past surgical history and problem list.    Review of Systems   Constitutional: Positive for fatigue. Negative for activity change, appetite change and fever.   HENT: Negative for ear pain, sinus pressure and sore throat.    Eyes: Negative for pain and visual disturbance.   Respiratory: Positive for cough and wheezing. Negative for chest tightness.  " "  Cardiovascular: Negative for chest pain and palpitations.   Gastrointestinal: Negative for abdominal pain, constipation, diarrhea, nausea and vomiting.   Endocrine: Negative for polydipsia and polyuria.   Genitourinary: Negative for dysuria and frequency.   Musculoskeletal: Positive for back pain. Negative for myalgias.   Skin: Negative for color change and rash.   Allergic/Immunologic: Negative for food allergies and immunocompromised state.   Neurological: Negative for dizziness, syncope and headaches.   Hematological: Negative for adenopathy. Does not bruise/bleed easily.   Psychiatric/Behavioral: Negative for hallucinations and suicidal ideas. The patient is not nervous/anxious.        /58   Pulse 59   Temp 98.4 °F (36.9 °C) (Oral)   Ht 162.6 cm (64.02\")   Wt 74.8 kg (165 lb)   SpO2 93%   BMI 28.31 kg/m²     Objective   Physical Exam   Constitutional: He is oriented to person, place, and time. He appears well-developed and well-nourished.   HENT:   Head: Normocephalic and atraumatic.   Nose: Nose normal.   Mouth/Throat: Oropharynx is clear and moist.   Eyes: Conjunctivae and EOM are normal. Pupils are equal, round, and reactive to light.   Neck: Normal range of motion. Neck supple. No tracheal deviation present. No thyromegaly present.   Cardiovascular: Normal rate, regular rhythm, normal heart sounds and intact distal pulses.    No murmur heard.  Pulmonary/Chest: Effort normal. No respiratory distress. He has wheezes.   Diffuse wheezing noted throughout lung fields bilaterally   Abdominal: Soft. Bowel sounds are normal. There is no tenderness. There is no guarding.   Musculoskeletal: Normal range of motion. He exhibits tenderness (lumbar musculature diffusely tender). He exhibits no edema.   Lymphadenopathy:     He has no cervical adenopathy.   Neurological: He is alert and oriented to person, place, and time.   Skin: Skin is warm and dry. No rash noted.   Psychiatric: He has a normal mood and " affect. His behavior is normal.   Nursing note and vitals reviewed.      Assessment/Plan   Diagnoses and all orders for this visit:    COPD exacerbation (CMS/[facility])  -     cephalexin (KEFLEX) 500 MG capsule; Take 1 capsule by mouth 3 (Three) Times a Day for 10 days.    Mechanical back pain    Coronary artery disease involving native coronary artery of native heart without angina pectoris    Chronic systolic congestive heart failure (CMS/[facility])    Essential hypertension    Other orders  -     predniSONE (DELTASONE) 20 MG tablet; Take 2 tablets by mouth Daily. 2 daily    Prescription written for oxycodone ER (OxyContin) 20 mg 1 tablet twice a day #60 with no refills    Danae #[id number] Reviewed and is consistent.  UDS 6/19/18 reviewed and is consistent.  Patient comfort assessment guide reviewed and updated today.    As part of the patient's treatment plan they are being prescribed a controlled substance/ substances with potential for abuse.  This patient has been made aware of the appropriate use of such medications, including potential risk of somnolence, limited ability to drive and/or work safely, and potential for overdose.  It has also been made clear these medications are for use by the patient only, without concomitant use of alcohol or other substances unless prescribed/advised by medical provider.    Patient has completed prescribing agreement detailing terms of continued prescribing of controlled substances including monitoring DANAE reports, urine drug screens, and pill counts.  The patient is aware DANAE reports are reviewed on a regular basis and scanned into the chart.    History and physical exam exhibit continued safe and appropriate use of controlled substances.

## 2018-08-13 ENCOUNTER — OFFICE VISIT (OUTPATIENT)
Dept: FAMILY MEDICINE CLINIC | Facility: CLINIC | Age: 68
End: 2018-08-13

## 2018-08-13 VITALS
HEART RATE: 67 BPM | HEIGHT: 64 IN | SYSTOLIC BLOOD PRESSURE: 110 MMHG | OXYGEN SATURATION: 99 % | TEMPERATURE: 97.4 F | DIASTOLIC BLOOD PRESSURE: 65 MMHG | BODY MASS INDEX: 27.83 KG/M2 | WEIGHT: 163 LBS | RESPIRATION RATE: 12 BRPM

## 2018-08-13 DIAGNOSIS — M54.9 MECHANICAL BACK PAIN: ICD-10-CM

## 2018-08-13 DIAGNOSIS — J30.1 CHRONIC SEASONAL ALLERGIC RHINITIS DUE TO POLLEN: ICD-10-CM

## 2018-08-13 DIAGNOSIS — I10 ESSENTIAL HYPERTENSION: ICD-10-CM

## 2018-08-13 DIAGNOSIS — I50.22 CHRONIC SYSTOLIC CONGESTIVE HEART FAILURE (HCC): ICD-10-CM

## 2018-08-13 DIAGNOSIS — F17.200 SMOKER: ICD-10-CM

## 2018-08-13 DIAGNOSIS — E55.9 VITAMIN D DEFICIENCY: ICD-10-CM

## 2018-08-13 DIAGNOSIS — F41.9 CHRONIC ANXIETY: ICD-10-CM

## 2018-08-13 DIAGNOSIS — K57.30 DIVERTICULOSIS OF LARGE INTESTINE WITHOUT HEMORRHAGE: ICD-10-CM

## 2018-08-13 DIAGNOSIS — I25.10 CORONARY ARTERY DISEASE INVOLVING NATIVE CORONARY ARTERY OF NATIVE HEART WITHOUT ANGINA PECTORIS: Primary | ICD-10-CM

## 2018-08-13 DIAGNOSIS — Z00.00 HEALTHCARE MAINTENANCE: ICD-10-CM

## 2018-08-13 DIAGNOSIS — J44.9 COPD MIXED TYPE (HCC): ICD-10-CM

## 2018-08-13 DIAGNOSIS — K21.9 GASTROESOPHAGEAL REFLUX DISEASE WITHOUT ESOPHAGITIS: ICD-10-CM

## 2018-08-13 DIAGNOSIS — E11.42 TYPE 2 DIABETES MELLITUS WITH PERIPHERAL NEUROPATHY (HCC): ICD-10-CM

## 2018-08-13 DIAGNOSIS — E78.2 MIXED HYPERLIPIDEMIA: ICD-10-CM

## 2018-08-13 PROBLEM — S09.90XA HEAD INJURY: Status: RESOLVED | Noted: 2018-02-01 | Resolved: 2018-08-13

## 2018-08-13 PROBLEM — R00.2 PALPITATIONS: Status: RESOLVED | Noted: 2018-02-06 | Resolved: 2018-08-13

## 2018-08-13 PROCEDURE — 99214 OFFICE O/P EST MOD 30 MIN: CPT | Performed by: GENERAL PRACTICE

## 2018-08-13 RX ORDER — OXYCODONE HYDROCHLORIDE 10 MG/1
10 TABLET ORAL 4 TIMES DAILY
Qty: 120 TABLET | Refills: 0 | Status: SHIPPED | OUTPATIENT
Start: 2018-08-13 | End: 2018-08-31 | Stop reason: SDUPTHER

## 2018-08-13 NOTE — PROGRESS NOTES
Subjective   Adria Zuñiga is a 67 y.o. male.     History of Present Illness     COPD  The patient reports that his SOB, cough and wheezing have remained stable since last here. He states that these symptoms occur at any time during the day or night. He reports that his symptoms become worse with activity. His symptoms are reduced with rest and with inhaled inhaled medication. The patient states he also has nasal congestion. He is following the treatment plan, including medications as directed. He currently smokes approximately 1 packs per day. Updated CT of the chest performed on 2/26/18 was reported as showing emphysematous changes as well as a 4 mm CHENCHO nodule.     Congestive Heart Failure  Patient has a history of congestive heart failure with an estimated EF of 26% on an echocardiogram performed on 10/3/17. 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 There was no thrombus noted on his most recent echocardiogram. Patient's current complaints are dyspnea with exertion and fatigue. He denies dyspnea at rest, orthopnea, paroxysmal nocturnal dyspnea, chest pain and palpitations. Current medications include entresto, carvedilol,  furosemide and coumadin.  He states he is compliant most of the time with his medications. He states he is noncompliant most of the time with his diet.  He continues to be followed by cardiology    Coronary artery disease  He has a history of CABG and CHF. Previous diagnostic testing includes: cardiac catheterization Recent history: taking medications as instructed, no medication side effects noted, no TIA's, no chest pain on exertion, notes stable dyspnea on exertion, no change and no swelling of ankles. Patient's symptoms have been unchanged. Medication side effects include: none.     Diabetes  Current symptoms include none. Patient denies paresthesia of the feet, visual disturbances, polydipsia, polyuria, hypoglycemia and foot ulcerations.  Evaluation to date has been: hemoglobin A1C. Home sugars: BGs are running  consistent with Hgb A1C. Current treatments: xultophy - 50 units daily. He has not been using this consistently over the last month. Last dilated eye exam more then one year ago.  Lab Results   Component Value Date    HGBA1C 9.30 (H) 05/11/2018      Dyslipidemia  Compliance with treatment has been poor. The patient exercises rarely. He is currently being prescribed the following medication for his dyslipidemia - rosuvastatin.  Lab Results   Component Value Date    CHOL 305 (H) 04/11/2017    CHLPL 120 05/11/2018    TRIG 309 (H) 05/11/2018    HDL 35 (L) 05/11/2018    LDL 23 05/11/2018      Chronic Low Back Pain  He complains of chronic lumbar back pain.  Pain is not related to a specific injury.  Symptoms include back pain,stiffness, and decreased ROJM.  He denies urinary incontinence or fecal incontinence.  Pain is located int he low back.  Radiation to left leg. Pain is described as varying from mild to severe burning and throbbing. Onset x years.  Symptoms exacerbated by standing and walking.  Symptoms alleviated by rest and opioid analgesics.  Associated symptoms include sexual dysfunction.  Pain causes functional limitation including general activity, mood, walking ability, work, and activities of daily living. Previous MRI lumbar spine was reported as showing disc bulging L4/5 and L5/S1 with central protrusion of disc and facet arthropathy involving L3-S1. Currently on oxycodone 10 qid and feels that this helps some. With it he is able to perform his ADLs and walk some    The following portions of the patient's history were reviewed and updated as appropriate: allergies, current medications, past medical history, past social history and problem list.    Review of Systems   Constitutional: Positive for fatigue. Negative for appetite change, chills, fever and unexpected weight change.   HENT: Positive for rhinorrhea and sneezing. Negative  for congestion, ear pain, postnasal drip, sinus pressure, sore throat and voice change.    Eyes: Negative for visual disturbance.   Respiratory: Positive for cough, shortness of breath and wheezing.    Cardiovascular: Negative for chest pain, palpitations and leg swelling.   Gastrointestinal: Negative for abdominal pain, blood in stool, constipation, diarrhea, nausea and vomiting.   Endocrine: Negative for polydipsia and polyuria.   Genitourinary: Negative for difficulty urinating, dysuria, frequency, hematuria and urgency.   Musculoskeletal: Positive for arthralgias and back pain. Negative for joint swelling, myalgias and neck pain.   Skin: Negative for color change.   Neurological: Positive for headaches. Negative for tremors, weakness and numbness.   Psychiatric/Behavioral: Negative for dysphoric mood, sleep disturbance and suicidal ideas. The patient is not nervous/anxious.      Objective   Physical Exam   Constitutional: He is oriented to person, place, and time. He appears well-developed and well-nourished. He is cooperative. He does not have a sickly appearance. No distress.   Alert and in fair spirits.  Appeared older than stated age and chronically ill.  No apparent distress.  No pallor, cyanosis, diaphoresis, or jaundice.   HENT:   Head: Atraumatic.   Right Ear: Tympanic membrane, external ear and ear canal normal.   Left Ear: Tympanic membrane, external ear and ear canal normal.   Mouth/Throat: Oropharynx is clear and moist.   Eyes: Pupils are equal, round, and reactive to light. EOM are normal. No scleral icterus.   Neck: No JVD present. Carotid bruit is not present. No tracheal deviation present. No thyromegaly present.   Cardiovascular: Normal rate, regular rhythm, S1 normal, S2 normal, normal heart sounds and intact distal pulses.  Exam reveals no gallop.    No murmur heard.  Pulmonary/Chest: No accessory muscle usage. No respiratory distress. He has decreased breath sounds in the right lower field  and the left lower field. He has wheezes (diffuse-primarily with forced expiration). He has no rales.   Mild pulmonary hyperinflation   Abdominal: Soft. Normal aorta and bowel sounds are normal. He exhibits no abdominal bruit and no mass. There is no hepatosplenomegaly. There is no tenderness. No hernia.   Musculoskeletal: He exhibits no deformity.   No peripheral joint redness or warmth   Lymphadenopathy:        Head (right side): No submandibular adenopathy present.        Head (left side): No submandibular adenopathy present.     He has no cervical adenopathy.   Neurological: He is alert and oriented to person, place, and time. He has normal strength and normal reflexes. He displays normal reflexes. A sensory deficit (decreased vibration sense both feet) is present. No cranial nerve deficit. He exhibits normal muscle tone. Coordination normal.   Skin: Skin is warm and dry. No rash noted. He is not diaphoretic. No pallor. Nails show no clubbing.   Psychiatric: He has a normal mood and affect. His behavior is normal.     Assessment/Plan   Problems Addressed this Visit        Cardiovascular and Mediastinum    Essential hypertension  Hypertension: at goal. Evidence of target organ damage: coronary artery disease, prior coronary revascularization and heart failure.  Encouraged to continue to work on diet and exercise plan.   Continue current medication    Mixed hyperlipidemia  As above.   Continue current medication.    CAD (coronary artery disease)   Reminded regarding risk factor modification with an emphasis on tobacco cessation.  Continue current medication  Follow up with cardiology     Chronic systolic congestive heart failure (CMS/HCC)   Congestive heart failure due to coronary artery disease (CAD), hypertension and systolic dysfunction.   Heart failure is stable.  Reminded regarding the importance of lifestyle modification.  Continue current medication  Follow up with cardiology        Respiratory    COPD  mixed type (CMS/HCC)  COPD is stable.  Reminded of the importance of smoking cessation  Encouraged to remain as active as symptoms allow for    Chronic seasonal allergic rhinitis       Digestive    Vitamin D deficiency    Gastroesophageal reflux disease without esophagitis    Diverticulosis of large intestine without hemorrhage       Endocrine    Type 2 diabetes mellitus with peripheral neuropathy (CMS/HCC)  Diabetes mellitus Type II, under unsatisfactory control.   Encouraged to continue to pursue ADA diet  Encouraged aerobic exercise.   Encouraged to use his medication consistently        Nervous and Auditory    Mechanical back pain  Reminded regarding symptomatic treatment.   Continue current medication    Relevant Medications    oxyCODONE (ROXICODONE) 10 MG tablet       Other    Chronic anxiety    Smoker    Healthcare maintenance  Reminded to get a flu shot when available.

## 2018-08-31 DIAGNOSIS — M54.9 MECHANICAL BACK PAIN: ICD-10-CM

## 2018-08-31 RX ORDER — OXYCODONE HYDROCHLORIDE 10 MG/1
10 TABLET ORAL 4 TIMES DAILY
Qty: 120 TABLET | Refills: 0 | Status: SHIPPED | OUTPATIENT
Start: 2018-08-31 | End: 2018-10-08 | Stop reason: SDUPTHER

## 2018-09-11 ENCOUNTER — OFFICE VISIT (OUTPATIENT)
Dept: FAMILY MEDICINE CLINIC | Facility: CLINIC | Age: 68
End: 2018-09-11

## 2018-09-11 VITALS
BODY MASS INDEX: 28.51 KG/M2 | DIASTOLIC BLOOD PRESSURE: 60 MMHG | HEART RATE: 63 BPM | OXYGEN SATURATION: 99 % | TEMPERATURE: 97.3 F | HEIGHT: 64 IN | WEIGHT: 167 LBS | SYSTOLIC BLOOD PRESSURE: 122 MMHG

## 2018-09-11 DIAGNOSIS — M54.9 MECHANICAL BACK PAIN: Primary | ICD-10-CM

## 2018-09-11 DIAGNOSIS — M51.36 BULGING LUMBAR DISC: ICD-10-CM

## 2018-09-11 DIAGNOSIS — I25.10 CORONARY ARTERY DISEASE INVOLVING NATIVE CORONARY ARTERY OF NATIVE HEART WITHOUT ANGINA PECTORIS: ICD-10-CM

## 2018-09-11 DIAGNOSIS — Z79.891 LONG-TERM CURRENT USE OF OPIATE ANALGESIC: ICD-10-CM

## 2018-09-11 DIAGNOSIS — I10 ESSENTIAL HYPERTENSION: ICD-10-CM

## 2018-09-11 PROBLEM — M51.369 BULGING LUMBAR DISC: Status: ACTIVE | Noted: 2018-09-11

## 2018-09-11 PROCEDURE — 90686 IIV4 VACC NO PRSV 0.5 ML IM: CPT | Performed by: PHYSICIAN ASSISTANT

## 2018-09-11 PROCEDURE — 99214 OFFICE O/P EST MOD 30 MIN: CPT | Performed by: PHYSICIAN ASSISTANT

## 2018-09-11 PROCEDURE — G0008 ADMIN INFLUENZA VIRUS VAC: HCPCS | Performed by: PHYSICIAN ASSISTANT

## 2018-09-11 NOTE — PROGRESS NOTES
"Katie Zuñiga is a 67 y.o. male.     Chief complaint-back pain    History of Present Illness     Chronic back pain -  He complains of chronic lumbar back pain.  Pain is not related to a specific injury.  Symptoms include back pain,stiffness, and decreased ROJM.  He denies urinary incontinence or fecal incontinence.  Pain is located int he low back.  Radiation to left leg. Pain is described as varying from mild to severe burning and throbbing. Onset x years.  Symptoms exacerbated by standing and walking.  Symptoms alleviated by rest and opioid analgesics.  Associated symptoms include sexual dysfunction.  Pain causes functional limitation including general activity, mood, walking ability, work, and activities of daily living.    9/4/09 MRI lumbar spine = disc bulging L4/5 and L5/S1 with central protrusion of disc and facet arthropathy involving L3-S1    CAD - stable with aspirin and Nitrostat when necessary    Hypertension - stable with valsartan, Lasix, and coreg    /60   Pulse 63   Temp 97.3 °F (36.3 °C) (Temporal Artery )   Ht 162.6 cm (64\")   Wt 75.8 kg (167 lb)   SpO2 99%   BMI 28.67 kg/m²     The following portions of the patient's history were reviewed and updated as appropriate: allergies, current medications, past family history, past medical history, past social history, past surgical history and problem list.    Review of Systems   Constitutional: Negative for activity change, appetite change and fever.   HENT: Negative for ear pain, sinus pressure and sore throat.    Eyes: Negative for pain and visual disturbance.   Respiratory: Negative for cough and chest tightness.    Cardiovascular: Negative for chest pain and palpitations.   Gastrointestinal: Negative for abdominal pain, constipation, diarrhea, nausea and vomiting.   Endocrine: Negative for polydipsia and polyuria.   Genitourinary: Negative for dysuria and frequency.   Musculoskeletal: Positive for back pain. Negative for " "myalgias.   Skin: Negative for color change and rash.   Allergic/Immunologic: Negative for food allergies and immunocompromised state.   Neurological: Negative for dizziness, syncope and headaches.   Hematological: Negative for adenopathy. Does not bruise/bleed easily.   Psychiatric/Behavioral: Negative for hallucinations and suicidal ideas. The patient is not nervous/anxious.        /60   Pulse 63   Temp 97.3 °F (36.3 °C) (Temporal Artery )   Ht 162.6 cm (64\")   Wt 75.8 kg (167 lb)   SpO2 99%   BMI 28.67 kg/m²     Objective   Physical Exam   Constitutional: He is oriented to person, place, and time. He appears well-developed and well-nourished.   HENT:   Head: Normocephalic and atraumatic.   Nose: Nose normal.   Mouth/Throat: Oropharynx is clear and moist.   Eyes: Pupils are equal, round, and reactive to light. Conjunctivae and EOM are normal.   Neck: Normal range of motion. Neck supple. No tracheal deviation present. No thyromegaly present.   Cardiovascular: Normal rate, regular rhythm, normal heart sounds and intact distal pulses.    No murmur heard.  Pulmonary/Chest: Effort normal and breath sounds normal. No respiratory distress. He has no wheezes.   Abdominal: Soft. Bowel sounds are normal. There is no tenderness. There is no guarding.   Musculoskeletal: Normal range of motion. He exhibits tenderness (lumbar musculature diffusely tender). He exhibits no edema.   Lymphadenopathy:     He has no cervical adenopathy.   Neurological: He is alert and oriented to person, place, and time.   Skin: Skin is warm and dry. No rash noted.   Psychiatric: He has a normal mood and affect. His behavior is normal.   Nursing note and vitals reviewed.      Assessment/Plan   Diagnoses and all orders for this visit:    Mechanical back pain    Long-term current use of opiate analgesic  -     Urine Drug Screen - Urine, Clean Catch; Future    Bulging lumbar disc    Coronary artery disease involving native coronary artery of " native heart without angina pectoris    Essential hypertension    Other orders  -     Fluarix/Fluzone/Afluria Quad>6 Months    Prescription written for oxycodone ER (OxyContin) 20 mg 1 tablet twice a day #60 with no refills    Danae #[id number] Reviewed and is consistent.  UDS 6/19/18 reviewed and is consistent.  Patient comfort assessment guide reviewed and updated today.    As part of the patient's treatment plan they are being prescribed a controlled substance/ substances with potential for abuse.  This patient has been made aware of the appropriate use of such medications, including potential risk of somnolence, limited ability to drive and/or work safely, and potential for overdose.  It has also been made clear these medications are for use by the patient only, without concomitant use of alcohol or other substances unless prescribed/advised by medical provider.    Patient has completed prescribing agreement detailing terms of continued prescribing of controlled substances including monitoring DANAE reports, urine drug screens, and pill counts.  The patient is aware DANAE reports are reviewed on a regular basis and scanned into the chart.    History and physical exam exhibit continued safe and appropriate use of controlled substances.

## 2018-09-14 DIAGNOSIS — I25.10 CORONARY ARTERY DISEASE INVOLVING NATIVE CORONARY ARTERY OF NATIVE HEART WITHOUT ANGINA PECTORIS: ICD-10-CM

## 2018-09-14 DIAGNOSIS — I50.22 CHRONIC SYSTOLIC CONGESTIVE HEART FAILURE (HCC): ICD-10-CM

## 2018-09-14 RX ORDER — CARVEDILOL 12.5 MG/1
12.5 TABLET ORAL 2 TIMES DAILY WITH MEALS
Qty: 60 TABLET | Refills: 5 | Status: SHIPPED | OUTPATIENT
Start: 2018-09-14 | End: 2019-03-05

## 2018-10-05 DIAGNOSIS — F41.9 CHRONIC ANXIETY: ICD-10-CM

## 2018-10-05 DIAGNOSIS — E11.42 TYPE 2 DIABETES MELLITUS WITH PERIPHERAL NEUROPATHY (HCC): ICD-10-CM

## 2018-10-05 RX ORDER — FERROUS SULFATE 325(65) MG
325 TABLET ORAL
Qty: 90 TABLET | Refills: 5 | Status: SHIPPED | OUTPATIENT
Start: 2018-10-05 | End: 2019-04-25 | Stop reason: SDUPTHER

## 2018-10-05 RX ORDER — DULOXETIN HYDROCHLORIDE 60 MG/1
60 CAPSULE, DELAYED RELEASE ORAL DAILY
Qty: 30 CAPSULE | Refills: 5 | Status: SHIPPED | OUTPATIENT
Start: 2018-10-05 | End: 2019-04-25 | Stop reason: SDUPTHER

## 2018-10-05 RX ORDER — ROSUVASTATIN CALCIUM 20 MG/1
20 TABLET, COATED ORAL DAILY
Qty: 30 TABLET | Refills: 11 | Status: SHIPPED | OUTPATIENT
Start: 2018-10-05 | End: 2019-10-18 | Stop reason: SDUPTHER

## 2018-10-08 DIAGNOSIS — M54.9 MECHANICAL BACK PAIN: ICD-10-CM

## 2018-10-08 RX ORDER — OXYCODONE HYDROCHLORIDE 10 MG/1
10 TABLET ORAL 4 TIMES DAILY
Qty: 120 TABLET | Refills: 0 | Status: SHIPPED | OUTPATIENT
Start: 2018-10-08 | End: 2018-11-16 | Stop reason: SDUPTHER

## 2018-10-09 ENCOUNTER — OFFICE VISIT (OUTPATIENT)
Dept: FAMILY MEDICINE CLINIC | Facility: CLINIC | Age: 68
End: 2018-10-09

## 2018-10-09 VITALS
BODY MASS INDEX: 29.02 KG/M2 | OXYGEN SATURATION: 98 % | SYSTOLIC BLOOD PRESSURE: 110 MMHG | HEIGHT: 64 IN | TEMPERATURE: 97.9 F | DIASTOLIC BLOOD PRESSURE: 60 MMHG | HEART RATE: 63 BPM | WEIGHT: 170 LBS

## 2018-10-09 DIAGNOSIS — E78.2 MIXED HYPERLIPIDEMIA: ICD-10-CM

## 2018-10-09 DIAGNOSIS — M51.36 BULGING LUMBAR DISC: Primary | ICD-10-CM

## 2018-10-09 DIAGNOSIS — I25.10 CORONARY ARTERY DISEASE INVOLVING NATIVE CORONARY ARTERY OF NATIVE HEART WITHOUT ANGINA PECTORIS: ICD-10-CM

## 2018-10-09 DIAGNOSIS — M54.9 MECHANICAL BACK PAIN: ICD-10-CM

## 2018-10-09 PROCEDURE — 99214 OFFICE O/P EST MOD 30 MIN: CPT | Performed by: PHYSICIAN ASSISTANT

## 2018-10-10 NOTE — PROGRESS NOTES
"Katie Zuñiga is a 67 y.o. male.     Chief complaint-back pain    History of Present Illness     Chronic back pain -  He complains of chronic lumbar back pain.  Pain is not related to a specific injury.  Symptoms include back pain,stiffness, and decreased ROJM.  He denies urinary incontinence or fecal incontinence.  Pain is located int he low back.  Radiation to left leg. Pain is described as varying from mild to severe burning and throbbing. Onset x years.  Symptoms exacerbated by standing and walking.  Symptoms alleviated by rest and opioid analgesics.  Associated symptoms include sexual dysfunction.  Pain causes functional limitation including general activity, mood, walking ability, work, and activities of daily living.    9/4/09 MRI lumbar spine = disc bulging L4/5 and L5/S1 with central protrusion of disc and facet arthropathy involving L3-S1    CAD - stable with aspirin and Nitrostat when necessary    Hypertension - stable with valsartan, Lasix, and coreg    Pain Score    10/09/18 1615   PainSc:   7   PainLoc: Back       /60   Pulse 63   Temp 97.9 °F (36.6 °C) (Tympanic)   Ht 162.6 cm (64\")   Wt 77.1 kg (170 lb)   SpO2 98%   BMI 29.18 kg/m²     The following portions of the patient's history were reviewed and updated as appropriate: allergies, current medications, past family history, past medical history, past social history, past surgical history and problem list.    Review of Systems   Constitutional: Negative for activity change, appetite change and fever.   HENT: Negative for ear pain, sinus pressure and sore throat.    Eyes: Negative for pain and visual disturbance.   Respiratory: Negative for cough and chest tightness.    Cardiovascular: Negative for chest pain and palpitations.   Gastrointestinal: Negative for abdominal pain, constipation, diarrhea, nausea and vomiting.   Endocrine: Negative for polydipsia and polyuria.   Genitourinary: Negative for dysuria and frequency. " "  Musculoskeletal: Positive for back pain. Negative for myalgias.   Skin: Negative for color change and rash.   Allergic/Immunologic: Negative for food allergies and immunocompromised state.   Neurological: Negative for dizziness, syncope and headaches.   Hematological: Negative for adenopathy. Does not bruise/bleed easily.   Psychiatric/Behavioral: Negative for hallucinations and suicidal ideas. The patient is not nervous/anxious.        /60   Pulse 63   Temp 97.9 °F (36.6 °C) (Tympanic)   Ht 162.6 cm (64\")   Wt 77.1 kg (170 lb)   SpO2 98%   BMI 29.18 kg/m²     Objective   Physical Exam   Constitutional: He is oriented to person, place, and time. He appears well-developed and well-nourished.   HENT:   Head: Normocephalic and atraumatic.   Nose: Nose normal.   Mouth/Throat: Oropharynx is clear and moist.   Eyes: Pupils are equal, round, and reactive to light. Conjunctivae and EOM are normal.   Neck: Normal range of motion. Neck supple. No tracheal deviation present. No thyromegaly present.   Cardiovascular: Normal rate, regular rhythm, normal heart sounds and intact distal pulses.    No murmur heard.  Pulmonary/Chest: Effort normal and breath sounds normal. No respiratory distress. He has no wheezes.   Abdominal: Soft. Bowel sounds are normal. There is no tenderness. There is no guarding.   Musculoskeletal: Normal range of motion. He exhibits tenderness (lumbar musculature diffusely tender). He exhibits no edema.   Lymphadenopathy:     He has no cervical adenopathy.   Neurological: He is alert and oriented to person, place, and time.   Skin: Skin is warm and dry. No rash noted.   Psychiatric: He has a normal mood and affect. His behavior is normal.   Nursing note and vitals reviewed.      Assessment/Plan   Diagnoses and all orders for this visit:    Bulging lumbar disc    Mechanical back pain    Coronary artery disease involving native coronary artery of native heart without angina pectoris    Mixed " hyperlipidemia    Prescription written for oxycodone ER (OxyContin) 20 mg 1 tablet twice a day #60 with no refills    Danae #[id number] Reviewed and is consistent.  UDS 9/11/18 reviewed and is consistent.    As part of the patient's treatment plan they are being prescribed a controlled substance/ substances with potential for abuse.  This patient has been made aware of the appropriate use of such medications, including potential risk of somnolence, limited ability to drive and/or work safely, and potential for overdose.  It has also been made clear these medications are for use by the patient only, without concomitant use of alcohol or other substances unless prescribed/advised by medical provider.    Patient has completed prescribing agreement detailing terms of continued prescribing of controlled substances including monitoring DANAE reports, urine drug screens, and pill counts.  The patient is aware DANAE reports are reviewed on a regular basis and scanned into the chart.    History and physical exam exhibit continued safe and appropriate use of controlled substances.

## 2018-10-12 ENCOUNTER — TELEPHONE (OUTPATIENT)
Dept: FAMILY MEDICINE CLINIC | Facility: CLINIC | Age: 68
End: 2018-10-12

## 2018-10-12 NOTE — TELEPHONE ENCOUNTER
[employer] will not cover 60 nexium but will cove 30 and 30 of another stomach pill          ----- Message from SHIRA Maravilla sent at 10/9/2018  4:42 PM EDT -----   PA Nexium #60 bid per month  Failed omeprazole, nexium once daily, ranitidine

## 2018-10-16 RX ORDER — ESOMEPRAZOLE MAGNESIUM 40 MG/1
40 CAPSULE, DELAYED RELEASE ORAL DAILY
Qty: 30 CAPSULE | Refills: 5 | Status: SHIPPED | OUTPATIENT
Start: 2018-10-16 | End: 2019-04-08 | Stop reason: SDUPTHER

## 2018-10-16 RX ORDER — PANTOPRAZOLE SODIUM 40 MG/1
40 TABLET, DELAYED RELEASE ORAL DAILY
Qty: 30 TABLET | Refills: 5 | Status: SHIPPED | OUTPATIENT
Start: 2018-10-16 | End: 2018-11-16

## 2018-10-19 ENCOUNTER — TELEPHONE (OUTPATIENT)
Dept: FAMILY MEDICINE CLINIC | Facility: CLINIC | Age: 68
End: 2018-10-19

## 2018-10-19 NOTE — TELEPHONE ENCOUNTER
-I called gagandeep spoke with wife informed her ins will not cover #60 of the nexium but will cover nexium and protonix both so take nexium qam and protonix qhs .      ---- Message from SHIRA Maravilla sent at 10/16/2018 10:12 AM EDT -----   Please inform pt his insurance will not cover #60 of stomach medication but it will cover Nexium daily with protonix daily.  Take nexium qam and protonix qhs.  I eRx his meds already.

## 2018-10-19 NOTE — TELEPHONE ENCOUNTER
----- Message from SHIRA Maravilla sent at 10/16/2018 10:12 AM EDT -----   Please inform pt his insurance will not cover #60 of stomach medication but it will cover Nexium daily with protonix daily.  Take nexium qam and protonix qhs.  I eRx his meds already.

## 2018-10-29 ENCOUNTER — TELEPHONE (OUTPATIENT)
Dept: FAMILY MEDICINE CLINIC | Facility: CLINIC | Age: 68
End: 2018-10-29

## 2018-10-29 RX ORDER — WARFARIN SODIUM 1 MG/1
1 TABLET ORAL
Qty: 30 TABLET | Refills: 5 | Status: SHIPPED | OUTPATIENT
Start: 2018-10-29 | End: 2019-01-03

## 2018-10-29 RX ORDER — FUROSEMIDE 40 MG/1
TABLET ORAL
Qty: 60 TABLET | OUTPATIENT
Start: 2018-10-29

## 2018-10-29 RX ORDER — FUROSEMIDE 40 MG/1
40 TABLET ORAL EVERY MORNING
Qty: 30 TABLET | Refills: 2 | Status: SHIPPED | OUTPATIENT
Start: 2018-10-29 | End: 2019-02-01 | Stop reason: SDUPTHER

## 2018-10-29 RX ORDER — (INSULIN DEGLUDEC AND LIRAGLUTIDE) 100; 3.6 [IU]/ML; MG/ML
INJECTION, SOLUTION SUBCUTANEOUS
Qty: 15 ML | Refills: 5 | Status: SHIPPED | OUTPATIENT
Start: 2018-10-29 | End: 2019-01-03 | Stop reason: SDUPTHER

## 2018-10-29 RX ORDER — WARFARIN SODIUM 4 MG/1
4 TABLET ORAL
Qty: 30 TABLET | Refills: 5 | Status: SHIPPED | OUTPATIENT
Start: 2018-10-29 | End: 2019-01-03

## 2018-10-29 NOTE — TELEPHONE ENCOUNTER
----- Message from Edy Kline MD sent at 10/29/2018 12:40 PM EDT -----  Emailed    ----- Message -----  From: Jessica Barnes MA  Sent: 10/29/2018  11:01 AM  To: Edy Kline MD    Patient wants a refill on lasix. I did not see that in his medicine list. Could you send him a refill please?

## 2018-10-31 ENCOUNTER — TELEPHONE (OUTPATIENT)
Dept: FAMILY MEDICINE CLINIC | Facility: CLINIC | Age: 68
End: 2018-10-31

## 2018-10-31 NOTE — TELEPHONE ENCOUNTER
----- Message from Edy Kline MD sent at 10/31/2018  8:49 AM EDT -----  Sure - script sent to his pharm    ----- Message -----  From: Whitney Bartlett MA  Sent: 10/30/2018   3:17 PM  To: Edy Kline MD    I was trying to do a PA on the Entresto but it was not letting me. And I checked and I do have a current one approved for the 49-51mg. Want to change it to that?

## 2018-11-02 ENCOUNTER — TELEPHONE (OUTPATIENT)
Dept: FAMILY MEDICINE CLINIC | Facility: CLINIC | Age: 68
End: 2018-11-02

## 2018-11-02 NOTE — TELEPHONE ENCOUNTER
Adria wants to know if he can be put to sleep on mon for eye surgery capo said one time you told her he would not make it

## 2018-11-05 ENCOUNTER — TRANSCRIBE ORDERS (OUTPATIENT)
Dept: ADMINISTRATIVE | Facility: HOSPITAL | Age: 68
End: 2018-11-05

## 2018-11-05 DIAGNOSIS — I65.29 CAROTID ARTERY STENOSIS, UNILATERAL: ICD-10-CM

## 2018-11-05 DIAGNOSIS — I65.23 CAROTID STENOSIS, BILATERAL: ICD-10-CM

## 2018-11-05 DIAGNOSIS — R03.0 ELEVATED BLOOD PRESSURE READING WITHOUT DIAGNOSIS OF HYPERTENSION: ICD-10-CM

## 2018-11-05 DIAGNOSIS — I13.10 CARDIORENAL DISEASE: Primary | ICD-10-CM

## 2018-11-08 ENCOUNTER — OFFICE VISIT (OUTPATIENT)
Dept: CARDIOLOGY | Facility: CLINIC | Age: 68
End: 2018-11-08

## 2018-11-08 VITALS
WEIGHT: 172 LBS | HEART RATE: 88 BPM | BODY MASS INDEX: 29.37 KG/M2 | HEIGHT: 64 IN | SYSTOLIC BLOOD PRESSURE: 102 MMHG | RESPIRATION RATE: 16 BRPM | OXYGEN SATURATION: 98 % | DIASTOLIC BLOOD PRESSURE: 64 MMHG

## 2018-11-08 DIAGNOSIS — I25.10 CORONARY ARTERY DISEASE INVOLVING NATIVE CORONARY ARTERY OF NATIVE HEART WITHOUT ANGINA PECTORIS: Primary | ICD-10-CM

## 2018-11-08 DIAGNOSIS — I25.5 ISCHEMIC CARDIOMYOPATHY: ICD-10-CM

## 2018-11-08 DIAGNOSIS — I50.22 CHRONIC SYSTOLIC CONGESTIVE HEART FAILURE (HCC): ICD-10-CM

## 2018-11-08 DIAGNOSIS — I10 ESSENTIAL HYPERTENSION: ICD-10-CM

## 2018-11-08 PROCEDURE — 93000 ELECTROCARDIOGRAM COMPLETE: CPT | Performed by: INTERNAL MEDICINE

## 2018-11-08 PROCEDURE — 99213 OFFICE O/P EST LOW 20 MIN: CPT | Performed by: INTERNAL MEDICINE

## 2018-11-08 NOTE — PROGRESS NOTES
subjective     Chief Complaint   Patient presents with   • Coronary Artery Disease   • Cardiomyopathy   • Eye surgery     History of Present Illness    Patient is 67 years old white male who is planning to have cataract surgery.  He and his wife wanted no if it is acceptable for him to consider cataract surgery.  Patient is asymptomatic from cardiac standpoint.  He denies any chest pain palpitations or shortness of breath.  No orthopnea PND or ankle edema.  No syncope presyncope or dizziness.    Patient had history of coronary artery of    Graft surgery in 2001.  Later on he had myocardial infarction and stroke around 2014.  He was treated at [facility].  In 2015 age and developed ischemic cardiomyopathy and LV ejection fraction was reported at 10% with LV apical thrombus and he had been taking Coumadin.    Patient declined ICD consideration or transplant registry.    Patient also states that if coronary angiography done at  and no blockages were found and no intervention was required.    His LV ejection fraction improved from 10% to 25%.  Echo October 2017 .  Patient has no further workup no echo or stress test.  He however he feels that he has no symptoms at this time.    Past Surgical History:   Procedure Laterality Date   • CARDIAC SURGERY      Open heart      Family History   Problem Relation Age of Onset   • Vision loss Other    • Coronary artery disease Other    • Diabetes Mother    • Arthritis Mother    • Heart attack Father    • Heart disease Sister    • Seizures Sister    • Heart disease Brother      Past Medical History:   Diagnosis Date   • Allergic rhinitis    • Anxiety    • CAD (coronary artery disease)    • CHF (congestive heart failure) (CMS/HCC)    • COPD (chronic obstructive pulmonary disease) (CMS/HCC)    • Diabetes mellitus (CMS/HCC)     TYPE ll   • Diverticulosis    • Erectile dysfunction    • Gastritis    • GERD (gastroesophageal reflux disease)    • Head injury 2/1/2018   • Hx of  long-term (current) use of anticoagulants    • Hyperlipidemia    • Hypertension    • Iron deficiency    • Mechanical back pain    • Non-small cell carcinoma of lung (CMS/HCC)    • Vitamin D deficiency      Patient Active Problem List   Diagnosis   • Essential hypertension   • Mixed hyperlipidemia   • CAD (coronary artery disease)   • Chronic systolic congestive heart failure (CMS/HCC)   • COPD mixed type (CMS/HCC)   • Type 2 diabetes mellitus with peripheral neuropathy (CMS/HCC)   • Vitamin D deficiency   • Mechanical back pain   • Gastroesophageal reflux disease without esophagitis   • Chronic seasonal allergic rhinitis   • Chronic anxiety   • Diverticulosis of large intestine without hemorrhage   • Vasculogenic erectile dysfunction   • Smoker   • Healthcare maintenance   • Long term current use of anticoagulant   • Ischemic cardiomyopathy   • Bulging lumbar disc       Social History   Substance Use Topics   • Smoking status: Current Every Day Smoker     Packs/day: 0.50     Years: 50.00     Types: Cigarettes   • Smokeless tobacco: Never Used   • Alcohol use No       No Known Allergies    Current Outpatient Prescriptions on File Prior to Visit   Medication Sig   • albuterol (PROVENTIL HFA;VENTOLIN HFA) 108 (90 Base) MCG/ACT inhaler Inhale 2 puffs Every 6 (Six) Hours As Needed for Wheezing.   • aspirin 81 MG chewable tablet Chew 1 tablet daily.   • JEAN CONTOUR NEXT TEST test strip    • Blood Glucose Monitoring Suppl (TRUE METRIX METER) w/Device kit USE AS DIRECTED TO TEST BLOOD SUGAR FOUR TIMES DAILY AS DIRECTED   • carvedilol (COREG) 12.5 MG tablet Take 1 tablet by mouth 2 (Two) Times a Day With Meals.   • DULoxetine (CYMBALTA) 60 MG capsule Take 1 capsule by mouth Daily.   • EASY COMFORT LANCETS misc USE TO TEST BLOOD SUGAR FOUR TIMES DAILY AS DIRECTED   • esomeprazole (nexIUM) 40 MG capsule Take 1 capsule by mouth 2 (Two) Times a Day.   • esomeprazole (nexIUM) 40 MG capsule Take 1 capsule by mouth Daily.   •  "ferrous sulfate 325 (65 FE) MG tablet Take 1 tablet by mouth 3 (Three) Times a Day With Meals.   • furosemide (LASIX) 40 MG tablet Take 1 tablet by mouth Every Morning.   • Insulin Pen Needle 32G X 4 MM misc 1 each 4 (Four) Times a Day.   • Insulin Syringe 28G X 1/2\" 0.5 ML misc 2 (two) times a day.   • Lancet Device misc daily.   • metFORMIN (GLUCOPHAGE) 1000 MG tablet Take 1 tablet by mouth 2 (Two) Times a Day With Meals.   • montelukast (SINGULAIR) 10 MG tablet Take 1 tablet by mouth Daily.   • nitroglycerin (NITROSTAT) 0.4 MG SL tablet Place 1 tablet under the tongue Every 5 (Five) Minutes As Needed for Chest Pain. Take no more than 3 doses in 15 minutes.   • oxyCODONE (ROXICODONE) 10 MG tablet Take 1 tablet by mouth 4 (Four) Times a Day.   • pantoprazole (PROTONIX) 40 MG EC tablet Take 1 tablet by mouth Daily.   • predniSONE (DELTASONE) 20 MG tablet Take 2 tablets by mouth Daily. 2 daily   • promethazine (PHENERGAN) 25 MG tablet Take 1 tablet by mouth Every 6 (Six) Hours As Needed for Nausea or Vomiting.   • rosuvastatin (CRESTOR) 20 MG tablet Take 1 tablet by mouth Daily.   • sacubitril-valsartan (ENTRESTO) 49-51 MG tablet Take 1 tablet by mouth Every 12 (Twelve) Hours.   • warfarin (COUMADIN) 1 MG tablet Take 1 tablet by mouth Daily.   • warfarin (COUMADIN) 4 MG tablet Take 1 tablet by mouth Daily.   • XULTOPHY 100-3.6 UNIT-MG/ML solution pen-injector INJECT 50 UNITS SUBCUTANEOUSLY EVERY DAY     No current facility-administered medications on file prior to visit.          The following portions of the patient's history were reviewed and updated as appropriate: allergies, current medications, past family history, past medical history, past social history, past surgical history and problem list.    Review of Systems   Constitution: Negative.   HENT: Negative.  Negative for congestion.    Eyes: Negative.    Cardiovascular: Negative.  Negative for chest pain, cyanosis, dyspnea on exertion, irregular heartbeat, " "leg swelling, near-syncope, orthopnea, palpitations, paroxysmal nocturnal dyspnea and syncope.   Respiratory: Negative.  Negative for shortness of breath.    Hematologic/Lymphatic: Negative.    Musculoskeletal: Negative.    Gastrointestinal: Negative.    Neurological: Negative.  Negative for headaches.          Objective:     /64 (BP Location: Left arm)   Pulse 88   Resp 16   Ht 162.6 cm (64.02\")   Wt 78 kg (172 lb)   SpO2 98%   BMI 29.51 kg/m²   Physical Exam   Constitutional: He appears well-developed and well-nourished. No distress.   HENT:   Head: Normocephalic and atraumatic.   Mouth/Throat: Oropharynx is clear and moist. No oropharyngeal exudate.   Eyes: Pupils are equal, round, and reactive to light. Conjunctivae and EOM are normal. No scleral icterus.   Neck: Normal range of motion. Neck supple. No JVD present. No tracheal deviation present. No thyromegaly present.   Cardiovascular: Normal rate, regular rhythm, normal heart sounds and intact distal pulses.  PMI is not displaced.  Exam reveals no gallop, no friction rub and no decreased pulses.    No murmur heard.  Pulses:       Carotid pulses are 3+ on the right side, and 3+ on the left side.       Radial pulses are 3+ on the right side, and 3+ on the left side.   Pulmonary/Chest: Effort normal and breath sounds normal. No respiratory distress. He has no wheezes. He has no rales. He exhibits no tenderness.   Abdominal: Soft. Bowel sounds are normal. He exhibits no distension, no abdominal bruit and no mass. There is no splenomegaly or hepatomegaly. There is no tenderness. There is no rebound and no guarding.   Musculoskeletal: Normal range of motion. He exhibits no edema, tenderness or deformity.   Lymphadenopathy:     He has no cervical adenopathy.   Neurological: He is alert. He has normal reflexes. No cranial nerve deficit. He exhibits normal muscle tone. Coordination normal.   Skin: Skin is warm and dry. No rash noted. He is not diaphoretic. " No erythema.   Psychiatric: He has a normal mood and affect. His behavior is normal. Judgment and thought content normal.         Lab Review  Lab is not available for review     ECG 12 Lead  Date/Time: 11/8/2018 3:16 PM  Performed by: [facility] ENGLISH  Authorized by: [facility] ENGLISH   Comparison: compared with previous ECG from 2/6/2018  Comparison to previous ECG: Nonspecific T-wave changes are much better and patient has no PVCs on today's EKG  Rhythm: sinus rhythm  Rate: normal  BPM: 70  Conduction: non-specific intraventricular conduction delay  QRS axis: normal  Comments: Nonspecific ST and T wave changes                       Assessment:     1. Coronary artery disease involving native coronary artery of native heart without angina pectoris    2. Ischemic cardiomyopathy    3. Essential hypertension    4. Chronic systolic congestive heart failure (CMS/HCC)          Plan:      Patient has known coronary artery disease with ischemic cardiomyopathy and compensated congestive heart failure.  LV ejection fraction is quite low at 25%.  Patient is asymptomatic.  He denies any chest pain palpitations shortness of breath dizziness syncope or presyncope.  There is no ankle edema orthopnea or PND.    Patient still refuses consideration for ICD.  He has missed appointments with Dr. Lujan twice May 11, 2018 and 6/22/2018  He has missed appointment with Dr. Beltran 6 2018.    EKG today does not show any acute changes.    Because of minor nature of cataract surgery and patient being totally asymptomatic, I feel it is okay to consider surgery if needed, However he does present with significantly higher risk than average patient and I  don't have any recent echocardiogram or stress test on him .    Last echo is dated October 2017.    Thank you for letting me see this nice gentleman      No Follow-up on file.

## 2018-11-14 ENCOUNTER — TELEPHONE (OUTPATIENT)
Dept: FAMILY MEDICINE CLINIC | Facility: CLINIC | Age: 68
End: 2018-11-14

## 2018-11-14 ENCOUNTER — APPOINTMENT (OUTPATIENT)
Dept: ULTRASOUND IMAGING | Facility: HOSPITAL | Age: 68
End: 2018-11-14
Attending: OPHTHALMOLOGY

## 2018-11-14 ENCOUNTER — TELEPHONE (OUTPATIENT)
Dept: GENERAL RADIOLOGY | Facility: HOSPITAL | Age: 68
End: 2018-11-14

## 2018-11-14 ENCOUNTER — HOSPITAL ENCOUNTER (OUTPATIENT)
Dept: CARDIOLOGY | Facility: HOSPITAL | Age: 68
Discharge: HOME | End: 2018-11-14
Attending: OPHTHALMOLOGY | Admitting: OPHTHALMOLOGY

## 2018-11-14 DIAGNOSIS — I65.23 CAROTID STENOSIS, BILATERAL: ICD-10-CM

## 2018-11-14 PROCEDURE — 93880 EXTRACRANIAL BILAT STUDY: CPT | Performed by: RADIOLOGY

## 2018-11-14 PROCEDURE — 93880 EXTRACRANIAL BILAT STUDY: CPT

## 2018-11-14 NOTE — TELEPHONE ENCOUNTER
----- Message from Edy Kline MD sent at 11/14/2018  4:03 PM EST -----  Can wait until Friday    ----- Message -----  From: Jessica Barnes MA  Sent: 11/14/2018   3:20 PM  To: MD Dr. Сергей Sow's office called and stated that the patient had a recent carotid us done and it showed some blockages. They wanted to refer him back to us to have this taken care of. He has an appointment on Friday. Is that okay or does he need to come in tomorrow?

## 2018-11-16 ENCOUNTER — OFFICE VISIT (OUTPATIENT)
Dept: FAMILY MEDICINE CLINIC | Facility: CLINIC | Age: 68
End: 2018-11-16

## 2018-11-16 ENCOUNTER — RESULTS ENCOUNTER (OUTPATIENT)
Dept: FAMILY MEDICINE CLINIC | Facility: CLINIC | Age: 68
End: 2018-11-16

## 2018-11-16 DIAGNOSIS — Z00.00 HEALTHCARE MAINTENANCE: ICD-10-CM

## 2018-11-16 DIAGNOSIS — I10 ESSENTIAL HYPERTENSION: ICD-10-CM

## 2018-11-16 DIAGNOSIS — M54.9 MECHANICAL BACK PAIN: ICD-10-CM

## 2018-11-16 DIAGNOSIS — E11.42 TYPE 2 DIABETES MELLITUS WITH PERIPHERAL NEUROPATHY (HCC): ICD-10-CM

## 2018-11-16 DIAGNOSIS — K57.30 DIVERTICULOSIS OF LARGE INTESTINE WITHOUT HEMORRHAGE: ICD-10-CM

## 2018-11-16 DIAGNOSIS — J30.2 CHRONIC SEASONAL ALLERGIC RHINITIS: ICD-10-CM

## 2018-11-16 DIAGNOSIS — K21.9 GASTROESOPHAGEAL REFLUX DISEASE WITHOUT ESOPHAGITIS: ICD-10-CM

## 2018-11-16 DIAGNOSIS — I25.5 ISCHEMIC CARDIOMYOPATHY: ICD-10-CM

## 2018-11-16 DIAGNOSIS — E78.2 MIXED HYPERLIPIDEMIA: ICD-10-CM

## 2018-11-16 DIAGNOSIS — F41.9 CHRONIC ANXIETY: ICD-10-CM

## 2018-11-16 DIAGNOSIS — F17.200 SMOKER: ICD-10-CM

## 2018-11-16 DIAGNOSIS — J44.9 COPD MIXED TYPE (HCC): ICD-10-CM

## 2018-11-16 DIAGNOSIS — D50.0 IRON DEFICIENCY ANEMIA DUE TO CHRONIC BLOOD LOSS: ICD-10-CM

## 2018-11-16 DIAGNOSIS — I50.22 CHRONIC SYSTOLIC CONGESTIVE HEART FAILURE (HCC): ICD-10-CM

## 2018-11-16 DIAGNOSIS — E55.9 VITAMIN D DEFICIENCY: ICD-10-CM

## 2018-11-16 DIAGNOSIS — E78.2 MIXED HYPERLIPIDEMIA: Primary | ICD-10-CM

## 2018-11-16 DIAGNOSIS — I25.10 CORONARY ARTERY DISEASE INVOLVING NATIVE CORONARY ARTERY OF NATIVE HEART WITHOUT ANGINA PECTORIS: ICD-10-CM

## 2018-11-16 PROCEDURE — 99214 OFFICE O/P EST MOD 30 MIN: CPT | Performed by: GENERAL PRACTICE

## 2018-11-16 RX ORDER — OXYCODONE HYDROCHLORIDE 10 MG/1
10 TABLET ORAL 4 TIMES DAILY
Qty: 120 TABLET | Refills: 0 | Status: SHIPPED | OUTPATIENT
Start: 2018-11-16 | End: 2018-12-04 | Stop reason: SDUPTHER

## 2018-11-16 RX ORDER — BESIFLOXACIN 6 MG/ML
SUSPENSION OPHTHALMIC
Refills: 1 | COMMUNITY
Start: 2018-10-09 | End: 2018-12-04

## 2018-11-16 RX ORDER — TRIPROLIDINE/PSEUDOEPHEDRINE 2.5MG-60MG
TABLET ORAL
Refills: 1 | COMMUNITY
Start: 2018-10-09 | End: 2018-12-04

## 2018-11-16 RX ORDER — NEPAFENAC 0.3 %
SUSPENSION, DROPS(FINAL DOSAGE FORM)(ML) OPHTHALMIC (EYE)
Refills: 1 | COMMUNITY
Start: 2018-10-09 | End: 2018-12-04

## 2018-11-16 NOTE — PROGRESS NOTES
Subjective   Adria Zuñiga is a 67 y.o. male.     History of Present Illness     COPD  The patient reports that his SOB, cough and wheezing have been worse since last here. He states that these symptoms occur at any time during the day or night. He reports that his symptoms become worse with activity. His symptoms are reduced with rest and with inhaled inhaled medication. The patient states he also has nasal congestion. He is following the treatment plan, including medications as directed. He currently smokes approximately 1 packs per day. Updated CT of the chest performed on 2/26/18 was reported as showing emphysematous changes as well as a 4 mm CHENCHO nodule.     Congestive Heart Failure  Patient has a history of congestive heart failure with an estimated EF of 26% on an echocardiogram performed on 10/3/17. 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 There was no thrombus noted on his most recent echocardiogram. Patient's current complaints are dyspnea with exertion, lightheadedness, and fatigue. He denies dyspnea at rest, orthopnea, paroxysmal nocturnal dyspnea, chest pain and palpitations. Current medications include entresto, carvedilol,  furosemide and coumadin.  He states he is compliant most of the time with his medications. He states he is noncompliant most of the time with his diet.  He continues to be followed by cardiology    Coronary artery disease  He has a history of CABG and CHF. Previous diagnostic testing includes: cardiac catheterization Recent history: taking medications as instructed, no medication side effects noted, no TIA's, no chest pain on exertion, notes increased dyspnea on exertion, no change and no swelling of ankles. Medication side effects include: none.     Diabetes  Current symptoms include none. Patient denies paresthesia of the feet, visual disturbances, polydipsia, polyuria, hypoglycemia and foot ulcerations. Evaluation to date has been:  hemoglobin A1C. Home sugars: BGs are running  consistent with Hgb A1C. Current treatments: xultophy - 50 units daily. He has not had any recent labs. He is scheduled to undergo a right cataract extraction in 3 days under local anesthesia with no sedation     Dyslipidemia  Compliance with treatment has been poor. The patient exercises rarely. He is currently being prescribed the following medication for his dyslipidemia - rosuvastatin.     Chronic Low Back Pain  He complains of chronic lumbar back pain.  Pain is not related to a specific injury.  Symptoms include back pain,stiffness, and decreased ROJM.  He denies urinary incontinence or fecal incontinence.  Pain is located int he low back.  Radiation to left leg. Pain is described as varying from mild to severe burning and throbbing. Onset x years.  Symptoms exacerbated by standing and walking.  Symptoms alleviated by rest and opioid analgesics.  Associated symptoms include sexual dysfunction.  Pain causes functional limitation including general activity, mood, walking ability, work, and activities of daily living. Previous MRI lumbar spine was reported as showing disc bulging L4/5 and L5/S1 with central protrusion of disc and facet arthropathy involving L3-S1. Currently on oxycodone 10 qid and feels that this helps some. With it he is able to perform his ADLs and walk some    The following portions of the patient's history were reviewed and updated as appropriate: allergies, current medications, past medical history, past social history and problem list.    Review of Systems   Constitutional: Positive for fatigue. Negative for appetite change, chills, fever and unexpected weight change.   HENT: Positive for rhinorrhea and sneezing. Negative for congestion, ear pain, postnasal drip, sinus pressure, sore throat and voice change.    Eyes: Negative for visual disturbance.   Respiratory: Positive for cough, shortness of breath and wheezing.    Cardiovascular: Negative  for chest pain, palpitations and leg swelling.   Gastrointestinal: Negative for abdominal pain, blood in stool, constipation, diarrhea, nausea and vomiting.   Endocrine: Negative for polydipsia and polyuria.   Genitourinary: Negative for difficulty urinating, dysuria, frequency, hematuria and urgency.   Musculoskeletal: Positive for arthralgias and back pain. Negative for joint swelling, myalgias and neck pain.   Skin: Negative for color change.   Neurological: Positive for dizziness and headaches. Negative for tremors, weakness and numbness.   Psychiatric/Behavioral: Negative for dysphoric mood, sleep disturbance and suicidal ideas. The patient is not nervous/anxious.      Objective   Physical Exam   Constitutional: He is oriented to person, place, and time. He appears well-developed and well-nourished. He is cooperative. He does not have a sickly appearance. No distress.   Alert and in fair spirits.  Appeared older than stated age, chronically ill, and pale.  No apparent distress.  No cyanosis, diaphoresis, or jaundice.   HENT:   Head: Atraumatic.   Right Ear: Tympanic membrane, external ear and ear canal normal.   Left Ear: Tympanic membrane, external ear and ear canal normal.   Mouth/Throat: Oropharynx is clear and moist.   Eyes: EOM are normal. Pupils are equal, round, and reactive to light. No scleral icterus.   Neck: No JVD present. Carotid bruit is not present. No tracheal deviation present. No thyromegaly present.   Cardiovascular: Normal rate, regular rhythm, S1 normal, S2 normal, normal heart sounds and intact distal pulses. Exam reveals no gallop.   No murmur heard.  Pulmonary/Chest: No accessory muscle usage. No respiratory distress. He has decreased breath sounds in the right lower field and the left lower field. He has wheezes (diffuse-primarily with forced expiration). He has no rales.   Mild pulmonary hyperinflation   Abdominal: Soft. Normal aorta and bowel sounds are normal. He exhibits no  abdominal bruit and no mass. There is no hepatosplenomegaly. There is no tenderness. No hernia.   Musculoskeletal: He exhibits no deformity.   No peripheral joint redness or warmth   Lymphadenopathy:        Head (right side): No submandibular adenopathy present.        Head (left side): No submandibular adenopathy present.     He has no cervical adenopathy.   Neurological: He is alert and oriented to person, place, and time. He has normal strength and normal reflexes. He displays normal reflexes. A sensory deficit (decreased vibration sense both feet) is present. No cranial nerve deficit. He exhibits normal muscle tone. Coordination normal.   Skin: Skin is warm and dry. No rash noted. He is not diaphoretic. No pallor. Nails show no clubbing.   Psychiatric: He has a normal mood and affect. His behavior is normal.     Assessment/Plan   Problems Addressed this Visit        Cardiovascular and Mediastinum    Essential hypertension   Hypertension: at goal. Evidence of target organ damage: coronary artery disease and heart failure.  Encouraged to continue to work on diet and exercise plan.   Continue current medication    Relevant Orders    Comprehensive Metabolic Panel    Mixed hyperlipidemia   As above.   Continue current medication.    Relevant Orders    Lipid Panel    CAD (coronary artery disease)  Coronary artery disease is stable.  Reminded regarding the importance of lifestyle modification.  Continue current medication  Follow up with cardiology     Chronic systolic congestive heart failure (CMS/HCC)  As above.     Ischemic cardiomyopathy    Relevant Orders    Protime-INR       Respiratory    COPD mixed type (CMS/HCC)   COPD is stable.  Reminded of the importance of smoking cessation  Encouraged to remain as active as symptoms allow for    Chronic seasonal allergic rhinitis       Digestive    Vitamin D deficiency    Gastroesophageal reflux disease without esophagitis    Diverticulosis of large intestine without  hemorrhage       Endocrine    Type 2 diabetes mellitus with peripheral neuropathy (CMS/HCC)  Diabetes mellitus Type II, under poor control.   Encouraged to continue to pursue ADA diet  Encouraged aerobic exercise.    Relevant Orders    Hemoglobin A1c       Nervous and Auditory    Mechanical back pain    Relevant Medications    oxyCODONE (ROXICODONE) 10 MG tablet       Hematopoietic and Hemostatic    Iron deficiency anemia due to chronic blood loss  Recurrent anemia on coumadin for a previous thrombus in the apex of the left ventricle  Will update labs today    Relevant Orders    CBC & Differential    Transferrin    Iron    Ferritin       Other    Chronic anxiety    Smoker    Healthcare maintenance  Patient has already received a flu shot fall.  Encouraged to follow up with shingrix.

## 2018-11-17 VITALS
RESPIRATION RATE: 12 BRPM | SYSTOLIC BLOOD PRESSURE: 115 MMHG | OXYGEN SATURATION: 98 % | TEMPERATURE: 96.7 F | HEIGHT: 69 IN | DIASTOLIC BLOOD PRESSURE: 70 MMHG | HEART RATE: 76 BPM | WEIGHT: 172.2 LBS | BODY MASS INDEX: 25.51 KG/M2

## 2018-11-21 ENCOUNTER — RESULTS ENCOUNTER (OUTPATIENT)
Dept: FAMILY MEDICINE CLINIC | Facility: CLINIC | Age: 68
End: 2018-11-21

## 2018-11-21 DIAGNOSIS — I25.5 ISCHEMIC CARDIOMYOPATHY: ICD-10-CM

## 2018-11-21 DIAGNOSIS — J30.2 CHRONIC SEASONAL ALLERGIC RHINITIS: ICD-10-CM

## 2018-11-21 RX ORDER — MONTELUKAST SODIUM 10 MG/1
10 TABLET ORAL DAILY
Qty: 30 TABLET | Refills: 5 | Status: SHIPPED | OUTPATIENT
Start: 2018-11-21 | End: 2019-08-22 | Stop reason: ALTCHOICE

## 2018-12-04 ENCOUNTER — OFFICE VISIT (OUTPATIENT)
Dept: FAMILY MEDICINE CLINIC | Facility: CLINIC | Age: 68
End: 2018-12-04

## 2018-12-04 VITALS
WEIGHT: 171 LBS | SYSTOLIC BLOOD PRESSURE: 98 MMHG | HEART RATE: 72 BPM | TEMPERATURE: 97.8 F | BODY MASS INDEX: 25.33 KG/M2 | OXYGEN SATURATION: 98 % | HEIGHT: 69 IN | DIASTOLIC BLOOD PRESSURE: 60 MMHG

## 2018-12-04 DIAGNOSIS — I25.5 ISCHEMIC CARDIOMYOPATHY: ICD-10-CM

## 2018-12-04 DIAGNOSIS — M54.9 MECHANICAL BACK PAIN: ICD-10-CM

## 2018-12-04 DIAGNOSIS — K57.30 DIVERTICULOSIS OF LARGE INTESTINE WITHOUT HEMORRHAGE: ICD-10-CM

## 2018-12-04 DIAGNOSIS — F17.200 TOBACCO USE DISORDER: ICD-10-CM

## 2018-12-04 DIAGNOSIS — K21.9 GASTROESOPHAGEAL REFLUX DISEASE WITHOUT ESOPHAGITIS: ICD-10-CM

## 2018-12-04 DIAGNOSIS — I25.10 CORONARY ARTERY DISEASE INVOLVING NATIVE CORONARY ARTERY OF NATIVE HEART WITHOUT ANGINA PECTORIS: ICD-10-CM

## 2018-12-04 DIAGNOSIS — F41.9 CHRONIC ANXIETY: ICD-10-CM

## 2018-12-04 DIAGNOSIS — E78.2 MIXED HYPERLIPIDEMIA: ICD-10-CM

## 2018-12-04 DIAGNOSIS — J30.2 CHRONIC SEASONAL ALLERGIC RHINITIS: ICD-10-CM

## 2018-12-04 DIAGNOSIS — Z00.00 MEDICARE ANNUAL WELLNESS VISIT, SUBSEQUENT: Primary | ICD-10-CM

## 2018-12-04 DIAGNOSIS — E11.42 TYPE 2 DIABETES MELLITUS WITH PERIPHERAL NEUROPATHY (HCC): ICD-10-CM

## 2018-12-04 DIAGNOSIS — Z79.01 LONG TERM CURRENT USE OF ANTICOAGULANT: ICD-10-CM

## 2018-12-04 DIAGNOSIS — J44.9 COPD MIXED TYPE (HCC): ICD-10-CM

## 2018-12-04 DIAGNOSIS — E55.9 VITAMIN D DEFICIENCY: ICD-10-CM

## 2018-12-04 DIAGNOSIS — D50.0 IRON DEFICIENCY ANEMIA DUE TO CHRONIC BLOOD LOSS: ICD-10-CM

## 2018-12-04 DIAGNOSIS — Z79.891 LONG-TERM CURRENT USE OF OPIATE ANALGESIC: ICD-10-CM

## 2018-12-04 DIAGNOSIS — M51.36 BULGING LUMBAR DISC: ICD-10-CM

## 2018-12-04 DIAGNOSIS — I10 ESSENTIAL HYPERTENSION: ICD-10-CM

## 2018-12-04 DIAGNOSIS — I50.22 CHRONIC SYSTOLIC CONGESTIVE HEART FAILURE (HCC): ICD-10-CM

## 2018-12-04 DIAGNOSIS — N52.01 ERECTILE DYSFUNCTION DUE TO ARTERIAL INSUFFICIENCY: ICD-10-CM

## 2018-12-04 PROCEDURE — G0439 PPPS, SUBSEQ VISIT: HCPCS | Performed by: PHYSICIAN ASSISTANT

## 2018-12-04 PROCEDURE — 96160 PT-FOCUSED HLTH RISK ASSMT: CPT | Performed by: PHYSICIAN ASSISTANT

## 2018-12-04 PROCEDURE — 99406 BEHAV CHNG SMOKING 3-10 MIN: CPT | Performed by: PHYSICIAN ASSISTANT

## 2018-12-04 RX ORDER — ONDANSETRON 4 MG/1
4 TABLET, FILM COATED ORAL EVERY 8 HOURS PRN
Qty: 60 TABLET | Refills: 2 | Status: SHIPPED | OUTPATIENT
Start: 2018-12-04 | End: 2019-08-22 | Stop reason: ALTCHOICE

## 2018-12-04 RX ORDER — OXYCODONE HYDROCHLORIDE 10 MG/1
10 TABLET ORAL 4 TIMES DAILY
Qty: 120 TABLET | Refills: 0 | Status: SHIPPED | OUTPATIENT
Start: 2018-12-04 | End: 2019-01-03 | Stop reason: SDUPTHER

## 2018-12-04 NOTE — PROGRESS NOTES
QUICK REFERENCE INFORMATION:  The ABCs of the Annual Wellness Visit    Subsequent Medicare Wellness Visit    HEALTH RISK ASSESSMENT    1950    Recent Hospitalizations:  No hospitalization(s) within the last year..        Current Medical Providers:  Patient Care Team:  Edy Kline MD as PCP - General  Edy Kline MD as PCP - Family Medicine        Smoking Status:  Social History     Tobacco Use   Smoking Status Current Every Day Smoker   • Packs/day: 0.50   • Years: 50.00   • Pack years: 25.00   • Types: Cigarettes   Smokeless Tobacco Never Used       Alcohol Consumption:  Social History     Substance and Sexual Activity   Alcohol Use No       Depression Screen:   PHQ-2/PHQ-9 Depression Screening 12/4/2018   Little interest or pleasure in doing things 0   Feeling down, depressed, or hopeless 2   Trouble falling or staying asleep, or sleeping too much 3   Feeling tired or having little energy 2   Poor appetite or overeating 2   Feeling bad about yourself - or that you are a failure or have let yourself or your family down 0   Trouble concentrating on things, such as reading the newspaper or watching television 3   Moving or speaking so slowly that other people could have noticed. Or the opposite - being so fidgety or restless that you have been moving around a lot more than usual 0   Thoughts that you would be better off dead, or of hurting yourself in some way 3   Total Score 15   If you checked off any problems, how difficult have these problems made it for you to do your work, take care of things at home, or get along with other people? Not difficult at all       Health Habits and Functional and Cognitive Screening:  Functional & Cognitive Status 12/4/2018   Do you have difficulty preparing food and eating? Yes   Do you have difficulty bathing yourself, getting dressed or grooming yourself? No   Do you have difficulty using the toilet? No   Do you have difficulty moving around from  place to place? No   Do you have trouble with steps or getting out of a bed or a chair? Yes   In the past year have you fallen or experienced a near fall? Yes   Current Diet Well Balanced Diet   Dental Exam Not up to date   Eye Exam Up to date   Exercise (times per week) 0 times per week   Current Exercise Activities Include None   Do you need help using the phone?  No   Are you deaf or do you have serious difficulty hearing?  No   Do you need help with transportation? No   Do you need help shopping? Yes   Do you need help preparing meals?  Yes   Do you need help with housework?  Yes   Do you need help with laundry? Yes   Do you need help taking your medications? Yes   Do you need help managing money? No   Do you ever drive or ride in a car without wearing a seat belt? No   Have you felt unusual stress, anger or loneliness in the last month? No   Who do you live with? Spouse   If you need help, do you have trouble finding someone available to you? No   Have you been bothered in the last four weeks by sexual problems? No   Do you have difficulty concentrating, remembering or making decisions? Yes     Does the patient have evidence of cognitive impairment? No    Fall Risk Assessment  Fallen in past 6 months: 5--> Yes  Mental Status: 0--> no mental status change  Mobility: 0--> No mobility issues  Medications: 1--> Narcotics  Total Fall Risk Score: 7    The patient has a history of falls. I did complete a risk assessment for falls. A plan of care for falls was documented.    Aspirin use counseling: Taking ASA appropriately as indicated      Recent Lab Results:  CMP:  Lab Results   Component Value Date     (H) 05/11/2018    BUN 23 (H) 05/11/2018    CREATININE 1.39 (H) 05/11/2018    EGFRIFNONA 51 (L) 05/11/2018    EGFRIFAFRI 62 05/11/2018    BCR 16.5 05/11/2018     05/11/2018    K 4.7 05/11/2018    CO2 26.3 05/11/2018    CALCIUM 10.0 05/11/2018    PROTENTOTREF 7.0 05/11/2018    ALBUMIN 4.70 05/11/2018     LABGLOBREF 2.3 05/11/2018    LABIL2 2.0 05/11/2018    BILITOT 0.2 05/11/2018    ALKPHOS 60 05/11/2018    AST 24 05/11/2018    ALT 37 05/11/2018     Lipid Panel:  Lab Results   Component Value Date    CHOL 305 (H) 04/11/2017    TRIG 309 (H) 05/11/2018    HDL 35 (L) 05/11/2018    VLDL 61.8 05/11/2018    LDLHDL  04/11/2017      Comment:      Unable to calculate     HbA1c:  Lab Results   Component Value Date    HGBA1C 9.30 (H) 05/11/2018       Visual Acuity:   Visual Acuity Screening    Right eye Left eye Both eyes   Without correction: 20/40 20/40 20/30   With correction:        Hearing acuity:  Whisper test  Greater than 5 feet left ear  Greater than 5 feet right ear    Age-appropriate Screening Schedule:  Refer to the list below for future screening recommendations based on patient's age, sex and/or medical conditions. Orders for these recommended tests are listed in the plan section. The patient has been provided with a written plan.    Health Maintenance   Topic Date Due   • ZOSTER VACCINE (2 of 3) 02/26/2014   • DIABETIC FOOT EXAM  07/21/2018   • DIABETIC EYE EXAM  07/21/2018   • URINE MICROALBUMIN  07/21/2018   • HEMOGLOBIN A1C  11/11/2018   • DXA SCAN  07/20/2022 (Originally 6/22/2016)   • PNEUMOCOCCAL VACCINES (65+ LOW/MEDIUM RISK) (2 of 2 - PPSV23) 01/13/2019   • LIPID PANEL  05/11/2019   • TDAP/TD VACCINES (2 - Td) 08/02/2020   • COLONOSCOPY  05/11/2023   • INFLUENZA VACCINE  Completed        Subjective   History of Present Illness    Adria Zuñiga is a 67 y.o. male who presents for an Subsequent Wellness Visit.    Mechanical low back pain back/lumbar disc disease-he complains of moderate low back pain.  Some relief with opioid analgesics with the use of oxycodone.  Onset greater than 5 years ago.    Coronary artery disease-stable with 81 mg aspirin, Lasix, entresto, and ntg prn    Congestive heart failure/ischemic cardiomyopathy-stable with Lasix and entresto    Hypertension-controlled with Lasix, Coreg and  entresto    Hyperlipidemia-improved with Crestor    Chronic allergic rhinitis-stable with Singulair    COPD-not at goal due to continued smoking.    Diverticulitis large intestine-stable with diet    Gastroesophageal reflux disease-controlled with Nexium    Vitamin D deficiency-stable with vitamin D supplementation    Diabetes mellitus type 2-stable with metformin and xultophy    Iron deficiency anemia-stable with iron supplementation    Chronic anxiety-controlled with Cymbalta    Long-term use of anticoagulant-currently on Coumadin which is followed by INR with cardiology.  He denies any abnormal bleeding at this time.  Stable    Erectile dysfunction-stable    Tobacco use disorder-he smokes one half pack per day for 30+ years.  He is not interested in smoking cessation at this time      The following portions of the patient's history were reviewed and updated as appropriate: allergies, current medications, past family history, past medical history, past social history, past surgical history and problem list.    Outpatient Medications Prior to Visit   Medication Sig Dispense Refill   • albuterol (PROVENTIL HFA;VENTOLIN HFA) 108 (90 Base) MCG/ACT inhaler Inhale 2 puffs Every 6 (Six) Hours As Needed for Wheezing. 6.7 g 2   • aspirin 81 MG chewable tablet Chew 1 tablet daily. 30 tablet 5   • JEAN CONTOUR NEXT TEST test strip      • Blood Glucose Monitoring Suppl (TRUE METRIX METER) w/Device kit USE AS DIRECTED TO TEST BLOOD SUGAR FOUR TIMES DAILY AS DIRECTED  0   • carvedilol (COREG) 12.5 MG tablet Take 1 tablet by mouth 2 (Two) Times a Day With Meals. 60 tablet 5   • DULoxetine (CYMBALTA) 60 MG capsule Take 1 capsule by mouth Daily. 30 capsule 5   • EASY COMFORT LANCETS misc USE TO TEST BLOOD SUGAR FOUR TIMES DAILY AS DIRECTED  2   • esomeprazole (nexIUM) 40 MG capsule Take 1 capsule by mouth Daily. 30 capsule 5   • ferrous sulfate 325 (65 FE) MG tablet Take 1 tablet by mouth 3 (Three) Times a Day With Meals. 90  "tablet 5   • furosemide (LASIX) 40 MG tablet Take 1 tablet by mouth Every Morning. 30 tablet 2   • Insulin Pen Needle 32G X 4 MM misc 1 each 4 (Four) Times a Day. 120 each 5   • Insulin Syringe 28G X 1/2\" 0.5 ML misc 2 (two) times a day.     • Lancet Device misc daily.     • metFORMIN (GLUCOPHAGE) 1000 MG tablet Take 1 tablet by mouth 2 (Two) Times a Day With Meals. 60 tablet 5   • montelukast (SINGULAIR) 10 MG tablet Take 1 tablet by mouth Daily. 30 tablet 5   • nitroglycerin (NITROSTAT) 0.4 MG SL tablet Place 1 tablet under the tongue Every 5 (Five) Minutes As Needed for Chest Pain. Take no more than 3 doses in 15 minutes. 28 tablet 12   • promethazine (PHENERGAN) 25 MG tablet Take 1 tablet by mouth Every 6 (Six) Hours As Needed for Nausea or Vomiting. 60 tablet 1   • rosuvastatin (CRESTOR) 20 MG tablet Take 1 tablet by mouth Daily. 30 tablet 11   • sacubitril-valsartan (ENTRESTO) 49-51 MG tablet Take 1 tablet by mouth Every 12 (Twelve) Hours. 60 tablet 5   • warfarin (COUMADIN) 4 MG tablet Take 1 tablet by mouth Daily. 30 tablet 5   • XULTOPHY 100-3.6 UNIT-MG/ML solution pen-injector INJECT 50 UNITS SUBCUTANEOUSLY EVERY DAY 15 mL 5   • oxyCODONE (ROXICODONE) 10 MG tablet Take 1 tablet by mouth 4 (Four) Times a Day. 120 tablet 0   • warfarin (COUMADIN) 1 MG tablet Take 1 tablet by mouth Daily. 30 tablet 5   • BESIVANCE 0.6 % suspension ophthalmic suspension PLACE 1 drop IN surgical EYE THREE TIMES DAILY AS DIRECTED  1   • DUREZOL 0.05 % ophthalmic emulsion PLACE 1 drop IN surgical EYE THREE TIMES DAILY AS DIRECTED  1   • ILEVRO 0.3 % suspension PLACE 1 drop IN surgical EYE ONCE DAILY AS DIRECTED  1     No facility-administered medications prior to visit.        Patient Active Problem List   Diagnosis   • Essential hypertension   • Mixed hyperlipidemia   • CAD (coronary artery disease)   • Chronic systolic congestive heart failure (CMS/HCC)   • COPD mixed type (CMS/HCC)   • Type 2 diabetes mellitus with peripheral " neuropathy (CMS/HCC)   • Vitamin D deficiency   • Mechanical back pain   • Gastroesophageal reflux disease without esophagitis   • Chronic seasonal allergic rhinitis   • Chronic anxiety   • Diverticulosis of large intestine without hemorrhage   • Vasculogenic erectile dysfunction   • Tobacco use disorder   • Healthcare maintenance   • Iron deficiency anemia due to chronic blood loss   • Long term current use of anticoagulant   • Ischemic cardiomyopathy   • Bulging lumbar disc       Advance Care Planning:  has NO advance directive - information provided to the patient today    Identification of Risk Factors:  Risk factors include: weight , cardiovascular risk, tobacco use, chronic pain, hearing limitations and polypharmacy.    Review of Systems   Constitutional: Negative for activity change, appetite change and fever.   HENT: Negative for ear pain, sinus pressure and sore throat.    Eyes: Negative for pain and visual disturbance.   Respiratory: Negative for cough and chest tightness.    Cardiovascular: Negative for chest pain and palpitations.   Gastrointestinal: Negative for abdominal pain, constipation, diarrhea, nausea and vomiting.   Endocrine: Negative for polydipsia and polyuria.   Genitourinary: Negative for dysuria and frequency.   Musculoskeletal: Positive for arthralgias and back pain. Negative for myalgias.   Skin: Negative for color change and rash.   Allergic/Immunologic: Negative for food allergies and immunocompromised state.   Neurological: Negative for dizziness, syncope and headaches.   Hematological: Negative for adenopathy. Does not bruise/bleed easily.   Psychiatric/Behavioral: Negative for hallucinations and suicidal ideas. The patient is not nervous/anxious.        Compared to one year ago, the patient feels his physical health is worse.  Compared to one year ago, the patient feels his mental health is the same.    Objective     Physical Exam   Constitutional: He is oriented to person, place,  "and time. He appears well-developed and well-nourished.   HENT:   Head: Normocephalic and atraumatic.   Nose: Nose normal.   Mouth/Throat: Oropharynx is clear and moist.   Eyes: Conjunctivae and EOM are normal. Pupils are equal, round, and reactive to light.   Neck: Normal range of motion. Neck supple. No tracheal deviation present. No thyromegaly present.   Cardiovascular: Normal rate, regular rhythm, normal heart sounds and intact distal pulses.   No murmur heard.  Pulmonary/Chest: Effort normal and breath sounds normal. No respiratory distress. He has no wheezes.   Abdominal: Soft. Bowel sounds are normal. There is no tenderness. There is no guarding.   Musculoskeletal: He exhibits tenderness. He exhibits no edema.   Decreased range of motion with lumbar flexion.  Diffuse tenderness noted to lumbar paraspinal musculature.   Lymphadenopathy:     He has no cervical adenopathy.   Neurological: He is alert and oriented to person, place, and time.   Skin: Skin is warm and dry. No rash noted.   Psychiatric: He has a normal mood and affect. His behavior is normal.   Nursing note and vitals reviewed.      Vitals:    12/04/18 1446   BP: 98/60   Pulse: 72   Temp: 97.8 °F (36.6 °C)   TempSrc: Oral   SpO2: 98%   Weight: 77.6 kg (171 lb)   Height: 174 cm (68.5\")   PainSc:   6   PainLoc: Back       Patient's Body mass index is 25.62 kg/m². BMI is within normal parameters. No follow-up required.      Assessment/Plan   Patient Self-Management and Personalized Health Advice  The patient has been provided with information about: diet, tobacco cessation and designing advance directives and preventive services including:   · Advance directive, Exercise counseling provided, Fall Risk assessment done, Nutrition counseling provided.    Visit Diagnoses:    ICD-10-CM ICD-9-CM   1. Medicare annual wellness visit, subsequent Z00.00 V70.0   2. Coronary artery disease involving native coronary artery of native heart without angina pectoris " I25.10 414.01   3. Mechanical back pain M54.9 724.5   4. Chronic systolic congestive heart failure (CMS/HCC) I50.22 428.22     428.0   5. Essential hypertension I10 401.9   6. Ischemic cardiomyopathy I25.5 414.8   7. Mixed hyperlipidemia E78.2 272.2   8. Chronic seasonal allergic rhinitis J30.2 477.8   9. COPD mixed type (CMS/[facility]) J44.9 496   10. Diverticulosis of large intestine without hemorrhage K57.30 562.10   11. Gastroesophageal reflux disease without esophagitis K21.9 530.81   12. Vitamin D deficiency E55.9 268.9   13. Type 2 diabetes mellitus with peripheral neuropathy (CMS/[facility]) E11.42 250.60     357.2   14. Bulging lumbar disc M51.26 722.10   15. Iron deficiency anemia due to chronic blood loss D50.0 280.0   16. Chronic anxiety F41.9 300.00   17. Long term current use of anticoagulant Z79.01 V58.61   18. Erectile dysfunction due to arterial insufficiency N52.01 607.84   19. Tobacco use disorder F17.200 305.1   20. Long-term current use of opiate analgesic Z79.891 V58.69       Orders Placed This Encounter   Procedures   • Urine Drug Screen - Urine, Clean Catch     Standing Status:   Future       Outpatient Encounter Medications as of 12/4/2018   Medication Sig Dispense Refill   • albuterol (PROVENTIL HFA;VENTOLIN HFA) 108 (90 Base) MCG/ACT inhaler Inhale 2 puffs Every 6 (Six) Hours As Needed for Wheezing. 6.7 g 2   • aspirin 81 MG chewable tablet Chew 1 tablet daily. 30 tablet 5   • JEAN CONTOUR NEXT TEST test strip      • Blood Glucose Monitoring Suppl (TRUE METRIX METER) w/Device kit USE AS DIRECTED TO TEST BLOOD SUGAR FOUR TIMES DAILY AS DIRECTED  0   • carvedilol (COREG) 12.5 MG tablet Take 1 tablet by mouth 2 (Two) Times a Day With Meals. 60 tablet 5   • DULoxetine (CYMBALTA) 60 MG capsule Take 1 capsule by mouth Daily. 30 capsule 5   • EASY COMFORT LANCETS misc USE TO TEST BLOOD SUGAR FOUR TIMES DAILY AS DIRECTED  2   • esomeprazole (nexIUM) 40 MG capsule Take 1 capsule by mouth Daily. 30 capsule 5  "  • ferrous sulfate 325 (65 FE) MG tablet Take 1 tablet by mouth 3 (Three) Times a Day With Meals. 90 tablet 5   • furosemide (LASIX) 40 MG tablet Take 1 tablet by mouth Every Morning. 30 tablet 2   • Insulin Pen Needle 32G X 4 MM misc 1 each 4 (Four) Times a Day. 120 each 5   • Insulin Syringe 28G X 1/2\" 0.5 ML misc 2 (two) times a day.     • Lancet Device misc daily.     • metFORMIN (GLUCOPHAGE) 1000 MG tablet Take 1 tablet by mouth 2 (Two) Times a Day With Meals. 60 tablet 5   • montelukast (SINGULAIR) 10 MG tablet Take 1 tablet by mouth Daily. 30 tablet 5   • nitroglycerin (NITROSTAT) 0.4 MG SL tablet Place 1 tablet under the tongue Every 5 (Five) Minutes As Needed for Chest Pain. Take no more than 3 doses in 15 minutes. 28 tablet 12   • promethazine (PHENERGAN) 25 MG tablet Take 1 tablet by mouth Every 6 (Six) Hours As Needed for Nausea or Vomiting. 60 tablet 1   • rosuvastatin (CRESTOR) 20 MG tablet Take 1 tablet by mouth Daily. 30 tablet 11   • sacubitril-valsartan (ENTRESTO) 49-51 MG tablet Take 1 tablet by mouth Every 12 (Twelve) Hours. 60 tablet 5   • warfarin (COUMADIN) 4 MG tablet Take 1 tablet by mouth Daily. 30 tablet 5   • XULTOPHY 100-3.6 UNIT-MG/ML solution pen-injector INJECT 50 UNITS SUBCUTANEOUSLY EVERY DAY 15 mL 5   • [DISCONTINUED] oxyCODONE (ROXICODONE) 10 MG tablet Take 1 tablet by mouth 4 (Four) Times a Day. 120 tablet 0   • ondansetron (ZOFRAN) 4 MG tablet Take 1 tablet by mouth Every 8 (Eight) Hours As Needed for Nausea or Vomiting. 60 tablet 2   • warfarin (COUMADIN) 1 MG tablet Take 1 tablet by mouth Daily. 30 tablet 5   • [DISCONTINUED] BESIVANCE 0.6 % suspension ophthalmic suspension PLACE 1 drop IN surgical EYE THREE TIMES DAILY AS DIRECTED  1   • [DISCONTINUED] DUREZOL 0.05 % ophthalmic emulsion PLACE 1 drop IN surgical EYE THREE TIMES DAILY AS DIRECTED  1   • [DISCONTINUED] ILEVRO 0.3 % suspension PLACE 1 drop IN surgical EYE ONCE DAILY AS DIRECTED  1     No facility-administered " encounter medications on file as of 12/4/2018.      I advised Adria of the risks of continuing to use tobacco, and I provided him with tobacco cessation educational materials in the After Visit Summary.     During this visit, I spent 3 minutes counseling the patient regarding tobacco cessation.    Electronic prescription sent to his pharmacy for oxycodone 10 mg 1 tablet 4 times daily #120 with no refills.  Written by Dr. Edy Kirk #[id number] reviewed and is consistent  UDS 9/11/18 reviewed and is consistent    Reviewed use of high risk medication in the elderly: yes  Reviewed for potential of harmful drug interactions in the elderly: yes    Follow Up:  Return in about 4 weeks (around 1/1/2019) for Tuesday with Megan.     An After Visit Summary and PPPS with all of these plans were given to the patient.

## 2018-12-04 NOTE — PATIENT INSTRUCTIONS
Hearing Loss  Hearing loss is a partial or total loss of the ability to hear. This can be temporary or permanent, and it can happen in one or both ears. Hearing loss may be referred to as deafness.  Medical care is necessary to treat hearing loss properly and to prevent the condition from getting worse. Your hearing may partially or completely come back, depending on what caused your hearing loss and how severe it is. In some cases, hearing loss is permanent.  What are the causes?  Common causes of hearing loss include:  · Too much wax in the ear canal.  · Infection of the ear canal or middle ear.  · Fluid in the middle ear.  · Injury to the ear or surrounding area.  · An object stuck in the ear.  · Prolonged exposure to loud sounds, such as music.    Less common causes of hearing loss include:  · Tumors in the ear.  · Viral or bacterial infections, such as meningitis.  · A hole in the eardrum (perforated eardrum).  · Problems with the hearing nerve that sends signals between the brain and the ear.  · Certain medicines.    What are the signs or symptoms?  Symptoms of this condition may include:  · Difficulty telling the difference between sounds.  · Difficulty following a conversation when there is background noise.  · Lack of response to sounds in your environment. This may be most noticeable when you do not respond to startling sounds.  · Needing to turn up the volume on the television, radio, etc.  · Ringing in the ears.  · Dizziness.  · Pain in the ears.    How is this diagnosed?  This condition is diagnosed based on a physical exam and a hearing test (audiometry). The audiometry test will be performed by a hearing specialist (audiologist). You may also be referred to an ear, nose, and throat (ENT) specialist (otolaryngologist).  How is this treated?  Treatment for recent onset of hearing loss may include:  · Ear wax removal.  · Being prescribed medicines to prevent infection (antibiotics).  · Being prescribed  medicines to reduce inflammation (corticosteroids).    Follow these instructions at home:  · If you were prescribed an antibiotic medicine, take it as told by your health care provider. Do not stop taking the antibiotic even if you start to feel better.  · Take over-the-counter and prescription medicines only as told by your health care provider.  · Avoid loud noises.  · Return to your normal activities as told by your health care provider. Ask your health care provider what activities are safe for you.  · Keep all follow-up visits as told by your health care provider. This is important.  Contact a health care provider if:  · You feel dizzy.  · You develop new symptoms.  · You vomit or feel nauseous.  · You have a fever.  Get help right away if:  · You develop sudden changes in your vision.  · You have severe ear pain.  · You have new or increased weakness.  · You have a severe headache.  This information is not intended to replace advice given to you by your health care provider. Make sure you discuss any questions you have with your health care provider.  Document Released: 12/18/2006 Document Revised: 05/25/2017 Document Reviewed: 05/04/2016  [employer] Interactive Patient Education © 2018 [employer]  Advance Directive  Advance directives are legal documents that let you make choices ahead of time about your health care and medical treatment in case you become unable to communicate for yourself. Advance directives are a way for you to communicate your wishes to family, friends, and health care providers. This can help convey your decisions about end-of-life care if you become unable to communicate.  Discussing and writing advance directives should happen over time rather than all at once. Advance directives can be changed depending on your situation and what you want, even after you have signed the advance directives.  If you do not have an advance directive, some states assign family decision makers to act on  your behalf based on how closely you are related to them. Each state has its own laws regarding advance directives. You may want to check with your health care provider, , or state representative about the laws in your state. There are different types of advance directives, such as:  · Medical power of .  · Living will.  · Do not resuscitate (DNR) or do not attempt resuscitation (DNAR) order.    Health care proxy and medical power of   A health care proxy, also called a health care agent, is a person who is appointed to make medical decisions for you in cases in which you are unable to make the decisions yourself. Generally, people choose someone they know well and trust to represent their preferences. Make sure to ask this person for an agreement to act as your proxy. A proxy may have to exercise judgment in the event of a medical decision for which your wishes are not known.  A medical power of  is a legal document that names your health care proxy. Depending on the laws in your state, after the document is written, it may also need to be:  · Signed.  · Notarized.  · Dated.  · Copied.  · Witnessed.  · Incorporated into your medical record.    You may also want to appoint someone to manage your financial affairs in a situation in which you are unable to do so. This is called a durable power of  for finances. It is a separate legal document from the durable power of  for health care. You may choose the same person or someone different from your health care proxy to act as your agent in financial matters.  If you do not appoint a proxy, or if there is a concern that the proxy is not acting in your best interests, a court-appointed guardian may be designated to act on your behalf.  Living will  A living will is a set of instructions documenting your wishes about medical care when you cannot express them yourself. Health care providers should keep a copy of your living  will in your medical record. You may want to give a copy to family members or friends. To alert caregivers in case of an emergency, you can place a card in your wallet to let them know that you have a living will and where they can find it. A living will is used if you become:  · Terminally ill.  · Incapacitated.  · Unable to communicate or make decisions.    Items to consider in your living will include:  · The use or non-use of life-sustaining equipment, such as dialysis machines and breathing machines (ventilators).  · A DNR or DNAR order, which is the instruction not to use cardiopulmonary resuscitation (CPR) if breathing or heartbeat stops.  · The use or non-use of tube feeding.  · Withholding of food and fluids.  · Comfort (palliative) care when the goal becomes comfort rather than a cure.  · Organ and tissue donation.    A living will does not give instructions for distributing your money and property if you should pass away. It is recommended that you seek the advice of a  when writing a will. Decisions about taxes, beneficiaries, and asset distribution will be legally binding. This process can relieve your family and friends of any concerns surrounding disputes or questions that may come up about the distribution of your assets.  DNR or DNAR  A DNR or DNAR order is a request not to have CPR in the event that your heart stops beating or you stop breathing. If a DNR or DNAR order has not been made and shared, a health care provider will try to help any patient whose heart has stopped or who has stopped breathing. If you plan to have surgery, talk with your health care provider about how your DNR or DNAR order will be followed if problems occur.  Summary  · Advance directives are the legal documents that allow you to make choices ahead of time about your health care and medical treatment in case you become unable to communicate for yourself.  · The process of discussing and writing advance directives  "should happen over time. You can change the advance directives, even after you have signed them.  · Advance directives include DNR or DNAR orders, living leiva, and designating an agent as your medical power of .  This information is not intended to replace advice given to you by your health care provider. Make sure you discuss any questions you have with your health care provider.  Document Released: 03/26/2009 Document Revised: 11/06/2017 Document Reviewed: 11/06/2017  [employer] Interactive Patient Education © 2017 [employer]  Fat and Cholesterol Restricted Diet  Getting too much fat and cholesterol in your diet may cause health problems. Following this diet helps keep your fat and cholesterol at normal levels. This can keep you from getting sick.  What types of fat should I choose?  · Choose monosaturated and polyunsaturated fats. These are found in foods such as olive oil, canola oil, flaxseeds, walnuts, almonds, and seeds.  · Eat more omega-3 fats. Good choices include salmon, mackerel, sardines, tuna, flaxseed oil, and ground flaxseeds.  · Limit saturated fats. These are in animal products such as meats, butter, and cream. They can also be in plant products such as palm oil, palm kernel oil, and coconut oil.  · Avoid foods with partially hydrogenated oils in them. These contain trans fats. Examples of foods that have trans fats are stick margarine, some tub margarines, cookies, crackers, and other baked goods.  What general guidelines do I need to follow?  · Check food labels. Look for the words \"trans fat\" and \"saturated fat.\"  · When preparing a meal:  ? Fill half of your plate with vegetables and green salads.  ? Fill one fourth of your plate with whole grains. Look for the word \"whole\" as the first word in the ingredient list.  ? Fill one fourth of your plate with lean protein foods.  · Eat more foods that have fiber, like apples, carrots, beans, peas, and barley.  · Eat more home-cooked foods. " Eat less at restaurants and buffets.  · Limit or avoid alcohol.  · Limit foods high in starch and sugar.  · Limit fried foods.  · Cook foods without frying them. Baking, boiling, grilling, and broiling are all great options.  · Lose weight if you are overweight. Losing even a small amount of weight can help your overall health. It can also help prevent diseases such as diabetes and heart disease.  What foods can I eat?  Grains  Whole grains, such as whole wheat or whole grain breads, crackers, cereals, and pasta. Unsweetened oatmeal, bulgur, barley, quinoa, or brown rice. Corn or whole wheat flour tortillas.  Vegetables  Fresh or frozen vegetables (raw, steamed, roasted, or grilled). Green salads.  Fruits  All fresh, canned (in natural juice), or frozen fruits.  Meat and Other Protein Products  Ground beef (85% or leaner), grass-fed beef, or beef trimmed of fat. Skinless chicken or turkey. Ground chicken or turkey. Pork trimmed of fat. All fish and seafood. Eggs. Dried beans, peas, or lentils. Unsalted nuts or seeds. Unsalted canned or dry beans.  Dairy  Low-fat dairy products, such as skim or 1% milk, 2% or reduced-fat cheeses, low-fat ricotta or cottage cheese, or plain low-fat yogurt.  Fats and Oils  Tub margarines without trans fats. Light or reduced-fat mayonnaise and salad dressings. Avocado. Olive, canola, sesame, or safflower oils. Natural peanut or almond butter (choose ones without added sugar and oil).  The items listed above may not be a complete list of recommended foods or beverages. Contact your dietitian for more options.  What foods are not recommended?  Grains  White bread. White pasta. White rice. Cornbread. Bagels, pastries, and croissants. Crackers that contain trans fat.  Vegetables  White potatoes. Corn. Creamed or fried vegetables. Vegetables in a cheese sauce.  Fruits  Dried fruits. Canned fruit in light or heavy syrup. Fruit juice.  Meat and Other Protein Products  Fatty cuts of meat. Ribs,  chicken wings, barry, sausage, bologna, salami, chitterlings, fatback, hot dogs, bratwurst, and packaged luncheon meats. Liver and organ meats.  Dairy  Whole or 2% milk, cream, half-and-half, and cream cheese. Whole milk cheeses. Whole-fat or sweetened yogurt. Full-fat cheeses. Nondairy creamers and whipped toppings. Processed cheese, cheese spreads, or cheese curds.  Sweets and Desserts  Corn syrup, sugars, honey, and molasses. Candy. Jam and jelly. Syrup. Sweetened cereals. Cookies, pies, cakes, donuts, muffins, and ice cream.  Fats and Oils  Butter, stick margarine, lard, shortening, ghee, or barry fat. Coconut, palm kernel, or palm oils.  Beverages  Alcohol. Sweetened drinks (such as sodas, lemonade, and fruit drinks or punches).  The items listed above may not be a complete list of foods and beverages to avoid. Contact your dietitian for more information.  This information is not intended to replace advice given to you by your health care provider. Make sure you discuss any questions you have with your health care provider.  Document Released: 06/18/2013 Document Revised: 08/24/2017 Document Reviewed: 03/19/2015  [employer] Interactive Patient Education © 2018 [employer]  Smoking Tobacco Information  Smoking tobacco will very likely harm your health. Tobacco contains a poisonous (toxic), colorless chemical called nicotine. Nicotine affects the brain and makes tobacco addictive. This change in your brain can make it hard to stop smoking. Tobacco also has other toxic chemicals that can hurt your body and raise your risk of many cancers.  How can smoking tobacco affect me?  Smoking tobacco can increase your chances of having serious health conditions, such as:  · Cancer. Smoking is most commonly associated with lung cancer, but can lead to cancer in other parts of the body.  · Chronic obstructive pulmonary disease (COPD). This is a long-term lung condition that makes it hard to breathe. It also gets worse over  time.  · High blood pressure (hypertension), heart disease, stroke, or heart attack.  · Lung infections, such as pneumonia.  · Cataracts. This is when the lenses in the eyes become clouded.  · Digestive problems. This may include peptic ulcers, heartburn, and gastroesophageal reflux disease (GERD).  · Oral health problems, such as gum disease and tooth loss.  · Loss of taste and smell.    Smoking can affect your appearance by causing:  · Wrinkles.  · Yellow or stained teeth, fingers, and fingernails.    Smoking tobacco can also affect your social life.  · Many workplaces, restaurants, hotels, and public places are tobacco-free. This means that you may experience challenges in finding places to smoke when away from home.  · The cost of a smoking habit can be expensive. Expenses for someone who smokes come in two ways:  ? You spend money on a regular basis to buy tobacco.  ? Your health care costs in the long-term are higher if you smoke.  · Tobacco smoke can also affect the health of those around you. Children of smokers have greater chances of:  ? Sudden infant death syndrome (SIDS).  ? Ear infections.  ? Lung infections.    What lifestyle changes can be made?  · Do not start smoking. Quit if you already do.  · To quit smoking:  ? Make a plan to quit smoking and commit yourself to it. Look for programs to help you and ask your health care provider for recommendations and ideas.  ? Talk with your health care provider about using nicotine replacement medicines to help you quit. Medicine replacement medicines include gum, lozenges, patches, sprays, or pills.  ? Do not replace cigarette smoking with electronic cigarettes, which are commonly called e-cigarettes. The safety of e-cigarettes is not known, and some may contain harmful chemicals.  ? Avoid places, people, or situations that tempt you to smoke.  ? If you try to quit but return to smoking, don't give up hope. It is very common for people to try a number of times  before they fully succeed. When you feel ready again, give it another try.  · Quitting smoking might affect the way you eat as well as your weight. Be prepared to monitor your eating habits. Get support in planning and following a healthy diet.  · Ask your health care provider about having regular tests (screenings) to check for cancer. This may include blood tests, imaging tests, and other tests.  · Exercise regularly. Consider taking walks, joining a gym, or doing yoga or exercise classes.  · Develop skills to manage your stress. These skills include meditation.  What are the benefits of quitting smoking?  By quitting smoking, you may:  · Lower your risk of getting cancer and other diseases caused by smoking.  · Live longer.  · Breathe better.  · Lower your blood pressure and heart rate.  · Stop your addiction to tobacco.  · Stop creating secondhand smoke that hurts other people.  · Improve your sense of taste and smell.  · Look better over time, due to having fewer wrinkles and less staining.    What can happen if changes are not made?  If you do not stop smoking, you may:  · Get cancer and other diseases.  · Develop COPD or other long-term (chronic) lung conditions.  · Develop serious problems with your heart and blood vessels (cardiovascular system).  · Need more tests to screen for problems caused by smoking.  · Have higher, long-term healthcare costs from medicines or treatments related to smoking.  · Continue to have worsening changes in your lungs, mouth, and nose.    Where to find support:  To get support to quit smoking, consider:  · Asking your health care provider for more information and resources.  · Taking classes to learn more about quitting smoking.  · Looking for local organizations that offer resources about quitting smoking.  · Joining a support group for people who want to quit smoking in your local community.    Where to find more information:  You may find more information about quitting  smoking from:  · HelpGuide.org: www.helpguide.org/articles/addictions/how-to-quit-smoking.htm  · Smokefree.gov: smokefree.gov  · American Lung Association: www.lung.org    Contact a health care provider if:  · You have problems breathing.  · Your lips, nose, or fingers turn blue.  · You have chest pain.  · You are coughing up blood.  · You feel faint or you pass out.  · You have other noticeable changes that cause you to worry.  Summary  · Smoking tobacco can negatively affect your health, the health of those around you, your finances, and your social life.  · Do not start smoking. Quit if you already do. If you need help quitting, ask your health care provider.  · Think about joining a support group for people who want to quit smoking in your local community. There are many effective programs that will help you to quit this behavior.  This information is not intended to replace advice given to you by your health care provider. Make sure you discuss any questions you have with your health care provider.  Document Released: 01/02/2018 Document Revised: 01/02/2018 Document Reviewed: 01/02/2018  [employer] Interactive Patient Education © 2018 [employer]  Advance Directive  Advance directives are legal documents that let you make choices ahead of time about your health care and medical treatment in case you become unable to communicate for yourself. Advance directives are a way for you to communicate your wishes to family, friends, and health care providers. This can help convey your decisions about end-of-life care if you become unable to communicate.  Discussing and writing advance directives should happen over time rather than all at once. Advance directives can be changed depending on your situation and what you want, even after you have signed the advance directives.  If you do not have an advance directive, some states assign family decision makers to act on your behalf based on how closely you are related to  them. Each state has its own laws regarding advance directives. You may want to check with your health care provider, , or state representative about the laws in your state. There are different types of advance directives, such as:  · Medical power of .  · Living will.  · Do not resuscitate (DNR) or do not attempt resuscitation (DNAR) order.    Health care proxy and medical power of   A health care proxy, also called a health care agent, is a person who is appointed to make medical decisions for you in cases in which you are unable to make the decisions yourself. Generally, people choose someone they know well and trust to represent their preferences. Make sure to ask this person for an agreement to act as your proxy. A proxy may have to exercise judgment in the event of a medical decision for which your wishes are not known.  A medical power of  is a legal document that names your health care proxy. Depending on the laws in your state, after the document is written, it may also need to be:  · Signed.  · Notarized.  · Dated.  · Copied.  · Witnessed.  · Incorporated into your medical record.    You may also want to appoint someone to manage your financial affairs in a situation in which you are unable to do so. This is called a durable power of  for finances. It is a separate legal document from the durable power of  for health care. You may choose the same person or someone different from your health care proxy to act as your agent in financial matters.  If you do not appoint a proxy, or if there is a concern that the proxy is not acting in your best interests, a court-appointed guardian may be designated to act on your behalf.  Living will  A living will is a set of instructions documenting your wishes about medical care when you cannot express them yourself. Health care providers should keep a copy of your living will in your medical record. You may want to give a  copy to family members or friends. To alert caregivers in case of an emergency, you can place a card in your wallet to let them know that you have a living will and where they can find it. A living will is used if you become:  · Terminally ill.  · Incapacitated.  · Unable to communicate or make decisions.    Items to consider in your living will include:  · The use or non-use of life-sustaining equipment, such as dialysis machines and breathing machines (ventilators).  · A DNR or DNAR order, which is the instruction not to use cardiopulmonary resuscitation (CPR) if breathing or heartbeat stops.  · The use or non-use of tube feeding.  · Withholding of food and fluids.  · Comfort (palliative) care when the goal becomes comfort rather than a cure.  · Organ and tissue donation.    A living will does not give instructions for distributing your money and property if you should pass away. It is recommended that you seek the advice of a  when writing a will. Decisions about taxes, beneficiaries, and asset distribution will be legally binding. This process can relieve your family and friends of any concerns surrounding disputes or questions that may come up about the distribution of your assets.  DNR or DNAR  A DNR or DNAR order is a request not to have CPR in the event that your heart stops beating or you stop breathing. If a DNR or DNAR order has not been made and shared, a health care provider will try to help any patient whose heart has stopped or who has stopped breathing. If you plan to have surgery, talk with your health care provider about how your DNR or DNAR order will be followed if problems occur.  Summary  · Advance directives are the legal documents that allow you to make choices ahead of time about your health care and medical treatment in case you become unable to communicate for yourself.  · The process of discussing and writing advance directives should happen over time. You can change the advance  directives, even after you have signed them.  · Advance directives include DNR or DNAR orders, living leiva, and designating an agent as your medical power of .  This information is not intended to replace advice given to you by your health care provider. Make sure you discuss any questions you have with your health care provider.  Document Released: 2009 Document Revised: 2017 Document Reviewed: 2017  [employer] Interactive Patient Education ©  [employer]    Medicare Wellness  Personal Prevention Plan of Service     Date of Office Visit:  2018  Encounter Provider:  SHIRA Maravilla  Place of Service:  [facility]  Patient Name: Adria Zuñiga  :  1950    As part of the Medicare Wellness portion of your visit today, we are providing you with this personalized preventive plan of services (PPPS). This plan is based upon recommendations of the [place] Preventive Services Task Force (USPSTF) and the Advisory Committee on Immunization Practices (ACIP).    This lists the preventive care services that should be considered, and provides dates of when you are due. Items listed as completed are up-to-date and do not require any further intervention.    Health Maintenance   Topic Date Due   • HEPATITIS A VACCINE ADULT (1 of 2) 1969   • ZOSTER VACCINE (2 of 3) 2014   • MEDICARE ANNUAL WELLNESS  2018   • DIABETIC FOOT EXAM  2018   • DIABETIC EYE EXAM  2018   • URINE MICROALBUMIN  2018   • HEMOGLOBIN A1C  2018   • DXA SCAN  2022 (Originally 2016)   • PNEUMOCOCCAL VACCINES (65+ LOW/MEDIUM RISK) (2 of 2 - PPSV23) 2019   • LIPID PANEL  2019   • TDAP/TD VACCINES (2 - Td) 2020   • COLONOSCOPY  2023   • HEPATITIS C SCREENING  Completed   • INFLUENZA VACCINE  Completed   • AAA SCREEN (ONE-TIME)  Completed       No orders of the defined types were placed in this  encounter.      No Follow-up on file.   Fall Prevention in the Home  Falls can cause injuries. They can happen to people of all ages. There are many things you can do to make your home safe and to help prevent falls.  What can I do on the outside of my home?  · Regularly fix the edges of walkways and driveways and fix any cracks.  · Remove anything that might make you trip as you walk through a door, such as a raised step or threshold.  · Trim any bushes or trees on the path to your home.  · Use bright outdoor lighting.  · Clear any walking paths of anything that might make someone trip, such as rocks or tools.  · Regularly check to see if handrails are loose or broken. Make sure that both sides of any steps have handrails.  · Any raised decks and porches should have guardrails on the edges.  · Have any leaves, snow, or ice cleared regularly.  · Use sand or salt on walking paths during winter.  · Clean up any spills in your garage right away. This includes oil or grease spills.  What can I do in the bathroom?  · Use night lights.  · Install grab bars by the toilet and in the tub and shower. Do not use towel bars as grab bars.  · Use non-skid mats or decals in the tub or shower.  · If you need to sit down in the shower, use a plastic, non-slip stool.  · Keep the floor dry. Clean up any water that spills on the floor as soon as it happens.  · Remove soap buildup in the tub or shower regularly.  · Attach bath mats securely with double-sided non-slip rug tape.  · Do not have throw rugs and other things on the floor that can make you trip.  What can I do in the bedroom?  · Use night lights.  · Make sure that you have a light by your bed that is easy to reach.  · Do not use any sheets or blankets that are too big for your bed. They should not hang down onto the floor.  · Have a firm chair that has side arms. You can use this for support while you get dressed.  · Do not have throw rugs and other things on the floor that  can make you trip.  What can I do in the kitchen?  · Clean up any spills right away.  · Avoid walking on wet floors.  · Keep items that you use a lot in easy-to-reach places.  · If you need to reach something above you, use a strong step stool that has a grab bar.  · Keep electrical cords out of the way.  · Do not use floor polish or wax that makes floors slippery. If you must use wax, use non-skid floor wax.  · Do not have throw rugs and other things on the floor that can make you trip.  What can I do with my stairs?  · Do not leave any items on the stairs.  · Make sure that there are handrails on both sides of the stairs and use them. Fix handrails that are broken or loose. Make sure that handrails are as long as the stairways.  · Check any carpeting to make sure that it is firmly attached to the stairs. Fix any carpet that is loose or worn.  · Avoid having throw rugs at the top or bottom of the stairs. If you do have throw rugs, attach them to the floor with carpet tape.  · Make sure that you have a light switch at the top of the stairs and the bottom of the stairs. If you do not have them, ask someone to add them for you.  What else can I do to help prevent falls?  · Wear shoes that:  ? Do not have high heels.  ? Have rubber bottoms.  ? Are comfortable and fit you well.  ? Are closed at the toe. Do not wear sandals.  · If you use a stepladder:  ? Make sure that it is fully opened. Do not climb a closed stepladder.  ? Make sure that both sides of the stepladder are locked into place.  ? Ask someone to hold it for you, if possible.  · Clearly mikie and make sure that you can see:  ? Any grab bars or handrails.  ? First and last steps.  ? Where the edge of each step is.  · Use tools that help you move around (mobility aids) if they are needed. These include:  ? Canes.  ? Walkers.  ? Scooters.  ? Crutches.  · Turn on the lights when you go into a dark area. Replace any light bulbs as soon as they burn out.  · Set up  your furniture so you have a clear path. Avoid moving your furniture around.  · If any of your floors are uneven, fix them.  · If there are any pets around you, be aware of where they are.  · Review your medicines with your doctor. Some medicines can make you feel dizzy. This can increase your chance of falling.  Ask your doctor what other things that you can do to help prevent falls.  This information is not intended to replace advice given to you by your health care provider. Make sure you discuss any questions you have with your health care provider.  Document Released: 10/14/2010 Document Revised: 05/25/2017 Document Reviewed: 01/22/2016  Elsevier Interactive Patient Education © 2018 [employer]

## 2018-12-07 DIAGNOSIS — D50.0 IRON DEFICIENCY ANEMIA DUE TO CHRONIC BLOOD LOSS: ICD-10-CM

## 2018-12-07 DIAGNOSIS — I50.22 CHRONIC SYSTOLIC CONGESTIVE HEART FAILURE (HCC): Primary | ICD-10-CM

## 2018-12-07 DIAGNOSIS — Z79.01 LONG TERM CURRENT USE OF ANTICOAGULANT: ICD-10-CM

## 2018-12-10 ENCOUNTER — RESULTS ENCOUNTER (OUTPATIENT)
Dept: FAMILY MEDICINE CLINIC | Facility: CLINIC | Age: 68
End: 2018-12-10

## 2018-12-10 DIAGNOSIS — I50.22 CHRONIC SYSTOLIC CONGESTIVE HEART FAILURE (HCC): ICD-10-CM

## 2018-12-10 DIAGNOSIS — Z79.01 LONG TERM CURRENT USE OF ANTICOAGULANT: ICD-10-CM

## 2018-12-10 DIAGNOSIS — D50.0 IRON DEFICIENCY ANEMIA DUE TO CHRONIC BLOOD LOSS: ICD-10-CM

## 2018-12-11 ENCOUNTER — TELEPHONE (OUTPATIENT)
Dept: FAMILY MEDICINE CLINIC | Facility: CLINIC | Age: 68
End: 2018-12-11

## 2018-12-11 NOTE — TELEPHONE ENCOUNTER
----- Message from Edy Kline MD sent at 12/10/2018 12:41 PM EST -----  Ok - please keep trying    ----- Message -----  From: Whitney Bartlett MA  Sent: 12/10/2018  10:52 AM  To: MD Dr. Angeline Sow says it is okay and I have tried getting in touch with them Since Friday and I have left 2 voicemails but he hasn't came for his blood work yet.       ----- Message -----  From: Kamari Marcus MD  Sent: 12/10/2018  10:33 AM  To: Whitney Bartlett MA    Okay to stop Coumadin  ----- Message -----  From: Whitney Bartlett MA  Sent: 12/7/2018  12:01 PM  To: Kamari Marcus MD    Hello,     If you don't care read below and let me know what you think about Adria coming off the coumadin?     Thanks!    ----- Message -----  From: Edy Kline MD  Sent: 12/7/2018   9:39 AM  To: Whitney Bartlett MA    Patient needs a CBC and INR on Monday - yifan placed the order in epic  Need to ask Dr Mccloud office if he feels that Adria can come off coumadin - he has required at least 3-4 transfusions over the last several years for Hgb's as low as 6  I have recommended to Adria that he come off coumadin but he wants Dr Mccloud opinion

## 2018-12-12 ENCOUNTER — TELEPHONE (OUTPATIENT)
Dept: FAMILY MEDICINE CLINIC | Facility: CLINIC | Age: 68
End: 2018-12-12

## 2018-12-12 ENCOUNTER — LAB (OUTPATIENT)
Dept: FAMILY MEDICINE CLINIC | Facility: CLINIC | Age: 68
End: 2018-12-12
Payer: MEDICARE

## 2018-12-12 DIAGNOSIS — Z79.01 LONG TERM CURRENT USE OF ANTICOAGULANT: ICD-10-CM

## 2018-12-12 DIAGNOSIS — D50.0 IRON DEFICIENCY ANEMIA DUE TO CHRONIC BLOOD LOSS: ICD-10-CM

## 2018-12-12 DIAGNOSIS — E11.42 TYPE 2 DIABETES MELLITUS WITH PERIPHERAL NEUROPATHY: ICD-10-CM

## 2018-12-12 DIAGNOSIS — E78.2 MIXED HYPERLIPIDEMIA: ICD-10-CM

## 2018-12-12 DIAGNOSIS — I25.10 CORONARY ARTERY DISEASE INVOLVING NATIVE CORONARY ARTERY OF NATIVE HEART WITHOUT ANGINA PECTORIS: Primary | ICD-10-CM

## 2018-12-12 DIAGNOSIS — I50.22 CHRONIC SYSTOLIC CONGESTIVE HEART FAILURE: ICD-10-CM

## 2018-12-12 LAB
ALBUMIN SERPL-MCNC: 4.5 G/DL (ref 3.4–4.8)
ALBUMIN/GLOB SERPL: 2 G/DL (ref 1.5–2.5)
ALP SERPL-CCNC: 59 U/L (ref 40–129)
ALT SERPL-CCNC: 27 U/L (ref 10–44)
AST SERPL-CCNC: 17 U/L (ref 10–34)
BASOPHILS # BLD AUTO: 0.02 10*3/MM3 (ref 0–0.3)
BASOPHILS NFR BLD AUTO: 0.2 % (ref 0–2)
BILIRUB SERPL-MCNC: 0.5 MG/DL (ref 0.2–1.8)
BUN SERPL-MCNC: 37 MG/DL (ref 7–21)
BUN/CREAT SERPL: 19.5 (ref 7–25)
CALCIUM SERPL-MCNC: 9.7 MG/DL (ref 7.7–10)
CHLORIDE SERPL-SCNC: 99 MMOL/L (ref 99–112)
CO2 SERPL-SCNC: 24.2 MMOL/L (ref 24.3–31.9)
CREAT SERPL-MCNC: 1.9 MG/DL (ref 0.43–1.29)
EOSINOPHIL # BLD AUTO: 0.23 10*3/MM3 (ref 0–0.7)
EOSINOPHIL NFR BLD AUTO: 1.9 % (ref 0–7)
ERYTHROCYTE [DISTWIDTH] IN BLOOD BY AUTOMATED COUNT: 21.5 % (ref 11.5–14.5)
GLOBULIN SER CALC-MCNC: 2.2 GM/DL
GLUCOSE SERPL-MCNC: 612 MG/DL (ref 70–110)
HCT VFR BLD AUTO: 32.5 % (ref 42–52)
HGB BLD-MCNC: 9.1 G/DL (ref 14–18)
IMM GRANULOCYTES # BLD: 0.02 10*3/MM3 (ref 0–0.03)
IMM GRANULOCYTES NFR BLD: 0.2 % (ref 0–0.5)
INR PPP: 1.06 (ref 0.9–1.1)
LYMPHOCYTES # BLD AUTO: 1.83 10*3/MM3 (ref 1–3)
LYMPHOCYTES NFR BLD AUTO: 15 % (ref 16–46)
MCH RBC QN AUTO: 20.2 PG (ref 27–33)
MCHC RBC AUTO-ENTMCNC: 28 G/DL (ref 33–37)
MCV RBC AUTO: 72.1 FL (ref 80–94)
MONOCYTES # BLD AUTO: 0.98 10*3/MM3 (ref 0.1–0.9)
MONOCYTES NFR BLD AUTO: 8 % (ref 0–12)
NEUTROPHILS # BLD AUTO: 9.14 10*3/MM3 (ref 1.4–6.5)
NEUTROPHILS NFR BLD AUTO: 74.7 % (ref 40–75)
PLATELET # BLD AUTO: 393 10*3/MM3 (ref 130–400)
PLATELET BLD QL SMEAR: NORMAL
POTASSIUM SERPL-SCNC: 5 MMOL/L (ref 3.5–5.3)
PROT SERPL-MCNC: 6.7 G/DL (ref 6–8)
PROTHROMBIN TIME: 14.1 SECONDS (ref 11–15.4)
RBC # BLD AUTO: 4.51 10*6/MM3 (ref 4.7–6.1)
RBC MORPH BLD: NORMAL
SODIUM SERPL-SCNC: 133 MMOL/L (ref 135–153)
WBC # BLD AUTO: 12.22 10*3/MM3 (ref 4.5–12.5)

## 2018-12-12 PROCEDURE — 36415 COLL VENOUS BLD VENIPUNCTURE: CPT | Performed by: GENERAL PRACTICE

## 2018-12-12 NOTE — TELEPHONE ENCOUNTER
Called and informed patient that his glucose was at a critical level @ 612. I spoke to farhad wyman and she said to have the patient check his blood sugars and try to get it to go down. Use his insulin to correct it and If it does not go down to a normal level to go to the emergency room.

## 2018-12-25 DIAGNOSIS — I10 ESSENTIAL HYPERTENSION: Primary | ICD-10-CM

## 2018-12-25 DIAGNOSIS — D50.0 IRON DEFICIENCY ANEMIA DUE TO CHRONIC BLOOD LOSS: ICD-10-CM

## 2018-12-28 ENCOUNTER — LAB (OUTPATIENT)
Dept: FAMILY MEDICINE CLINIC | Facility: CLINIC | Age: 68
End: 2018-12-28

## 2018-12-28 DIAGNOSIS — D50.0 IRON DEFICIENCY ANEMIA DUE TO CHRONIC BLOOD LOSS: ICD-10-CM

## 2018-12-28 DIAGNOSIS — I10 ESSENTIAL HYPERTENSION: ICD-10-CM

## 2018-12-29 LAB
ALBUMIN SERPL-MCNC: 4.7 G/DL (ref 3.4–4.8)
ALBUMIN/GLOB SERPL: 2.1 G/DL (ref 1.5–2.5)
ALP SERPL-CCNC: 63 U/L (ref 40–129)
ALT SERPL-CCNC: 30 U/L (ref 10–44)
AST SERPL-CCNC: 18 U/L (ref 10–34)
BASOPHILS # BLD AUTO: 0.02 10*3/MM3 (ref 0–0.3)
BASOPHILS NFR BLD AUTO: 0.2 % (ref 0–2)
BILIRUB SERPL-MCNC: 0.3 MG/DL (ref 0.2–1.8)
BUN SERPL-MCNC: 37 MG/DL (ref 7–21)
BUN/CREAT SERPL: 20.1 (ref 7–25)
CALCIUM SERPL-MCNC: 9.7 MG/DL (ref 7.7–10)
CHLORIDE SERPL-SCNC: 100 MMOL/L (ref 99–112)
CO2 SERPL-SCNC: 24.1 MMOL/L (ref 24.3–31.9)
CREAT SERPL-MCNC: 1.84 MG/DL (ref 0.43–1.29)
EOSINOPHIL # BLD AUTO: 0.19 10*3/MM3 (ref 0–0.7)
EOSINOPHIL NFR BLD AUTO: 2.2 % (ref 0–7)
ERYTHROCYTE [DISTWIDTH] IN BLOOD BY AUTOMATED COUNT: 20.8 % (ref 11.5–14.5)
GLOBULIN SER CALC-MCNC: 2.2 GM/DL
GLUCOSE SERPL-MCNC: 467 MG/DL (ref 70–110)
HCT VFR BLD AUTO: 31.9 % (ref 42–52)
HGB BLD-MCNC: 9.3 G/DL (ref 14–18)
IMM GRANULOCYTES # BLD: 0.02 10*3/MM3 (ref 0–0.03)
IMM GRANULOCYTES NFR BLD: 0.2 % (ref 0–0.5)
LYMPHOCYTES # BLD AUTO: 2.04 10*3/MM3 (ref 1–3)
LYMPHOCYTES NFR BLD AUTO: 23.3 % (ref 16–46)
MCH RBC QN AUTO: 20.4 PG (ref 27–33)
MCHC RBC AUTO-ENTMCNC: 29.2 G/DL (ref 33–37)
MCV RBC AUTO: 70.1 FL (ref 80–94)
MONOCYTES # BLD AUTO: 0.71 10*3/MM3 (ref 0.1–0.9)
MONOCYTES NFR BLD AUTO: 8.1 % (ref 0–12)
NEUTROPHILS # BLD AUTO: 5.79 10*3/MM3 (ref 1.4–6.5)
NEUTROPHILS NFR BLD AUTO: 66 % (ref 40–75)
PLATELET # BLD AUTO: 311 10*3/MM3 (ref 130–400)
PLATELET BLD QL SMEAR: NORMAL
POTASSIUM SERPL-SCNC: 4.8 MMOL/L (ref 3.5–5.3)
PROT SERPL-MCNC: 6.9 G/DL (ref 6–8)
RBC # BLD AUTO: 4.55 10*6/MM3 (ref 4.7–6.1)
RBC MORPH BLD: NORMAL
SODIUM SERPL-SCNC: 133 MMOL/L (ref 135–153)
WBC # BLD AUTO: 8.77 10*3/MM3 (ref 4.5–12.5)

## 2019-01-01 ENCOUNTER — TRANSCRIBE ORDERS (OUTPATIENT)
Dept: ADMINISTRATIVE | Facility: HOSPITAL | Age: 69
End: 2019-01-01

## 2019-01-01 ENCOUNTER — OFFICE VISIT (OUTPATIENT)
Dept: FAMILY MEDICINE CLINIC | Facility: CLINIC | Age: 69
End: 2019-01-01

## 2019-01-01 ENCOUNTER — HOSPITAL ENCOUNTER (OUTPATIENT)
Dept: GENERAL RADIOLOGY | Facility: HOSPITAL | Age: 69
Discharge: HOME | End: 2019-12-31
Admitting: ANESTHESIOLOGY

## 2019-01-01 VITALS
WEIGHT: 169 LBS | DIASTOLIC BLOOD PRESSURE: 58 MMHG | HEIGHT: 68 IN | RESPIRATION RATE: 14 BRPM | HEART RATE: 84 BPM | BODY MASS INDEX: 25.61 KG/M2 | TEMPERATURE: 98.2 F | OXYGEN SATURATION: 96 % | SYSTOLIC BLOOD PRESSURE: 107 MMHG

## 2019-01-01 DIAGNOSIS — M54.50 LOW BACK PAIN, UNSPECIFIED BACK PAIN LATERALITY, UNSPECIFIED CHRONICITY, UNSPECIFIED WHETHER SCIATICA PRESENT: Primary | ICD-10-CM

## 2019-01-01 DIAGNOSIS — J44.9 COPD MIXED TYPE (HCC): ICD-10-CM

## 2019-01-01 DIAGNOSIS — I73.9 PAD (PERIPHERAL ARTERY DISEASE) (HCC): Primary | ICD-10-CM

## 2019-01-01 DIAGNOSIS — I50.22 CHRONIC SYSTOLIC CONGESTIVE HEART FAILURE (HCC): ICD-10-CM

## 2019-01-01 DIAGNOSIS — K21.9 GASTROESOPHAGEAL REFLUX DISEASE WITHOUT ESOPHAGITIS: ICD-10-CM

## 2019-01-01 DIAGNOSIS — N18.30 STAGE 3 CHRONIC KIDNEY DISEASE (HCC): ICD-10-CM

## 2019-01-01 DIAGNOSIS — J30.2 CHRONIC SEASONAL ALLERGIC RHINITIS: ICD-10-CM

## 2019-01-01 DIAGNOSIS — E55.9 VITAMIN D DEFICIENCY: ICD-10-CM

## 2019-01-01 DIAGNOSIS — Z00.00 HEALTHCARE MAINTENANCE: ICD-10-CM

## 2019-01-01 DIAGNOSIS — E11.42 TYPE 2 DIABETES MELLITUS WITH PERIPHERAL NEUROPATHY (HCC): ICD-10-CM

## 2019-01-01 DIAGNOSIS — F17.200 SMOKER: ICD-10-CM

## 2019-01-01 DIAGNOSIS — K57.30 DIVERTICULOSIS OF LARGE INTESTINE WITHOUT HEMORRHAGE: ICD-10-CM

## 2019-01-01 DIAGNOSIS — Z23 ENCOUNTER FOR IMMUNIZATION: ICD-10-CM

## 2019-01-01 DIAGNOSIS — I63.231 CEREBRAL INFARCTION DUE TO OCCLUSION OF RIGHT INTERNAL CAROTID ARTERY (HCC): ICD-10-CM

## 2019-01-01 DIAGNOSIS — F41.9 CHRONIC ANXIETY: ICD-10-CM

## 2019-01-01 DIAGNOSIS — Z87.891 PERSONAL HISTORY OF NICOTINE DEPENDENCE: ICD-10-CM

## 2019-01-01 DIAGNOSIS — I25.10 CORONARY ARTERY DISEASE INVOLVING NATIVE CORONARY ARTERY OF NATIVE HEART WITHOUT ANGINA PECTORIS: ICD-10-CM

## 2019-01-01 DIAGNOSIS — I10 ESSENTIAL HYPERTENSION: ICD-10-CM

## 2019-01-01 DIAGNOSIS — D50.0 IRON DEFICIENCY ANEMIA DUE TO CHRONIC BLOOD LOSS: ICD-10-CM

## 2019-01-01 DIAGNOSIS — M54.50 LOW BACK PAIN, UNSPECIFIED BACK PAIN LATERALITY, UNSPECIFIED CHRONICITY, UNSPECIFIED WHETHER SCIATICA PRESENT: ICD-10-CM

## 2019-01-01 DIAGNOSIS — E78.2 MIXED HYPERLIPIDEMIA: ICD-10-CM

## 2019-01-01 PROCEDURE — 72110 X-RAY EXAM L-2 SPINE 4/>VWS: CPT

## 2019-01-01 PROCEDURE — 72110 X-RAY EXAM L-2 SPINE 4/>VWS: CPT | Performed by: RADIOLOGY

## 2019-01-01 PROCEDURE — G0009 ADMIN PNEUMOCOCCAL VACCINE: HCPCS | Performed by: GENERAL PRACTICE

## 2019-01-01 PROCEDURE — 99214 OFFICE O/P EST MOD 30 MIN: CPT | Performed by: GENERAL PRACTICE

## 2019-01-01 PROCEDURE — 90732 PPSV23 VACC 2 YRS+ SUBQ/IM: CPT | Performed by: GENERAL PRACTICE

## 2019-01-03 ENCOUNTER — OFFICE VISIT (OUTPATIENT)
Dept: FAMILY MEDICINE CLINIC | Facility: CLINIC | Age: 69
End: 2019-01-03

## 2019-01-03 VITALS
DIASTOLIC BLOOD PRESSURE: 52 MMHG | OXYGEN SATURATION: 97 % | SYSTOLIC BLOOD PRESSURE: 106 MMHG | TEMPERATURE: 97.8 F | HEART RATE: 65 BPM | WEIGHT: 176 LBS | HEIGHT: 69 IN | BODY MASS INDEX: 26.07 KG/M2

## 2019-01-03 DIAGNOSIS — D50.0 IRON DEFICIENCY ANEMIA DUE TO CHRONIC BLOOD LOSS: ICD-10-CM

## 2019-01-03 DIAGNOSIS — I10 ESSENTIAL HYPERTENSION: ICD-10-CM

## 2019-01-03 DIAGNOSIS — M54.9 MECHANICAL BACK PAIN: ICD-10-CM

## 2019-01-03 DIAGNOSIS — I25.10 CORONARY ARTERY DISEASE INVOLVING NATIVE CORONARY ARTERY OF NATIVE HEART WITHOUT ANGINA PECTORIS: ICD-10-CM

## 2019-01-03 DIAGNOSIS — M51.36 BULGING LUMBAR DISC: ICD-10-CM

## 2019-01-03 DIAGNOSIS — E11.42 TYPE 2 DIABETES MELLITUS WITH PERIPHERAL NEUROPATHY (HCC): Primary | ICD-10-CM

## 2019-01-03 PROCEDURE — 90632 HEPA VACCINE ADULT IM: CPT | Performed by: PHYSICIAN ASSISTANT

## 2019-01-03 PROCEDURE — 90471 IMMUNIZATION ADMIN: CPT | Performed by: PHYSICIAN ASSISTANT

## 2019-01-03 PROCEDURE — 99214 OFFICE O/P EST MOD 30 MIN: CPT | Performed by: PHYSICIAN ASSISTANT

## 2019-01-03 RX ORDER — OXYCODONE HYDROCHLORIDE 10 MG/1
10 TABLET ORAL 4 TIMES DAILY
Qty: 120 TABLET | Refills: 0 | Status: SHIPPED | OUTPATIENT
Start: 2019-01-03 | End: 2019-02-18 | Stop reason: SDUPTHER

## 2019-01-07 DIAGNOSIS — E11.42 TYPE 2 DIABETES MELLITUS WITH PERIPHERAL NEUROPATHY (HCC): ICD-10-CM

## 2019-01-07 NOTE — PROGRESS NOTES
"Katie Zuñiga is a 68 y.o. male.     Chief complaint- diabetes    History of Present Illness     Diabetes-  Not at goal with xultophy 50 units qhs,  And metformin.  Lab Results   Component Value Date    HGBA1C 9.30 (H) 05/11/2018     Iron deficiency anemia-currently stable with iron supplementation    Chronic back pain -  He complains of chronic lumbar back pain.  Pain is not related to a specific injury.  Symptoms include back pain,stiffness, and decreased ROJM.  He denies urinary incontinence or fecal incontinence.  Pain is located int he low back.  Radiation to left leg. Pain is described as varying from mild to severe burning and throbbing. Onset x years.  Symptoms exacerbated by standing and walking.  Symptoms alleviated by rest and opioid analgesics.  Associated symptoms include sexual dysfunction.  Pain causes functional limitation including general activity, mood, walking ability, work, and activities of daily living.    9/4/09 MRI lumbar spine = disc bulging L4/5 and L5/S1 with central protrusion of disc and facet arthropathy involving L3-S1    CAD - stable with aspirin and Nitrostat when necessary    Hypertension - stable with valsartan, Lasix, and coreg    Pain Score    01/03/19 1418   PainSc:   6   PainLoc: Back       /52   Pulse 65   Temp 97.8 °F (36.6 °C) (Oral)   Ht 174 cm (68.5\")   Wt 79.8 kg (176 lb)   SpO2 97%   BMI 26.37 kg/m²     The following portions of the patient's history were reviewed and updated as appropriate: allergies, current medications, past family history, past medical history, past social history, past surgical history and problem list.    Review of Systems   Constitutional: Negative for activity change, appetite change and fever.   HENT: Negative for ear pain, sinus pressure and sore throat.    Eyes: Negative for pain and visual disturbance.   Respiratory: Negative for cough and chest tightness.    Cardiovascular: Negative for chest pain and palpitations. " "  Gastrointestinal: Negative for abdominal pain, constipation, diarrhea, nausea and vomiting.   Endocrine: Negative for polydipsia and polyuria.   Genitourinary: Negative for dysuria and frequency.   Musculoskeletal: Positive for back pain. Negative for myalgias.   Skin: Negative for color change and rash.   Allergic/Immunologic: Negative for food allergies and immunocompromised state.   Neurological: Negative for dizziness, syncope and headaches.   Hematological: Negative for adenopathy. Does not bruise/bleed easily.   Psychiatric/Behavioral: Negative for hallucinations and suicidal ideas. The patient is not nervous/anxious.        /52   Pulse 65   Temp 97.8 °F (36.6 °C) (Oral)   Ht 174 cm (68.5\")   Wt 79.8 kg (176 lb)   SpO2 97%   BMI 26.37 kg/m²     Objective   Physical Exam   Constitutional: He is oriented to person, place, and time. He appears well-developed and well-nourished.   HENT:   Head: Normocephalic and atraumatic.   Nose: Nose normal.   Mouth/Throat: Oropharynx is clear and moist.   Eyes: Conjunctivae and EOM are normal. Pupils are equal, round, and reactive to light.   Neck: Normal range of motion. Neck supple. No tracheal deviation present. No thyromegaly present.   Cardiovascular: Normal rate, regular rhythm, normal heart sounds and intact distal pulses.   No murmur heard.  Pulmonary/Chest: Effort normal and breath sounds normal. No respiratory distress. He has no wheezes.   Abdominal: Soft. Bowel sounds are normal. There is no tenderness. There is no guarding.   Musculoskeletal: Normal range of motion. He exhibits tenderness (lumbar musculature diffusely tender). He exhibits no edema.   Lymphadenopathy:     He has no cervical adenopathy.   Neurological: He is alert and oriented to person, place, and time.   Skin: Skin is warm and dry. No rash noted.   Psychiatric: He has a normal mood and affect. His behavior is normal.   Nursing note and vitals reviewed.      Assessment/Plan "   Diagnoses and all orders for this visit:    Type 2 diabetes mellitus with peripheral neuropathy (CMS/HCC)  Comments:  increase xultophy 60 units qhs  Orders:  -     CBC & Differential; Future  -     Hemoglobin A1c; Future  -     Comprehensive Metabolic Panel; Future    Iron deficiency anemia due to chronic blood loss  -     CBC & Differential; Future  -     Iron and TIBC; Future  -     Ferritin; Future    Mechanical back pain    Bulging lumbar disc    Essential hypertension    Coronary artery disease involving native coronary artery of native heart without angina pectoris    Other orders  -     Hepatitis A Vaccine Adult (HAVRIX) - Today  -     Hepatitis A Vaccine Adult  (HAVRIX) - Dose 2; Future  -     Insulin Degludec-Liraglutide (XULTOPHY) 100-3.6 UNIT-MG/ML solution pen-injector; Inject 60 Units under the skin into the appropriate area as directed every night at bedtime.    Prescription written for oxycodone ER (OxyContin) 20 mg 1 tablet twice a day #60 with no refills    Danae #[id number] Reviewed and is consistent.  UDS 12/4/18 reviewed and is consistent.    As part of the patient's treatment plan they are being prescribed a controlled substance/ substances with potential for abuse.  This patient has been made aware of the appropriate use of such medications, including potential risk of somnolence, limited ability to drive and/or work safely, and potential for overdose.  It has also been made clear these medications are for use by the patient only, without concomitant use of alcohol or other substances unless prescribed/advised by medical provider.    Patient has completed prescribing agreement detailing terms of continued prescribing of controlled substances including monitoring DANAE reports, urine drug screens, and pill counts.  The patient is aware DANAE reports are reviewed on a regular basis and scanned into the chart.    History and physical exam exhibit continued safe and appropriate use of  controlled substances.

## 2019-01-08 ENCOUNTER — RESULTS ENCOUNTER (OUTPATIENT)
Dept: FAMILY MEDICINE CLINIC | Facility: CLINIC | Age: 69
End: 2019-01-08

## 2019-01-08 DIAGNOSIS — D50.0 IRON DEFICIENCY ANEMIA DUE TO CHRONIC BLOOD LOSS: ICD-10-CM

## 2019-01-19 RX ORDER — GLUCOSAMINE HCL/CHONDROITIN SU 500-400 MG
CAPSULE ORAL
Qty: 120 EACH | Refills: 5 | Status: SHIPPED | OUTPATIENT
Start: 2019-01-19 | End: 2020-01-01 | Stop reason: SDUPTHER

## 2019-02-01 RX ORDER — FUROSEMIDE 40 MG/1
40 TABLET ORAL EVERY MORNING
Qty: 30 TABLET | Refills: 2 | Status: SHIPPED | OUTPATIENT
Start: 2019-02-01 | End: 2019-03-25 | Stop reason: SDUPTHER

## 2019-02-11 ENCOUNTER — OFFICE VISIT (OUTPATIENT)
Dept: FAMILY MEDICINE CLINIC | Facility: CLINIC | Age: 69
End: 2019-02-11

## 2019-02-11 ENCOUNTER — LAB (OUTPATIENT)
Dept: FAMILY MEDICINE CLINIC | Facility: CLINIC | Age: 69
End: 2019-02-11

## 2019-02-11 VITALS
TEMPERATURE: 98 F | BODY MASS INDEX: 25.18 KG/M2 | RESPIRATION RATE: 12 BRPM | SYSTOLIC BLOOD PRESSURE: 105 MMHG | DIASTOLIC BLOOD PRESSURE: 60 MMHG | HEART RATE: 56 BPM | OXYGEN SATURATION: 94 % | HEIGHT: 69 IN | WEIGHT: 170 LBS

## 2019-02-11 DIAGNOSIS — E78.2 MIXED HYPERLIPIDEMIA: Primary | ICD-10-CM

## 2019-02-11 DIAGNOSIS — E11.42 TYPE 2 DIABETES MELLITUS WITH PERIPHERAL NEUROPATHY (HCC): ICD-10-CM

## 2019-02-11 DIAGNOSIS — I50.22 CHRONIC SYSTOLIC CONGESTIVE HEART FAILURE (HCC): ICD-10-CM

## 2019-02-11 DIAGNOSIS — J30.2 CHRONIC SEASONAL ALLERGIC RHINITIS: ICD-10-CM

## 2019-02-11 DIAGNOSIS — F17.200 TOBACCO USE DISORDER: ICD-10-CM

## 2019-02-11 DIAGNOSIS — D50.0 IRON DEFICIENCY ANEMIA DUE TO CHRONIC BLOOD LOSS: ICD-10-CM

## 2019-02-11 DIAGNOSIS — I25.5 ISCHEMIC CARDIOMYOPATHY: ICD-10-CM

## 2019-02-11 DIAGNOSIS — J44.9 COPD MIXED TYPE (HCC): ICD-10-CM

## 2019-02-11 DIAGNOSIS — I25.10 CORONARY ARTERY DISEASE INVOLVING NATIVE CORONARY ARTERY OF NATIVE HEART WITHOUT ANGINA PECTORIS: ICD-10-CM

## 2019-02-11 DIAGNOSIS — I10 ESSENTIAL HYPERTENSION: ICD-10-CM

## 2019-02-11 LAB
ALBUMIN SERPL-MCNC: 4.3 G/DL (ref 3.4–4.8)
ALBUMIN/GLOB SERPL: 1.8 G/DL (ref 1.5–2.5)
ALP SERPL-CCNC: 67 U/L (ref 40–129)
ALT SERPL-CCNC: 83 U/L (ref 10–44)
AST SERPL-CCNC: 55 U/L (ref 10–34)
BASOPHILS # BLD MANUAL: 0 10*3/MM3 (ref 0–0.3)
BASOPHILS NFR BLD MANUAL: 0 % (ref 0–1.5)
BILIRUB SERPL-MCNC: 0.3 MG/DL (ref 0.2–1.8)
BUN SERPL-MCNC: 45 MG/DL (ref 7–21)
BUN/CREAT SERPL: 20.9 (ref 7–25)
CALCIUM SERPL-MCNC: 9.5 MG/DL (ref 7.7–10)
CHLORIDE SERPL-SCNC: 99 MMOL/L (ref 99–112)
CO2 SERPL-SCNC: 23 MMOL/L (ref 24.3–31.9)
CREAT SERPL-MCNC: 2.15 MG/DL (ref 0.43–1.29)
DIFFERENTIAL COMMENT: ABNORMAL
EOSINOPHIL # BLD MANUAL: 0 10*3/MM3 (ref 0–0.7)
EOSINOPHIL NFR BLD MANUAL: 0 % (ref 0–7)
ERYTHROCYTE [DISTWIDTH] IN BLOOD BY AUTOMATED COUNT: 21.1 % (ref 11.5–14.5)
FERRITIN SERPL-MCNC: 15 NG/ML (ref 21.9–321.7)
GLOBULIN SER CALC-MCNC: 2.4 GM/DL
GLUCOSE SERPL-MCNC: 424 MG/DL (ref 70–110)
HBA1C MFR BLD: 10.7 % (ref 4.5–5.7)
HCT VFR BLD AUTO: 28.6 % (ref 42–52)
HGB BLD-MCNC: 7.8 G/DL (ref 14–18)
IRON SATN MFR SERPL: 5 % (ref 20–50)
IRON SERPL-MCNC: 19 MCG/DL (ref 53–167)
LYMPHOCYTES # BLD MANUAL: 1.66 10*3/MM3 (ref 1–3)
LYMPHOCYTES NFR BLD MANUAL: 15 % (ref 16–46)
MCH RBC QN AUTO: 18.3 PG (ref 27–33)
MCHC RBC AUTO-ENTMCNC: 27.3 G/DL (ref 33–37)
MCV RBC AUTO: 67 FL (ref 80–94)
MONOCYTES # BLD MANUAL: 0.77 10*3/MM3 (ref 0.1–0.9)
MONOCYTES NFR BLD MANUAL: 7 % (ref 0–12)
NEUTROPHILS # BLD MANUAL: 8.63 10*3/MM3 (ref 1.4–6.5)
NEUTROPHILS NFR BLD MANUAL: 72 % (ref 40–75)
PLATELET # BLD AUTO: 496 10*3/MM3 (ref 130–400)
PLATELET BLD QL SMEAR: ABNORMAL
POTASSIUM SERPL-SCNC: 4.6 MMOL/L (ref 3.5–5.3)
PROT SERPL-MCNC: 6.7 G/DL (ref 6–8)
RBC # BLD AUTO: 4.27 10*6/MM3 (ref 4.7–6.1)
RBC MORPH BLD: ABNORMAL
SODIUM SERPL-SCNC: 134 MMOL/L (ref 135–153)
TIBC SERPL-MCNC: 388 MCG/DL (ref 241–421)
UIBC SERPL-MCNC: 369 MCG/DL
WBC # BLD AUTO: 11.06 10*3/MM3 (ref 4.5–12.5)

## 2019-02-11 PROCEDURE — 99214 OFFICE O/P EST MOD 30 MIN: CPT | Performed by: GENERAL PRACTICE

## 2019-02-12 NOTE — PROGRESS NOTES
Katie Zuñiga is a 68 y.o. male.     History of Present Illness     Generalized Weakness  Returns with a several week history of increasing fatigue and generalized weakness.  The symptoms have been associated with lightheadedness when getting up from sitting and especially lying.  There has been no change in his shortness of breath or cough and he denies any chest pain, palpitations, or hemoptysis.  While he had several episodes of nausea and vomiting last week this has resolved and he has been eating well with no abdominal pain, diarrhea, hematochezia, or melena.  There is no history of any dysuria or hematuria and he denies any new headaches, change in his back or joint pain, rash, fever, or chills.  He has a history of recurrent anemia requiring transfusion since starting on Coumadin    COPD  The patient reports that his SOB, cough and wheezing have been at baseline since last here. He states that these symptoms occur at any time during the day or night. He reports that his symptoms become worse with activity. His symptoms are reduced with rest and with inhaled inhaled medication. The patient states he also has nasal congestion. He is following the treatment plan, including medications as directed. He currently smokes approximately 1 packs per day. Updated CT of the chest performed on 2/26/18 was reported as showing emphysematous changes as well as a 4 mm CHENCHO nodule.     Congestive Heart Failure  Patient has a history of congestive heart failure with an estimated EF of 26% on an echocardiogram performed on 10/3/17. 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 There was no thrombus noted on his most recent echocardiogram and his cardiologist agrees that he can come off coumadin. Patient's current complaints are dyspnea with exertion, lightheadedness, and fatigue. He denies dyspnea at rest, orthopnea, paroxysmal nocturnal dyspnea, chest pain and palpitations.  Current medications include entresto, carvedilol,  furosemide and coumadin.  He states he is compliant most of the time with his medications. He states he is noncompliant most of the time with his diet.  He continues to be followed by cardiology    Coronary artery disease  He has a history of CABG and CHF. Previous diagnostic testing includes: cardiac catheterization Recent history: taking medications as instructed, no medication side effects noted, no TIA's, no chest pain on exertion, notes increased dyspnea on exertion, no change and no swelling of ankles. Medication side effects include: none.     Diabetes  Current symptoms include none. Patient denies paresthesia of the feet, visual disturbances, polydipsia, polyuria, hypoglycemia and foot ulcerations. Evaluation to date has been: hemoglobin A1C. Home sugars: BGs are running  consistent with Hgb A1C. Current treatments: xultophy - 50 units daily.      Dyslipidemia  Compliance with treatment has been poor. The patient exercises rarely. He is currently being prescribed the following medication for his dyslipidemia - rosuvastatin.    The following portions of the patient's history were reviewed and updated as appropriate: allergies, current medications, past medical history, past social history and problem list.    Review of Systems   Constitutional: Positive for fatigue. Negative for appetite change, chills, fever and unexpected weight change.   HENT: Positive for rhinorrhea and sneezing. Negative for congestion, ear pain, postnasal drip, sinus pressure, sore throat and voice change.    Eyes: Negative for visual disturbance.   Respiratory: Positive for cough, shortness of breath and wheezing.    Cardiovascular: Negative for chest pain, palpitations and leg swelling.   Gastrointestinal: Negative for abdominal pain, blood in stool, constipation, diarrhea, nausea and vomiting.   Endocrine: Negative for polydipsia and polyuria.   Genitourinary: Negative for difficulty  urinating, dysuria, frequency, hematuria and urgency.   Musculoskeletal: Positive for arthralgias and back pain. Negative for joint swelling, myalgias and neck pain.   Skin: Negative for color change.   Neurological: Positive for dizziness, weakness (generalized) and headaches. Negative for tremors and numbness.   Psychiatric/Behavioral: Negative for dysphoric mood, sleep disturbance and suicidal ideas. The patient is not nervous/anxious.      Objective   Physical Exam   Constitutional: He is oriented to person, place, and time. He appears well-developed and well-nourished. He is cooperative. He does not have a sickly appearance. No distress.   Alert and in fair spirits.  Appeared older than stated age, chronically ill, and pale.  No apparent distress.  No cyanosis, diaphoresis, or jaundice.   HENT:   Head: Atraumatic.   Right Ear: Tympanic membrane, external ear and ear canal normal.   Left Ear: Tympanic membrane, external ear and ear canal normal.   Mouth/Throat: Oropharynx is clear and moist.   Eyes: EOM are normal. Pupils are equal, round, and reactive to light. No scleral icterus.   Neck: No JVD present. Carotid bruit is not present. No tracheal deviation present. No thyromegaly present.   Cardiovascular: Normal rate, regular rhythm, S1 normal, S2 normal, normal heart sounds and intact distal pulses. Exam reveals no gallop.   No murmur heard.  Pulmonary/Chest: No accessory muscle usage. No respiratory distress. He has decreased breath sounds in the right lower field and the left lower field. He has wheezes (diffuse-primarily with forced expiration). He has no rales.   Mild pulmonary hyperinflation   Abdominal: Soft. Normal aorta and bowel sounds are normal. He exhibits no abdominal bruit and no mass. There is no hepatosplenomegaly. There is no tenderness. No hernia.   Musculoskeletal: He exhibits no deformity.   No peripheral joint redness or warmth   Lymphadenopathy:        Head (right side): No submandibular  adenopathy present.        Head (left side): No submandibular adenopathy present.     He has no cervical adenopathy.   Neurological: He is alert and oriented to person, place, and time. He has normal strength and normal reflexes. He displays normal reflexes. A sensory deficit (decreased vibration sense both feet) is present. No cranial nerve deficit. He exhibits normal muscle tone. Coordination normal.   Skin: Skin is warm and dry. No rash noted. He is not diaphoretic. No pallor. Nails show no clubbing.   Psychiatric: He has a normal mood and affect. His behavior is normal.     Assessment/Plan   Problems Addressed this Visit        Cardiovascular and Mediastinum    Essential hypertension   Hypertension: both heart rate and blood pressure somewhat low today. Evidence of target organ damage: coronary artery disease and heart failure.  Encouraged to continue to work on diet and exercise plan.   The dose of both carvedilol and furosemide will be halved for now    Mixed hyperlipidemia  As above.   Continue current medication.    CAD (coronary artery disease)  Reminded regarding risk factor modification with an emphasis on tobacco cessation.  Continue current medication  Follow up with cardiology     Chronic systolic congestive heart failure (CMS/HCC)    As above.   Coumadin will be discontinued        Respiratory    COPD mixed type (CMS/HCC)    Chronic seasonal allergic rhinitis       Endocrine    Type 2 diabetes mellitus with peripheral neuropathy (CMS/HCC)  Diabetes mellitus Type II, under poor control.   Encouraged to continue to pursue ADA diet  Encouraged aerobic exercise.  Continue current medication       Hematopoietic and Hemostatic    Iron deficiency anemia due to chronic blood loss  With recurrent anemia requiring transfusion since starting on coumadin  Updated labs drawn.  Will likely require another transfusion        Other    Tobacco use disorder

## 2019-02-13 NOTE — PROGRESS NOTES
Spoke to pt and got this scheduled at [facility].     -- Needs to be crossed for 2 units pRBCs at [facility] and transfused with lasix 40 mg iv after the first

## 2019-02-14 ENCOUNTER — RESULTS ENCOUNTER (OUTPATIENT)
Dept: FAMILY MEDICINE CLINIC | Facility: CLINIC | Age: 69
End: 2019-02-14

## 2019-02-14 DIAGNOSIS — E11.42 TYPE 2 DIABETES MELLITUS WITH PERIPHERAL NEUROPATHY (HCC): ICD-10-CM

## 2019-02-14 DIAGNOSIS — D50.0 IRON DEFICIENCY ANEMIA DUE TO CHRONIC BLOOD LOSS: ICD-10-CM

## 2019-02-18 ENCOUNTER — OFFICE VISIT (OUTPATIENT)
Dept: FAMILY MEDICINE CLINIC | Facility: CLINIC | Age: 69
End: 2019-02-18

## 2019-02-18 VITALS
OXYGEN SATURATION: 98 % | RESPIRATION RATE: 12 BRPM | HEART RATE: 97 BPM | BODY MASS INDEX: 25.62 KG/M2 | DIASTOLIC BLOOD PRESSURE: 65 MMHG | WEIGHT: 173 LBS | SYSTOLIC BLOOD PRESSURE: 120 MMHG | TEMPERATURE: 98.6 F | HEIGHT: 69 IN

## 2019-02-18 DIAGNOSIS — K21.9 GASTROESOPHAGEAL REFLUX DISEASE WITHOUT ESOPHAGITIS: ICD-10-CM

## 2019-02-18 DIAGNOSIS — F17.200 SMOKER: ICD-10-CM

## 2019-02-18 DIAGNOSIS — Z00.00 HEALTHCARE MAINTENANCE: ICD-10-CM

## 2019-02-18 DIAGNOSIS — Z87.891 PERSONAL HISTORY OF NICOTINE DEPENDENCE: ICD-10-CM

## 2019-02-18 DIAGNOSIS — I25.10 CORONARY ARTERY DISEASE INVOLVING NATIVE CORONARY ARTERY OF NATIVE HEART WITHOUT ANGINA PECTORIS: ICD-10-CM

## 2019-02-18 DIAGNOSIS — I50.22 CHRONIC SYSTOLIC CONGESTIVE HEART FAILURE (HCC): ICD-10-CM

## 2019-02-18 DIAGNOSIS — E55.9 VITAMIN D DEFICIENCY: ICD-10-CM

## 2019-02-18 DIAGNOSIS — J44.9 COPD MIXED TYPE (HCC): ICD-10-CM

## 2019-02-18 DIAGNOSIS — E11.42 TYPE 2 DIABETES MELLITUS WITH PERIPHERAL NEUROPATHY (HCC): ICD-10-CM

## 2019-02-18 DIAGNOSIS — D50.0 IRON DEFICIENCY ANEMIA DUE TO CHRONIC BLOOD LOSS: ICD-10-CM

## 2019-02-18 DIAGNOSIS — J30.2 CHRONIC SEASONAL ALLERGIC RHINITIS: ICD-10-CM

## 2019-02-18 DIAGNOSIS — N52.01 ERECTILE DYSFUNCTION DUE TO ARTERIAL INSUFFICIENCY: ICD-10-CM

## 2019-02-18 DIAGNOSIS — K57.30 DIVERTICULOSIS OF LARGE INTESTINE WITHOUT HEMORRHAGE: ICD-10-CM

## 2019-02-18 DIAGNOSIS — E78.2 MIXED HYPERLIPIDEMIA: Primary | ICD-10-CM

## 2019-02-18 DIAGNOSIS — M54.9 MECHANICAL BACK PAIN: ICD-10-CM

## 2019-02-18 DIAGNOSIS — F41.9 CHRONIC ANXIETY: ICD-10-CM

## 2019-02-18 DIAGNOSIS — I10 ESSENTIAL HYPERTENSION: ICD-10-CM

## 2019-02-18 PROCEDURE — 99214 OFFICE O/P EST MOD 30 MIN: CPT | Performed by: GENERAL PRACTICE

## 2019-02-18 RX ORDER — OXYCODONE HYDROCHLORIDE 10 MG/1
10 TABLET ORAL 4 TIMES DAILY
Qty: 120 TABLET | Refills: 0 | Status: SHIPPED | OUTPATIENT
Start: 2019-02-18 | End: 2019-03-05 | Stop reason: SDUPTHER

## 2019-02-18 NOTE — PROGRESS NOTES
Katie Zuñiga is a 68 y.o. male.     History of Present Illness     Generalized Weakness  Hemoglobin drawn on 2/11/19 returned at 7.8 with an MCV of 67.  He has a history of recurrent anemia requiring transfusion since starting on Coumadin.  Underwent another soon after and has felt significant weight better since.  There has been a marked improvement in his energy levels and in the frequency and severity of his lightheadedness with postural changes.  He has stopped coumadin as advised  Lab Results   Component Value Date    WBC 11.06 02/11/2019    HGB 7.8 (L) 02/11/2019    HCT 28.6 (L) 02/11/2019    MCV 67.0 (L) 02/11/2019     (H) 02/11/2019     Lab Results   Component Value Date    IRON 43 (L) 04/11/2017    TIBC 388 02/11/2019    FERRITIN 15.00 (L) 02/11/2019     COPD  The patient reports that his SOB, cough and wheezing have remained at baseline since last here. He states that these symptoms occur at any time during the day or night. He reports that his symptoms become worse with activity. His symptoms are reduced with rest and with inhaled inhaled medication. The patient states he also has nasal congestion. He is following the treatment plan, including medications as directed. He currently smokes approximately 1 packs per day. Updated CT of the chest performed on 2/26/18 was reported as showing emphysematous changes as well as a 4 mm CHENCHO nodule.     Congestive Heart Failure  Patient has a history of congestive heart failure with an estimated EF of 26% on an echocardiogram performed on 10/3/17. 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 There was no thrombus noted on his most recent echocardiogram and his cardiologist agrees that he can come off coumadin. Patient's current complaints are dyspnea with exertion, lightheadedness, and fatigue. He denies dyspnea at rest, orthopnea, paroxysmal nocturnal dyspnea, chest pain and palpitations. Current  medications include entresto, carvedilol,  furosemide and coumadin.  He states he is compliant most of the time with his medications. He states he is noncompliant most of the time with his diet.  He continues to be followed by cardiology    Coronary artery disease  He has a history of CABG and CHF. Previous diagnostic testing includes: cardiac catheterization Recent history: taking medications as instructed, no medication side effects noted, no TIA's, no chest pain on exertion, notes increased dyspnea on exertion, no change and no swelling of ankles. Medication side effects include: none.     Diabetes  Current symptoms include none. Patient denies paresthesia of the feet, visual disturbances, polydipsia, polyuria, hypoglycemia and foot ulcerations. Evaluation to date has been: hemoglobin A1C. Home sugars: BGs are running  consistent with Hgb A1C. Current treatments: xultophy - 50 units daily.   Lab Results   Component Value Date    HGBA1C 10.70 (H) 02/11/2019      Dyslipidemia  Compliance with treatment has been poor. The patient exercises rarely. He is currently being prescribed the following medication for his dyslipidemia - rosuvastatin.  Lab Results   Component Value Date    CHOL 305 (H) 04/11/2017    CHLPL 120 05/11/2018    TRIG 309 (H) 05/11/2018    HDL 35 (L) 05/11/2018    LDL 23 05/11/2018     The following portions of the patient's history were reviewed and updated as appropriate: allergies, current medications, past medical history, past social history and problem list.    Review of Systems   Constitutional: Positive for fatigue. Negative for appetite change, chills, fever and unexpected weight change.   HENT: Positive for rhinorrhea and sneezing. Negative for congestion, ear pain, postnasal drip, sinus pressure, sore throat and voice change.    Eyes: Negative for visual disturbance.   Respiratory: Positive for cough, shortness of breath and wheezing.    Cardiovascular: Negative for chest pain,  palpitations and leg swelling.   Gastrointestinal: Negative for abdominal pain, blood in stool, constipation, diarrhea, nausea and vomiting.   Endocrine: Negative for polydipsia and polyuria.   Genitourinary: Negative for difficulty urinating, dysuria, frequency, hematuria and urgency.   Musculoskeletal: Positive for arthralgias and back pain. Negative for joint swelling, myalgias and neck pain.   Skin: Negative for color change.   Neurological: Positive for dizziness and headaches. Negative for tremors, weakness and numbness.   Psychiatric/Behavioral: Negative for dysphoric mood, sleep disturbance and suicidal ideas. The patient is not nervous/anxious.      Objective   Physical Exam   Constitutional: He is oriented to person, place, and time. He appears well-developed and well-nourished. He is cooperative. He does not have a sickly appearance. No distress.   Alert and in fair spirits.  Appeared older than stated age, chronically ill, and pale.  No apparent distress.  No cyanosis, diaphoresis, or jaundice.   HENT:   Head: Atraumatic.   Right Ear: Tympanic membrane, external ear and ear canal normal.   Left Ear: Tympanic membrane, external ear and ear canal normal.   Mouth/Throat: Oropharynx is clear and moist.   Eyes: EOM are normal. Pupils are equal, round, and reactive to light. No scleral icterus.   Neck: No JVD present. Carotid bruit is not present. No tracheal deviation present. No thyromegaly present.   Cardiovascular: Normal rate, regular rhythm, S1 normal, S2 normal, normal heart sounds and intact distal pulses. Exam reveals no gallop.   No murmur heard.  Pulmonary/Chest: No accessory muscle usage. No respiratory distress. He has decreased breath sounds in the right lower field and the left lower field. He has wheezes (diffuse-primarily with forced expiration). He has no rales.   Mild pulmonary hyperinflation   Abdominal: Soft. Normal aorta and bowel sounds are normal. He exhibits no abdominal bruit and no  mass. There is no hepatosplenomegaly. There is no tenderness. No hernia.   Musculoskeletal: He exhibits no deformity.   No peripheral joint redness or warmth   Lymphadenopathy:        Head (right side): No submandibular adenopathy present.        Head (left side): No submandibular adenopathy present.     He has no cervical adenopathy.   Neurological: He is alert and oriented to person, place, and time. He has normal strength and normal reflexes. He displays normal reflexes. A sensory deficit (decreased vibration sense both feet) is present. No cranial nerve deficit. He exhibits normal muscle tone. Coordination normal.   Skin: Skin is warm and dry. No rash noted. He is not diaphoretic. No pallor. Nails show no clubbing.   Psychiatric: He has a normal mood and affect. His behavior is normal.     Assessment/Plan   Problems Addressed this Visit        Cardiovascular and Mediastinum    Essential hypertension   Hypertension: at goal. Evidence of target organ damage: coronary artery disease and heart failure.  Encouraged to continue to work on diet and exercise plan.   Continue current medication  Scheduled for updated lab work in several weeks     Relevant Orders    Comprehensive Metabolic Panel    Mixed hyperlipidemia  As above.   Continue current medication.    CAD (coronary artery disease)  Reminded regarding risk factor modification with an emphasis on tobacco cessation.  Continue current medication  Follow up with cardiology     Chronic systolic congestive heart failure (CMS/HCC)  As above.   Continue current medication.       Respiratory    COPD mixed type (CMS/HCC)   COPD is stable.  Reminded of the importance of smoking cessation  Encouraged to remain as active as symptoms allow for    Chronic seasonal allergic rhinitis       Digestive    Vitamin D deficiency    Gastroesophageal reflux disease without esophagitis    Diverticulosis of large intestine without hemorrhage       Endocrine    Type 2 diabetes mellitus  with peripheral neuropathy (CMS/HCC)  Diabetes mellitus Type II, under poor control.   Encouraged to continue to pursue ADA diet  Encouraged aerobic exercise.  Continue current medication       Nervous and Auditory    Mechanical back pain  .les  Follow up with KAYLEE Lu    Relevant Medications    oxyCODONE (ROXICODONE) 10 MG tablet       Hematopoietic and Hemostatic    Iron deficiency anemia due to chronic blood loss  Will continue to monitor    Relevant Orders    CBC & Differential       Other    Chronic anxiety    Vasculogenic erectile dysfunction    Smoker    Relevant Orders    CT Chest Low Dose Wo    Healthcare maintenance  Will arrange an updated low-dose CT of the chest  We'll discuss an updated Pneumovax at his return     Relevant Orders    CT Chest Low Dose Wo

## 2019-03-05 ENCOUNTER — OFFICE VISIT (OUTPATIENT)
Dept: FAMILY MEDICINE CLINIC | Facility: CLINIC | Age: 69
End: 2019-03-05

## 2019-03-05 VITALS
DIASTOLIC BLOOD PRESSURE: 60 MMHG | OXYGEN SATURATION: 95 % | SYSTOLIC BLOOD PRESSURE: 120 MMHG | TEMPERATURE: 98.5 F | HEIGHT: 69 IN | BODY MASS INDEX: 25.18 KG/M2 | WEIGHT: 170 LBS | HEART RATE: 103 BPM

## 2019-03-05 DIAGNOSIS — E11.42 TYPE 2 DIABETES MELLITUS WITH PERIPHERAL NEUROPATHY (HCC): Primary | ICD-10-CM

## 2019-03-05 DIAGNOSIS — Z79.891 LONG-TERM CURRENT USE OF OPIATE ANALGESIC: ICD-10-CM

## 2019-03-05 DIAGNOSIS — M54.9 MECHANICAL BACK PAIN: ICD-10-CM

## 2019-03-05 DIAGNOSIS — I25.10 CORONARY ARTERY DISEASE INVOLVING NATIVE CORONARY ARTERY OF NATIVE HEART WITHOUT ANGINA PECTORIS: ICD-10-CM

## 2019-03-05 DIAGNOSIS — D50.0 IRON DEFICIENCY ANEMIA DUE TO CHRONIC BLOOD LOSS: ICD-10-CM

## 2019-03-05 DIAGNOSIS — M51.36 BULGING LUMBAR DISC: ICD-10-CM

## 2019-03-05 DIAGNOSIS — I10 ESSENTIAL HYPERTENSION: ICD-10-CM

## 2019-03-05 LAB
ALBUMIN SERPL-MCNC: 4.7 G/DL (ref 3.4–4.8)
ALBUMIN/GLOB SERPL: 2 G/DL (ref 1.5–2.5)
ALP SERPL-CCNC: 66 U/L (ref 40–129)
ALT SERPL-CCNC: 27 U/L (ref 10–44)
AST SERPL-CCNC: 25 U/L (ref 10–34)
BASOPHILS # BLD AUTO: 0.02 10*3/MM3 (ref 0–0.3)
BASOPHILS NFR BLD AUTO: 0.3 % (ref 0–2)
BILIRUB SERPL-MCNC: 0.3 MG/DL (ref 0.2–1.8)
BUN SERPL-MCNC: 33 MG/DL (ref 7–21)
BUN/CREAT SERPL: 18.1 (ref 7–25)
CALCIUM SERPL-MCNC: 10.3 MG/DL (ref 7.7–10)
CHLORIDE SERPL-SCNC: 96 MMOL/L (ref 99–112)
CO2 SERPL-SCNC: 22 MMOL/L (ref 24.3–31.9)
CREAT SERPL-MCNC: 1.82 MG/DL (ref 0.43–1.29)
EOSINOPHIL # BLD AUTO: 0.23 10*3/MM3 (ref 0–0.7)
EOSINOPHIL NFR BLD AUTO: 3 % (ref 0–7)
ERYTHROCYTE [DISTWIDTH] IN BLOOD BY AUTOMATED COUNT: 24.3 % (ref 11.5–14.5)
GLOBULIN SER CALC-MCNC: 2.4 GM/DL
GLUCOSE SERPL-MCNC: 439 MG/DL (ref 70–110)
HCT VFR BLD AUTO: 36 % (ref 42–52)
HGB BLD-MCNC: 10.4 G/DL (ref 14–18)
IMM GRANULOCYTES # BLD AUTO: 0.02 10*3/MM3 (ref 0–0.03)
IMM GRANULOCYTES NFR BLD AUTO: 0.3 % (ref 0–0.5)
LYMPHOCYTES # BLD AUTO: 2.26 10*3/MM3 (ref 1–3)
LYMPHOCYTES NFR BLD AUTO: 29.1 % (ref 16–46)
MCH RBC QN AUTO: 21 PG (ref 27–33)
MCHC RBC AUTO-ENTMCNC: 28.9 G/DL (ref 33–37)
MCV RBC AUTO: 72.6 FL (ref 80–94)
MONOCYTES # BLD AUTO: 0.67 10*3/MM3 (ref 0.1–0.9)
MONOCYTES NFR BLD AUTO: 8.6 % (ref 0–12)
NEUTROPHILS # BLD AUTO: 4.57 10*3/MM3 (ref 1.4–6.5)
NEUTROPHILS NFR BLD AUTO: 58.7 % (ref 40–75)
PLATELET # BLD AUTO: 394 10*3/MM3 (ref 130–400)
PLATELET BLD QL SMEAR: NORMAL
POTASSIUM SERPL-SCNC: 4.6 MMOL/L (ref 3.5–5.3)
PROT SERPL-MCNC: 7.1 G/DL (ref 6–8)
RBC # BLD AUTO: 4.96 10*6/MM3 (ref 4.7–6.1)
RBC MORPH BLD: NORMAL
SODIUM SERPL-SCNC: 131 MMOL/L (ref 135–153)
WBC # BLD AUTO: 7.77 10*3/MM3 (ref 4.5–12.5)

## 2019-03-05 PROCEDURE — 99214 OFFICE O/P EST MOD 30 MIN: CPT | Performed by: PHYSICIAN ASSISTANT

## 2019-03-05 RX ORDER — OXYCODONE HYDROCHLORIDE 10 MG/1
10 TABLET ORAL 4 TIMES DAILY
Qty: 120 TABLET | Refills: 0 | Status: SHIPPED | OUTPATIENT
Start: 2019-03-05 | End: 2019-04-09 | Stop reason: SDUPTHER

## 2019-03-05 RX ORDER — ALBUTEROL SULFATE 90 UG/1
2 AEROSOL, METERED RESPIRATORY (INHALATION) EVERY 6 HOURS PRN
Qty: 6.7 G | Refills: 2 | Status: SHIPPED | OUTPATIENT
Start: 2019-03-05 | End: 2020-01-01

## 2019-03-07 NOTE — PROGRESS NOTES
"Katie Zuñiga is a 68 y.o. male.     Chief complaint- diabetes    History of Present Illness     Diabetes-  Not at goal with xultophy and metformin.  Lab Results   Component Value Date    HGBA1C 10.70 (H) 02/11/2019     Lab Results   Component Value Date    GLUCOSE 219 (H) 04/11/2017    BUN 33 (H) 03/05/2019    CREATININE 1.82 (H) 03/05/2019    EGFRIFNONA 37 (L) 03/05/2019    EGFRIFAFRI 45 (L) 03/05/2019    BCR 18.1 03/05/2019    K 4.6 03/05/2019    CO2 22.0 (L) 03/05/2019    CALCIUM 10.3 (H) 03/05/2019    PROTENTOTREF 7.1 03/05/2019    ALBUMIN 4.70 03/05/2019    LABIL2 2.0 03/05/2019    AST 25 03/05/2019    ALT 27 03/05/2019       Iron deficiency anemia-currently stable with iron supplementation    Chronic back pain -  He complains of chronic lumbar back pain.  Pain is not related to a specific injury.  Symptoms include back pain,stiffness, and decreased ROJM.  He denies urinary incontinence or fecal incontinence.  Pain is located int he low back.  Radiation to left leg. Pain is described as varying from mild to severe burning and throbbing. Onset x years.  Symptoms exacerbated by standing and walking.  Symptoms alleviated by rest and opioid analgesics.  Associated symptoms include sexual dysfunction.  Pain causes functional limitation including general activity, mood, walking ability, work, and activities of daily living.    9/4/09 MRI lumbar spine = disc bulging L4/5 and L5/S1 with central protrusion of disc and facet arthropathy involving L3-S1    CAD - stable with aspirin and Nitrostat when necessary    Hypertension - stable with valsartan, Lasix, and coreg    Pain Score    03/05/19 1435   PainSc:   6   PainLoc: Back       /60   Pulse 103   Temp 98.5 °F (36.9 °C) (Oral)   Ht 174 cm (68.5\")   Wt 77.1 kg (170 lb)   SpO2 95%   BMI 25.47 kg/m²     The following portions of the patient's history were reviewed and updated as appropriate: allergies, current medications, past family history, " "past medical history, past social history, past surgical history and problem list.    Review of Systems   Constitutional: Negative for activity change, appetite change and fever.   HENT: Negative for ear pain, sinus pressure and sore throat.    Eyes: Negative for pain and visual disturbance.   Respiratory: Negative for cough and chest tightness.    Cardiovascular: Negative for chest pain and palpitations.   Gastrointestinal: Negative for abdominal pain, constipation, diarrhea, nausea and vomiting.   Endocrine: Negative for polydipsia and polyuria.   Genitourinary: Negative for dysuria and frequency.   Musculoskeletal: Positive for back pain. Negative for myalgias.   Skin: Negative for color change and rash.   Allergic/Immunologic: Negative for food allergies and immunocompromised state.   Neurological: Negative for dizziness, syncope and headaches.   Hematological: Negative for adenopathy. Does not bruise/bleed easily.   Psychiatric/Behavioral: Negative for hallucinations and suicidal ideas. The patient is not nervous/anxious.        /60   Pulse 103   Temp 98.5 °F (36.9 °C) (Oral)   Ht 174 cm (68.5\")   Wt 77.1 kg (170 lb)   SpO2 95%   BMI 25.47 kg/m²     Objective   Physical Exam   Constitutional: He is oriented to person, place, and time. He appears well-developed and well-nourished.   HENT:   Head: Normocephalic and atraumatic.   Nose: Nose normal.   Mouth/Throat: Oropharynx is clear and moist.   Eyes: Conjunctivae and EOM are normal. Pupils are equal, round, and reactive to light.   Neck: Normal range of motion. Neck supple. No tracheal deviation present. No thyromegaly present.   Cardiovascular: Normal rate, regular rhythm, normal heart sounds and intact distal pulses.   No murmur heard.  Pulmonary/Chest: Effort normal and breath sounds normal. No respiratory distress. He has no wheezes.   Abdominal: Soft. Bowel sounds are normal. There is no tenderness. There is no guarding.   Musculoskeletal: " Normal range of motion. He exhibits tenderness (lumbar musculature diffusely tender). He exhibits no edema.   Lymphadenopathy:     He has no cervical adenopathy.   Neurological: He is alert and oriented to person, place, and time.   Skin: Skin is warm and dry. No rash noted.   Psychiatric: He has a normal mood and affect. His behavior is normal.   Nursing note and vitals reviewed.      Assessment/Plan   Diagnoses and all orders for this visit:    Type 2 diabetes mellitus with peripheral neuropathy (CMS/HCC)  Comments:  He was encouraged to take xultophy 60mg qhs  He should do daily blood glucose log fasting and 2-hour postprandial readings and bring it to him for his follow-up    Essential hypertension  -     Comprehensive Metabolic Panel    Iron deficiency anemia due to chronic blood loss  -     CBC & Differential    Mechanical back pain    Bulging lumbar disc    Coronary artery disease involving native coronary artery of native heart without angina pectoris    Long-term current use of opiate analgesic  -     Urine Drug Screen - Urine, Clean Catch; Future    Other orders  -     albuterol sulfate  (90 Base) MCG/ACT inhaler; Inhale 2 puffs Every 6 (Six) Hours As Needed for Wheezing.  -     Manual Differential    Prescription written for oxycodone ER (OxyContin) 10 mg 1 tablet twice a day #60 with no refills.  Prescription sent electronically by Dr. Edy Kline.    [facility] #[id number] Reviewed and is consistent.  [facility] 12/4/18 reviewed and is consistent.    As part of the patient's treatment plan they are being prescribed a controlled substance/ substances with potential for abuse.  This patient has been made aware of the appropriate use of such medications, including potential risk of somnolence, limited ability to drive and/or work safely, and potential for overdose.  It has also been made clear these medications are for use by the patient only, without concomitant use of alcohol or other substances unless  prescribed/advised by medical provider.    Patient has completed prescribing agreement detailing terms of continued prescribing of controlled substances including monitoring DANAE reports, urine drug screens, and pill counts.  The patient is aware DANAE reports are reviewed on a regular basis and scanned into the chart.    History and physical exam exhibit continued safe and appropriate use of controlled substances.

## 2019-03-25 RX ORDER — CARVEDILOL 12.5 MG/1
12.5 TABLET ORAL 2 TIMES DAILY WITH MEALS
Qty: 60 TABLET | Refills: 5 | Status: SHIPPED | OUTPATIENT
Start: 2019-03-25

## 2019-03-25 RX ORDER — FUROSEMIDE 40 MG/1
40 TABLET ORAL EVERY MORNING
Qty: 30 TABLET | Refills: 5 | Status: SHIPPED | OUTPATIENT
Start: 2019-03-25 | End: 2019-10-18 | Stop reason: SDUPTHER

## 2019-04-08 RX ORDER — ESOMEPRAZOLE MAGNESIUM 40 MG/1
40 CAPSULE, DELAYED RELEASE ORAL DAILY
Qty: 30 CAPSULE | Refills: 5 | Status: SHIPPED | OUTPATIENT
Start: 2019-04-08 | End: 2019-10-11 | Stop reason: SDUPTHER

## 2019-04-09 ENCOUNTER — OFFICE VISIT (OUTPATIENT)
Dept: FAMILY MEDICINE CLINIC | Facility: CLINIC | Age: 69
End: 2019-04-09

## 2019-04-09 VITALS
SYSTOLIC BLOOD PRESSURE: 114 MMHG | BODY MASS INDEX: 25.62 KG/M2 | TEMPERATURE: 98.4 F | OXYGEN SATURATION: 98 % | HEART RATE: 67 BPM | WEIGHT: 173 LBS | HEIGHT: 69 IN | DIASTOLIC BLOOD PRESSURE: 60 MMHG

## 2019-04-09 DIAGNOSIS — I25.10 CORONARY ARTERY DISEASE INVOLVING NATIVE CORONARY ARTERY OF NATIVE HEART WITHOUT ANGINA PECTORIS: ICD-10-CM

## 2019-04-09 DIAGNOSIS — M54.9 MECHANICAL BACK PAIN: ICD-10-CM

## 2019-04-09 DIAGNOSIS — D50.9 IRON DEFICIENCY ANEMIA, UNSPECIFIED IRON DEFICIENCY ANEMIA TYPE: Primary | ICD-10-CM

## 2019-04-09 DIAGNOSIS — E11.42 TYPE 2 DIABETES MELLITUS WITH PERIPHERAL NEUROPATHY (HCC): ICD-10-CM

## 2019-04-09 DIAGNOSIS — M51.36 BULGING LUMBAR DISC: ICD-10-CM

## 2019-04-09 DIAGNOSIS — I10 ESSENTIAL HYPERTENSION: ICD-10-CM

## 2019-04-09 PROCEDURE — 36415 COLL VENOUS BLD VENIPUNCTURE: CPT | Performed by: PHYSICIAN ASSISTANT

## 2019-04-09 PROCEDURE — 99214 OFFICE O/P EST MOD 30 MIN: CPT | Performed by: PHYSICIAN ASSISTANT

## 2019-04-09 RX ORDER — OXYCODONE HYDROCHLORIDE 10 MG/1
10 TABLET ORAL 4 TIMES DAILY
Qty: 120 TABLET | Refills: 0 | Status: SHIPPED | OUTPATIENT
Start: 2019-04-09 | End: 2019-05-07 | Stop reason: SDUPTHER

## 2019-04-10 LAB
BASOPHILS # BLD AUTO: (no result) 10*3/UL
DIFFERENTIAL COMMENT: ABNORMAL
EOSINOPHIL # BLD AUTO: (no result) 10*3/UL
EOSINOPHIL # BLD MANUAL: 0.2 10*3/MM3 (ref 0–0.4)
EOSINOPHIL NFR BLD AUTO: (no result) %
EOSINOPHIL NFR BLD MANUAL: 2 % (ref 0.3–6.2)
ERYTHROCYTE [DISTWIDTH] IN BLOOD BY AUTOMATED COUNT: 20.8 % (ref 12.3–15.4)
FERRITIN SERPL-MCNC: 9.46 NG/ML (ref 30–400)
HCT VFR BLD AUTO: 34.1 % (ref 37.5–51)
HGB BLD-MCNC: 9.6 G/DL (ref 13–17.7)
IRON SATN MFR SERPL: 4 % (ref 20–50)
IRON SERPL-MCNC: 21 MCG/DL (ref 59–158)
LYMPHOCYTES # BLD AUTO: (no result) 10*3/UL
LYMPHOCYTES # BLD MANUAL: 2.21 10*3/MM3 (ref 0.7–3.1)
LYMPHOCYTES NFR BLD AUTO: (no result) %
LYMPHOCYTES NFR BLD MANUAL: 22.2 % (ref 19.6–45.3)
MCH RBC QN AUTO: 20.9 PG (ref 26.6–33)
MCHC RBC AUTO-ENTMCNC: 28.2 G/DL (ref 31.5–35.7)
MCV RBC AUTO: 74.1 FL (ref 79–97)
MONOCYTES # BLD MANUAL: 1.11 10*3/MM3 (ref 0.1–0.9)
MONOCYTES NFR BLD AUTO: (no result) %
MONOCYTES NFR BLD MANUAL: 11.1 % (ref 5–12)
NEUTROPHILS # BLD MANUAL: 6.43 10*3/MM3 (ref 1.4–7)
NEUTROPHILS NFR BLD AUTO: (no result) %
NEUTROPHILS NFR BLD MANUAL: 64.6 % (ref 42.7–76)
PLATELET # BLD AUTO: 348 10*3/MM3 (ref 140–450)
PLATELET BLD QL SMEAR: ABNORMAL
RBC # BLD AUTO: 4.6 10*6/MM3 (ref 4.14–5.8)
RBC MORPH BLD: ABNORMAL
TIBC SERPL-MCNC: 544 MCG/DL
UIBC SERPL-MCNC: 523 MCG/DL
WBC # BLD AUTO: 9.96 10*3/MM3 (ref 3.4–10.8)

## 2019-04-16 NOTE — PROGRESS NOTES
"Katie Zuñiga is a 68 y.o. male.     Chief complaint- anemia    History of Present Illness     Anemia-  He complains of fatigue and is due for cbc check.  He takes iron supplementation     Diabetes-  improved with xultophy 50 units qhs,  And metformin.  Lab Results   Component Value Date    HGBA1C 10.70 (H) 02/11/2019     Chronic back pain -  He complains of chronic lumbar back pain.  Pain is not related to a specific injury.  Symptoms include back pain,stiffness, and decreased ROJM.  He denies urinary incontinence or fecal incontinence.  Pain is located int he low back.  Radiation to left leg. Pain is described as varying from mild to severe burning and throbbing. Onset x years.  Symptoms exacerbated by standing and walking.  Symptoms alleviated by rest and opioid analgesics.  Associated symptoms include sexual dysfunction.  Pain causes functional limitation including general activity, mood, walking ability, work, and activities of daily living.    9/4/09 MRI lumbar spine = disc bulging L4/5 and L5/S1 with central protrusion of disc and facet arthropathy involving L3-S1    CAD - stable with aspirin and Nitrostat when necessary    Hypertension - stable with entresto and lasix    Pain Score    04/09/19 1450   PainSc:   7   PainLoc: Back       /60   Pulse 67   Temp 98.4 °F (36.9 °C) (Oral)   Ht 174 cm (68.5\")   Wt 78.5 kg (173 lb)   SpO2 98%   BMI 25.92 kg/m²     The following portions of the patient's history were reviewed and updated as appropriate: allergies, current medications, past family history, past medical history, past social history, past surgical history and problem list.    Review of Systems   Constitutional: Positive for fatigue. Negative for activity change, appetite change and fever.   HENT: Negative for ear pain, sinus pressure and sore throat.    Eyes: Negative for pain and visual disturbance.   Respiratory: Negative for cough and chest tightness.    Cardiovascular: Negative " "for chest pain and palpitations.   Gastrointestinal: Negative for abdominal pain, constipation, diarrhea, nausea and vomiting.   Endocrine: Negative for polydipsia and polyuria.   Genitourinary: Negative for dysuria and frequency.   Musculoskeletal: Positive for back pain. Negative for myalgias.   Skin: Negative for color change and rash.   Allergic/Immunologic: Negative for food allergies and immunocompromised state.   Neurological: Negative for dizziness, syncope and headaches.   Hematological: Negative for adenopathy. Does not bruise/bleed easily.   Psychiatric/Behavioral: Negative for hallucinations and suicidal ideas. The patient is not nervous/anxious.        /60   Pulse 67   Temp 98.4 °F (36.9 °C) (Oral)   Ht 174 cm (68.5\")   Wt 78.5 kg (173 lb)   SpO2 98%   BMI 25.92 kg/m²     Objective   Physical Exam   Constitutional: He is oriented to person, place, and time. He appears well-developed and well-nourished.   HENT:   Head: Normocephalic and atraumatic.   Nose: Nose normal.   Mouth/Throat: Oropharynx is clear and moist.   Eyes: Conjunctivae and EOM are normal. Pupils are equal, round, and reactive to light.   Neck: Normal range of motion. Neck supple. No tracheal deviation present. No thyromegaly present.   Cardiovascular: Normal rate, regular rhythm, normal heart sounds and intact distal pulses.   No murmur heard.  Pulmonary/Chest: Effort normal and breath sounds normal. No respiratory distress. He has no wheezes.   Abdominal: Soft. Bowel sounds are normal. There is no tenderness. There is no guarding.   Musculoskeletal: Normal range of motion. He exhibits tenderness (lumbar musculature diffusely tender). He exhibits no edema.   Lymphadenopathy:     He has no cervical adenopathy.   Neurological: He is alert and oriented to person, place, and time.   Skin: Skin is warm and dry. No rash noted.   Psychiatric: He has a normal mood and affect. His behavior is normal.   Nursing note and vitals " reviewed.      Assessment/Plan   Diagnoses and all orders for this visit:    Iron deficiency anemia, unspecified iron deficiency anemia type  -     CBC & Differential  -     Ferritin  -     Iron and TIBC    Type 2 diabetes mellitus with peripheral neuropathy (CMS/HCC)  Comments:  continue xultophy and metfomin.    He was advised to follow more strict diabetic diet    Bulging lumbar disc  Comments:  Prescription written for oxycodone 10 mg 4 times daily #120 with no refills by Dr. Edy Kline    Mechanical back pain  Comments:  Continue oxycodone and patient was encouraged to remain active and do daily stretches.  He is goals include improving quality of health by remaining active     Coronary artery disease involving native coronary artery of native heart without angina pectoris  Comments:  Advised to use Nitrostat as needed and report any shortness of breath, diaphoresis, or chest pain    Essential hypertension  Comments:  Continue entresto and lasix    Other orders  -     Manual Differential      [facility] #[id number] Reviewed and is consistent.  [facility] 3/5/19 reviewed and is consistent.    As part of the patient's treatment plan they are being prescribed a controlled substance/ substances with potential for abuse.  This patient has been made aware of the appropriate use of such medications, including potential risk of somnolence, limited ability to drive and/or work safely, and potential for overdose.  It has also been made clear these medications are for use by the patient only, without concomitant use of alcohol or other substances unless prescribed/advised by medical provider.    Patient has completed prescribing agreement detailing terms of continued prescribing of controlled substances including monitoring DANAE reports, urine drug screens, and pill counts.  The patient is aware DANAE reports are reviewed on a regular basis and scanned into the chart.    History and physical exam exhibit continued safe and  appropriate use of controlled substances.

## 2019-04-25 DIAGNOSIS — F41.9 CHRONIC ANXIETY: ICD-10-CM

## 2019-04-25 DIAGNOSIS — D50.9 IRON DEFICIENCY ANEMIA, UNSPECIFIED IRON DEFICIENCY ANEMIA TYPE: Primary | ICD-10-CM

## 2019-04-25 DIAGNOSIS — E11.42 TYPE 2 DIABETES MELLITUS WITH PERIPHERAL NEUROPATHY (HCC): ICD-10-CM

## 2019-04-25 RX ORDER — FERROUS SULFATE 325(65) MG
325 TABLET ORAL
Qty: 90 TABLET | Refills: 5 | Status: SHIPPED | OUTPATIENT
Start: 2019-04-25 | End: 2019-08-02

## 2019-04-25 RX ORDER — DULOXETIN HYDROCHLORIDE 60 MG/1
60 CAPSULE, DELAYED RELEASE ORAL DAILY
Qty: 30 CAPSULE | Refills: 5 | Status: SHIPPED | OUTPATIENT
Start: 2019-04-25 | End: 2019-05-01 | Stop reason: SDUPTHER

## 2019-04-25 NOTE — PROGRESS NOTES
He has not been taking any and he doesn't have any at home. Want me to send some to the pharmacy? Also she says he is very weak, worse than he was last time when he received his last transfusion, I suggested the ER but she said he will not go, should he come back in for more blood work?     -- Is he taking his iron/ferrous sulfate 3 times daily?   Inform him that he has lab work revealed iron deficiency anemia with a hemoglobin of only 9.6.  He needs to take his iron regularly if he is not.   Please let me know

## 2019-04-30 NOTE — PROGRESS NOTES
Yes he should start ferrous sulfate 325 mg twice daily.  Recheck CBC, ferritin, and TIBC in 2 weeks.

## 2019-05-01 DIAGNOSIS — F41.9 CHRONIC ANXIETY: ICD-10-CM

## 2019-05-01 DIAGNOSIS — E11.42 TYPE 2 DIABETES MELLITUS WITH PERIPHERAL NEUROPATHY (HCC): ICD-10-CM

## 2019-05-01 RX ORDER — DULOXETIN HYDROCHLORIDE 60 MG/1
60 CAPSULE, DELAYED RELEASE ORAL DAILY
Qty: 30 CAPSULE | Refills: 5 | Status: SHIPPED | OUTPATIENT
Start: 2019-05-01 | End: 2019-11-26 | Stop reason: SDUPTHER

## 2019-05-07 ENCOUNTER — OFFICE VISIT (OUTPATIENT)
Dept: FAMILY MEDICINE CLINIC | Facility: CLINIC | Age: 69
End: 2019-05-07

## 2019-05-07 VITALS
OXYGEN SATURATION: 95 % | BODY MASS INDEX: 25.03 KG/M2 | DIASTOLIC BLOOD PRESSURE: 70 MMHG | HEART RATE: 73 BPM | WEIGHT: 169 LBS | TEMPERATURE: 98.7 F | HEIGHT: 69 IN | SYSTOLIC BLOOD PRESSURE: 120 MMHG

## 2019-05-07 DIAGNOSIS — M54.9 MECHANICAL BACK PAIN: ICD-10-CM

## 2019-05-07 DIAGNOSIS — Z79.891 LONG-TERM CURRENT USE OF OPIATE ANALGESIC: ICD-10-CM

## 2019-05-07 DIAGNOSIS — M54.9 MECHANICAL BACK PAIN: Primary | ICD-10-CM

## 2019-05-07 DIAGNOSIS — I25.10 CORONARY ARTERY DISEASE INVOLVING NATIVE CORONARY ARTERY OF NATIVE HEART WITHOUT ANGINA PECTORIS: ICD-10-CM

## 2019-05-07 DIAGNOSIS — I10 ESSENTIAL HYPERTENSION: ICD-10-CM

## 2019-05-07 PROCEDURE — 99214 OFFICE O/P EST MOD 30 MIN: CPT | Performed by: PHYSICIAN ASSISTANT

## 2019-05-07 RX ORDER — OXYCODONE HYDROCHLORIDE 10 MG/1
10 TABLET ORAL 4 TIMES DAILY
Qty: 120 TABLET | Refills: 0 | Status: SHIPPED | OUTPATIENT
Start: 2019-05-07 | End: 2019-06-10 | Stop reason: SDUPTHER

## 2019-05-08 ENCOUNTER — OFFICE VISIT (OUTPATIENT)
Dept: CARDIOLOGY | Facility: CLINIC | Age: 69
End: 2019-05-08

## 2019-05-08 VITALS
OXYGEN SATURATION: 93 % | WEIGHT: 169 LBS | SYSTOLIC BLOOD PRESSURE: 110 MMHG | HEART RATE: 69 BPM | DIASTOLIC BLOOD PRESSURE: 48 MMHG | BODY MASS INDEX: 25.03 KG/M2 | HEIGHT: 69 IN

## 2019-05-08 DIAGNOSIS — E78.2 MIXED HYPERLIPIDEMIA: ICD-10-CM

## 2019-05-08 DIAGNOSIS — I50.22 CHRONIC SYSTOLIC CONGESTIVE HEART FAILURE (HCC): Primary | ICD-10-CM

## 2019-05-08 DIAGNOSIS — I25.10 CORONARY ARTERY DISEASE INVOLVING NATIVE CORONARY ARTERY OF NATIVE HEART WITHOUT ANGINA PECTORIS: ICD-10-CM

## 2019-05-08 DIAGNOSIS — N18.30 STAGE 3 CHRONIC KIDNEY DISEASE (HCC): ICD-10-CM

## 2019-05-08 PROCEDURE — 99214 OFFICE O/P EST MOD 30 MIN: CPT | Performed by: INTERNAL MEDICINE

## 2019-05-08 NOTE — PROGRESS NOTES
subjective     Chief Complaint   Patient presents with   • Hypotension   • Follow-up     History of Present Illness  Patient is 68 years old white male who is here for cardiology follow-up.  Patient has chronic systolic and diastolic heart failure with LV ejection fraction around 26%.  Patient had refused any device therapy.  Seen by me 6 months ago.  Patient had myocardial infarction and stroke in 2014 at [facility] at that time he was told that his ejection fraction is 10% and had LV apical thrombus and had been on Coumadin.  Patient refused ICD consideration or transplant.  Patient also states that he had coronary angiography done at  which showed no blockages and no intervention required.    Patient has been taking Entresto 24/26 twice daily  Coreg 12.5 twice daily  And Lasix 40 mg daily.  Patient also takes baby aspirin and Crestor 20 mg daily        Past Surgical History:   Procedure Laterality Date   • CARDIAC SURGERY      Open heart      Family History   Problem Relation Age of Onset   • Vision loss Other    • Coronary artery disease Other    • Diabetes Mother    • Arthritis Mother    • Heart attack Father    • Heart disease Sister    • Seizures Sister    • Heart disease Brother      Past Medical History:   Diagnosis Date   • Allergic rhinitis    • Anxiety    • CAD (coronary artery disease)    • CHF (congestive heart failure) (CMS/HCC)    • COPD (chronic obstructive pulmonary disease) (CMS/HCC)    • Diabetes mellitus (CMS/HCC)     TYPE ll   • Diverticulosis    • Erectile dysfunction    • Gastritis    • GERD (gastroesophageal reflux disease)    • Head injury 2/1/2018   • Hx of long-term (current) use of anticoagulants    • Hyperlipidemia    • Hypertension    • Iron deficiency    • Mechanical back pain    • Non-small cell carcinoma of lung (CMS/HCC)    • Smoker 8/8/2016   • Vitamin D deficiency      Patient Active Problem List   Diagnosis   • Essential hypertension   • Mixed hyperlipidemia   • CAD  (coronary artery disease)   • Chronic systolic congestive heart failure (CMS/HCC)   • COPD mixed type (CMS/HCC)   • Type 2 diabetes mellitus with peripheral neuropathy (CMS/[facility])   • Vitamin D deficiency   • Mechanical back pain   • Gastroesophageal reflux disease without esophagitis   • Chronic seasonal allergic rhinitis   • Chronic anxiety   • Diverticulosis of large intestine without hemorrhage   • Vasculogenic erectile dysfunction   • Smoker   • Healthcare maintenance   • Iron deficiency anemia due to chronic blood loss   • Bulging lumbar disc   • Stage 3 chronic kidney disease (CMS/[facility])       Social History     Tobacco Use   • Smoking status: Current Every Day Smoker     Packs/day: 0.50     Years: 50.00     Pack years: 25.00     Types: Cigarettes   • Smokeless tobacco: Never Used   Substance Use Topics   • Alcohol use: No   • Drug use: No       No Known Allergies    Current Outpatient Medications on File Prior to Visit   Medication Sig   • albuterol sulfate  (90 Base) MCG/ACT inhaler Inhale 2 puffs Every 6 (Six) Hours As Needed for Wheezing.   • aspirin 81 MG chewable tablet Chew 1 tablet daily.   • carvedilol (COREG) 12.5 MG tablet Take 1 tablet by mouth 2 (Two) Times a Day With Meals.   • DULoxetine (CYMBALTA) 60 MG capsule Take 1 capsule by mouth Daily.   • EASY COMFORT LANCETS misc USE TO TEST BLOOD SUGAR FOUR TIMES DAILY AS DIRECTED   • esomeprazole (nexIUM) 40 MG capsule Take 1 capsule by mouth Daily.   • ferrous sulfate 325 (65 FE) MG tablet Take 1 tablet by mouth 3 (Three) Times a Day With Meals.   • furosemide (LASIX) 40 MG tablet Take 1 tablet by mouth Every Morning. (Patient taking differently: Take 20 mg by mouth Every Morning.)   • Glucose Blood (BLOOD GLUCOSE TEST) strip 1 four times daily   • Insulin Degludec-Liraglutide (XULTOPHY) 100-3.6 UNIT-MG/ML solution pen-injector Inject 60 Units under the skin into the appropriate area as directed every night at bedtime.   • Insulin Pen Needle 32G  "X 4 MM misc 1 each 4 (Four) Times a Day.   • Insulin Syringe 28G X 1/2\" 0.5 ML misc 2 (two) times a day.   • metFORMIN (GLUCOPHAGE) 1000 MG tablet Take 1 tablet by mouth 2 (Two) Times a Day With Meals.   • montelukast (SINGULAIR) 10 MG tablet Take 1 tablet by mouth Daily.   • nitroglycerin (NITROSTAT) 0.4 MG SL tablet Place 1 tablet under the tongue Every 5 (Five) Minutes As Needed for Chest Pain. Take no more than 3 doses in 15 minutes.   • ondansetron (ZOFRAN) 4 MG tablet Take 1 tablet by mouth Every 8 (Eight) Hours As Needed for Nausea or Vomiting.   • oxyCODONE (ROXICODONE) 10 MG tablet Take 1 tablet by mouth 4 (Four) Times a Day.   • promethazine (PHENERGAN) 25 MG tablet Take 1 tablet by mouth Every 6 (Six) Hours As Needed for Nausea or Vomiting.   • rosuvastatin (CRESTOR) 20 MG tablet Take 1 tablet by mouth Daily.   • sacubitril-valsartan (ENTRESTO) 24-26 MG tablet Take 1 tablet by mouth 2 (Two) Times a Day.   • [DISCONTINUED] sacubitril-valsartan (ENTRESTO) 49-51 MG tablet Take 1 tablet by mouth Every 12 (Twelve) Hours.     No current facility-administered medications on file prior to visit.          The following portions of the patient's history were reviewed and updated as appropriate: allergies, current medications, past family history, past medical history, past social history, past surgical history and problem list.    Review of Systems   Constitution: Positive for weakness and malaise/fatigue.   HENT: Negative.    Cardiovascular: Positive for dyspnea on exertion. Negative for chest pain, claudication, cyanosis, irregular heartbeat, leg swelling, near-syncope, orthopnea, palpitations, paroxysmal nocturnal dyspnea and syncope.   Respiratory: Positive for shortness of breath. Negative for cough, sputum production and wheezing.           Objective:     /48 (BP Location: Left arm, Patient Position: Sitting)   Pulse 69   Ht 174 cm (68.5\")   Wt 76.7 kg (169 lb)   SpO2 93%   BMI 25.32 kg/m² "   Physical Exam   Constitutional: He appears well-developed and well-nourished. No distress.   HENT:   Head: Normocephalic and atraumatic.   Mouth/Throat: Oropharynx is clear and moist. No oropharyngeal exudate.   Eyes: Conjunctivae and EOM are normal. Pupils are equal, round, and reactive to light. No scleral icterus.   Neck: Normal range of motion. Neck supple. No JVD present. No tracheal deviation present. No thyromegaly present.   Cardiovascular: Normal rate, regular rhythm, normal heart sounds and intact distal pulses. PMI is not displaced. Exam reveals no gallop, no friction rub and no decreased pulses.   No murmur heard.  Pulses:       Carotid pulses are 3+ on the right side, and 3+ on the left side.       Radial pulses are 3+ on the right side, and 3+ on the left side.   Pulmonary/Chest: Effort normal and breath sounds normal. No respiratory distress. He has no wheezes. He has no rales. He exhibits no tenderness.   Abdominal: Soft. Bowel sounds are normal. He exhibits no distension, no abdominal bruit and no mass. There is no splenomegaly or hepatomegaly. There is no tenderness. There is no rebound and no guarding.   Musculoskeletal: Normal range of motion. He exhibits no edema, tenderness or deformity.   Lymphadenopathy:     He has no cervical adenopathy.   Neurological: He is alert. He has normal reflexes. No cranial nerve deficit. He exhibits normal muscle tone. Coordination normal.   Skin: Skin is warm and dry. No rash noted. He is not diaphoretic. No erythema.   Psychiatric: He has a normal mood and affect. His behavior is normal. Judgment and thought content normal.         Lab Review  Lab Results   Component Value Date     (L) 03/05/2019    K 4.6 03/05/2019    CL 96 (L) 03/05/2019    BUN 33 (H) 03/05/2019    CREATININE 1.82 (H) 03/05/2019    GLUCOSE 219 (H) 04/11/2017     (H) 03/05/2019    CALCIUM 10.3 (H) 03/05/2019    ALT 27 03/05/2019    ALKPHOS 66 03/05/2019    LABIL2 2.0 03/05/2019      No results found for: CKTOTAL  Lab Results   Component Value Date    WBC 9.96 04/09/2019    HGB 9.6 (L) 04/09/2019    HCT 34.1 (L) 04/09/2019     04/09/2019     Lab Results   Component Value Date    INR 1.06 12/12/2018    INR 1.89 (H) 05/11/2018    INR 2.04 (H) 02/09/2017     No results found for: MG  Lab Results   Component Value Date    TSH 2.506 05/11/2018     No results found for: BNP  Lab Results   Component Value Date    CHLPL 120 05/11/2018    CHOL 305 (H) 04/11/2017    TRIG 309 (H) 05/11/2018    HDL 35 (L) 05/11/2018    VLDL 61.8 05/11/2018    LDLHDL  04/11/2017      Comment:      Unable to calculate     Lab Results   Component Value Date    LDL 23 05/11/2018    LDL 17 01/30/2018       Procedures       I personally viewed and interpreted the patient's LAB data         Assessment:     1. Chronic systolic congestive heart failure (CMS/[facility])    2. Mixed hyperlipidemia    3. Coronary artery disease involving native coronary artery of native heart without angina pectoris    4. Stage 3 chronic kidney disease (CMS/[facility])          Plan:     Labs were reviewed.  Last lipid panel available is from May 11, 2018 patient may have newer results at Dr. Cortez however in the computer the last one is May 11, 2018.  Cholesterol is 120, triglycerides 309 and LDL 23.    Blood sugar is 439 reported in March 2019.  Creatinine is 1.82 with estimated GFR of 37      Although his sugar is very high he should be avoiding metformin.  But no changes were made.  Patient will discuss with Dr. Cortez.  Nephrology consult recommended.    From cardiac standpoint no change in therapy was made.  No recent lab work is available. there may be other lab work available at Dr. Cortez office    Patient still refuses device therapy.  He will continue Entresto and Coreg.    No Follow-up on file.

## 2019-05-08 NOTE — PROGRESS NOTES
"Katie Zuñiga is a 68 y.o. male.     Chief complaint- back pain    History of Present Illness     Chronic back pain -  He complains of chronic lumbar back pain.  Pain is not related to a specific injury.  Symptoms include back pain,stiffness, and decreased ROJM.  He denies urinary incontinence or fecal incontinence.  Pain is located int he low back.  Radiation to left leg. Pain is described as varying from mild to severe burning and throbbing. Onset x years.  Symptoms exacerbated by standing and walking.  Symptoms alleviated by rest and opioid analgesics.  Associated symptoms include sexual dysfunction.  Pain causes functional limitation including general activity, mood, walking ability, work, and activities of daily living.    9/4/09 MRI lumbar spine = disc bulging L4/5 and L5/S1 with central protrusion of disc and facet arthropathy involving L3-S1    CAD - stable with aspirin and Nitrostat when necessary    Hypertension - stable with entresto and lasix    Pain Score    05/07/19 1433   PainSc:   6   PainLoc: Back       /70   Pulse 73   Temp 98.7 °F (37.1 °C) (Oral)   Ht 174 cm (68.5\")   Wt 76.7 kg (169 lb)   SpO2 95%   BMI 25.32 kg/m²     The following portions of the patient's history were reviewed and updated as appropriate: allergies, current medications, past family history, past medical history, past social history, past surgical history and problem list.    Review of Systems   Constitutional: Positive for fatigue. Negative for activity change, appetite change and fever.   HENT: Negative for ear pain, sinus pressure and sore throat.    Eyes: Negative for pain and visual disturbance.   Respiratory: Negative for cough and chest tightness.    Cardiovascular: Negative for chest pain and palpitations.   Gastrointestinal: Negative for abdominal pain, constipation, diarrhea, nausea and vomiting.   Endocrine: Negative for polydipsia and polyuria.   Genitourinary: Negative for dysuria and " "frequency.   Musculoskeletal: Positive for back pain. Negative for myalgias.   Skin: Negative for color change and rash.   Allergic/Immunologic: Negative for food allergies and immunocompromised state.   Neurological: Negative for dizziness, syncope and headaches.   Hematological: Negative for adenopathy. Does not bruise/bleed easily.   Psychiatric/Behavioral: Negative for hallucinations and suicidal ideas. The patient is not nervous/anxious.        /70   Pulse 73   Temp 98.7 °F (37.1 °C) (Oral)   Ht 174 cm (68.5\")   Wt 76.7 kg (169 lb)   SpO2 95%   BMI 25.32 kg/m²     Objective   Physical Exam   Constitutional: He is oriented to person, place, and time. He appears well-developed and well-nourished.   HENT:   Head: Normocephalic and atraumatic.   Nose: Nose normal.   Mouth/Throat: Oropharynx is clear and moist.   Eyes: Conjunctivae and EOM are normal. Pupils are equal, round, and reactive to light.   Neck: Normal range of motion. Neck supple. No tracheal deviation present. No thyromegaly present.   Cardiovascular: Normal rate, regular rhythm, normal heart sounds and intact distal pulses.   No murmur heard.  Pulmonary/Chest: Effort normal and breath sounds normal. No respiratory distress. He has no wheezes.   Abdominal: Soft. Bowel sounds are normal. There is no tenderness. There is no guarding.   Musculoskeletal: Normal range of motion. He exhibits tenderness (lumbar musculature diffusely tender). He exhibits no edema.   Lymphadenopathy:     He has no cervical adenopathy.   Neurological: He is alert and oriented to person, place, and time.   Skin: Skin is warm and dry. No rash noted.   Psychiatric: He has a normal mood and affect. His behavior is normal.   Nursing note and vitals reviewed.      Assessment/Plan   Diagnoses and all orders for this visit:    Mechanical back pain  Comments:  Prescription oxycodone 10 mg #120 with no refills written by Dr. Raisa Kirk #[id number] reviewed and is " consistent    Coronary artery disease involving native coronary artery of native heart without angina pectoris  Comments:  Continue risk factor modification including low carbohydrate, low-cholesterol diet, aspirin, and Nitrostat as needed    Essential hypertension  Comments:  Continue Entresto and Lasix    Long-term current use of opiate analgesic  -     Urine Drug Screen - Urine, Clean Catch; Future      UDS 3/5/19 reviewed and is consistent.  Patient advised that I will no longer be prescribing chronic schedule II opioids after the end of this year.  Reviewed options including tapering off opioid therapy, changing to a schedule III opioid, or referral to pain management.  Patient will consider      As part of the patient's treatment plan they are being prescribed a controlled substance/ substances with potential for abuse.  This patient has been made aware of the appropriate use of such medications, including potential risk of somnolence, limited ability to drive and/or work safely, and potential for overdose.  It has also been made clear these medications are for use by the patient only, without concomitant use of alcohol or other substances unless prescribed/advised by medical provider.    Patient has completed prescribing agreement detailing terms of continued prescribing of controlled substances including monitoring DANAE reports, urine drug screens, and pill counts.  The patient is aware DANAE reports are reviewed on a regular basis and scanned into the chart.    History and physical exam exhibit continued safe and appropriate use of controlled substances.

## 2019-05-24 ENCOUNTER — TELEPHONE (OUTPATIENT)
Dept: FAMILY MEDICINE CLINIC | Facility: CLINIC | Age: 69
End: 2019-05-24

## 2019-05-24 NOTE — TELEPHONE ENCOUNTER
Called the patients home and let them know that his labs had not changed since 2017 and that if he still was not able to go to the bathroom to go to the hospital.

## 2019-05-28 ENCOUNTER — PRIOR AUTHORIZATION (OUTPATIENT)
Dept: FAMILY MEDICINE CLINIC | Facility: CLINIC | Age: 69
End: 2019-05-28

## 2019-06-06 ENCOUNTER — APPOINTMENT (OUTPATIENT)
Dept: GENERAL RADIOLOGY | Facility: HOSPITAL | Age: 69
End: 2019-06-06

## 2019-06-06 ENCOUNTER — APPOINTMENT (OUTPATIENT)
Dept: CT IMAGING | Facility: HOSPITAL | Age: 69
End: 2019-06-06

## 2019-06-06 ENCOUNTER — HOSPITAL ENCOUNTER (EMERGENCY)
Facility: HOSPITAL | Age: 69
Discharge: HOME | End: 2019-06-07
Attending: FAMILY MEDICINE | Admitting: FAMILY MEDICINE
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
## 2019-06-06 DIAGNOSIS — R42 DIZZINESS: ICD-10-CM

## 2019-06-06 PROCEDURE — 72131 CT LUMBAR SPINE W/O DYE: CPT

## 2019-06-06 PROCEDURE — 72131 CT LUMBAR SPINE W/O DYE: CPT | Performed by: RADIOLOGY

## 2019-06-06 PROCEDURE — 71045 X-RAY EXAM CHEST 1 VIEW: CPT

## 2019-06-06 PROCEDURE — 71045 X-RAY EXAM CHEST 1 VIEW: CPT | Performed by: RADIOLOGY

## 2019-06-06 PROCEDURE — 72128 CT CHEST SPINE W/O DYE: CPT

## 2019-06-06 PROCEDURE — 72128 CT CHEST SPINE W/O DYE: CPT | Performed by: RADIOLOGY

## 2019-06-06 PROCEDURE — 72125 CT NECK SPINE W/O DYE: CPT

## 2019-06-06 PROCEDURE — 72125 CT NECK SPINE W/O DYE: CPT | Performed by: RADIOLOGY

## 2019-06-06 PROCEDURE — 99283 EMERGENCY DEPT VISIT LOW MDM: CPT

## 2019-06-06 PROCEDURE — 73030 X-RAY EXAM OF SHOULDER: CPT

## 2019-06-06 PROCEDURE — 70450 CT HEAD/BRAIN W/O DYE: CPT | Performed by: RADIOLOGY

## 2019-06-06 PROCEDURE — 73030 X-RAY EXAM OF SHOULDER: CPT | Performed by: RADIOLOGY

## 2019-06-06 PROCEDURE — 70450 CT HEAD/BRAIN W/O DYE: CPT

## 2019-06-06 PROCEDURE — 93005 ELECTROCARDIOGRAM TRACING: CPT | Performed by: FAMILY MEDICINE

## 2019-06-07 VITALS
TEMPERATURE: 97.7 F | WEIGHT: 174 LBS | HEART RATE: 67 BPM | OXYGEN SATURATION: 98 % | DIASTOLIC BLOOD PRESSURE: 77 MMHG | BODY MASS INDEX: 26.37 KG/M2 | RESPIRATION RATE: 16 BRPM | HEIGHT: 68 IN | SYSTOLIC BLOOD PRESSURE: 136 MMHG

## 2019-06-10 ENCOUNTER — OFFICE VISIT (OUTPATIENT)
Dept: FAMILY MEDICINE CLINIC | Facility: CLINIC | Age: 69
End: 2019-06-10

## 2019-06-10 VITALS
TEMPERATURE: 98.7 F | WEIGHT: 165 LBS | OXYGEN SATURATION: 99 % | HEART RATE: 94 BPM | SYSTOLIC BLOOD PRESSURE: 105 MMHG | RESPIRATION RATE: 12 BRPM | DIASTOLIC BLOOD PRESSURE: 60 MMHG | HEIGHT: 68 IN | BODY MASS INDEX: 25.01 KG/M2

## 2019-06-10 VITALS
HEART RATE: 94 BPM | DIASTOLIC BLOOD PRESSURE: 60 MMHG | TEMPERATURE: 98.7 F | BODY MASS INDEX: 25.01 KG/M2 | RESPIRATION RATE: 12 BRPM | HEIGHT: 68 IN | SYSTOLIC BLOOD PRESSURE: 105 MMHG | OXYGEN SATURATION: 99 % | WEIGHT: 165 LBS

## 2019-06-10 DIAGNOSIS — J30.2 CHRONIC SEASONAL ALLERGIC RHINITIS: ICD-10-CM

## 2019-06-10 DIAGNOSIS — E11.42 TYPE 2 DIABETES MELLITUS WITH PERIPHERAL NEUROPATHY (HCC): ICD-10-CM

## 2019-06-10 DIAGNOSIS — K57.30 DIVERTICULOSIS OF LARGE INTESTINE WITHOUT HEMORRHAGE: ICD-10-CM

## 2019-06-10 DIAGNOSIS — F17.200 SMOKER: ICD-10-CM

## 2019-06-10 DIAGNOSIS — J44.9 COPD MIXED TYPE (HCC): ICD-10-CM

## 2019-06-10 DIAGNOSIS — I50.22 CHRONIC SYSTOLIC CONGESTIVE HEART FAILURE (HCC): ICD-10-CM

## 2019-06-10 DIAGNOSIS — F41.9 CHRONIC ANXIETY: ICD-10-CM

## 2019-06-10 DIAGNOSIS — K21.9 GASTROESOPHAGEAL REFLUX DISEASE WITHOUT ESOPHAGITIS: ICD-10-CM

## 2019-06-10 DIAGNOSIS — T14.8XXA ABRASION: ICD-10-CM

## 2019-06-10 DIAGNOSIS — I25.10 CORONARY ARTERY DISEASE INVOLVING NATIVE CORONARY ARTERY OF NATIVE HEART WITHOUT ANGINA PECTORIS: ICD-10-CM

## 2019-06-10 DIAGNOSIS — E55.9 VITAMIN D DEFICIENCY: ICD-10-CM

## 2019-06-10 DIAGNOSIS — I10 ESSENTIAL HYPERTENSION: ICD-10-CM

## 2019-06-10 DIAGNOSIS — N18.30 STAGE 3 CHRONIC KIDNEY DISEASE (HCC): ICD-10-CM

## 2019-06-10 DIAGNOSIS — N52.01 ERECTILE DYSFUNCTION DUE TO ARTERIAL INSUFFICIENCY: ICD-10-CM
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
## 2019-06-10 DIAGNOSIS — E78.2 MIXED HYPERLIPIDEMIA: Primary | ICD-10-CM

## 2019-06-10 DIAGNOSIS — D50.0 IRON DEFICIENCY ANEMIA DUE TO CHRONIC BLOOD LOSS: ICD-10-CM

## 2019-06-10 DIAGNOSIS — M54.9 MECHANICAL BACK PAIN: ICD-10-CM

## 2019-06-10 DIAGNOSIS — Z87.891 PERSONAL HISTORY OF NICOTINE DEPENDENCE: ICD-10-CM

## 2019-06-10 DIAGNOSIS — Z00.00 HEALTHCARE MAINTENANCE: ICD-10-CM

## 2019-06-10 DIAGNOSIS — R53.1 GENERALIZED WEAKNESS: Primary | ICD-10-CM

## 2019-06-10 PROCEDURE — 99214 OFFICE O/P EST MOD 30 MIN: CPT | Performed by: GENERAL PRACTICE

## 2019-06-10 PROCEDURE — 36415 COLL VENOUS BLD VENIPUNCTURE: CPT | Performed by: GENERAL PRACTICE

## 2019-06-10 RX ORDER — OXYCODONE HYDROCHLORIDE 10 MG/1
10 TABLET ORAL 4 TIMES DAILY
Qty: 120 TABLET | Refills: 0 | Status: SHIPPED | OUTPATIENT
Start: 2019-06-10 | End: 2019-07-09 | Stop reason: SDUPTHER
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
## 2019-06-11 LAB
25(OH)D3+25(OH)D2 SERPL-MCNC: 24.7 NG/ML (ref 30–100)
ALBUMIN SERPL-MCNC: 4.5 G/DL (ref 3.5–5.2)
ALBUMIN/GLOB SERPL: 1.7 G/DL
ALP SERPL-CCNC: 61 U/L (ref 39–117)
ALT SERPL-CCNC: 16 U/L (ref 1–41)
AST SERPL-CCNC: 10 U/L (ref 1–40)
BASOPHILS # BLD AUTO: (no result) 10*3/UL
BASOPHILS # BLD MANUAL: 0.1 10*3/MM3 (ref 0–0.2)
BASOPHILS NFR BLD MANUAL: 1 % (ref 0–1.5)
BILIRUB SERPL-MCNC: 0.4 MG/DL (ref 0.2–1.2)
BUN SERPL-MCNC: 24 MG/DL (ref 8–23)
BUN/CREAT SERPL: 14.5 (ref 7–25)
CALCIUM SERPL-MCNC: 10 MG/DL (ref 8.6–10.5)
CHLORIDE SERPL-SCNC: 95 MMOL/L (ref 98–107)
CHOLEST SERPL-MCNC: 125 MG/DL (ref 0–200)
CO2 SERPL-SCNC: 24.8 MMOL/L (ref 22–29)
CREAT SERPL-MCNC: 1.66 MG/DL (ref 0.76–1.27)
DIFFERENTIAL COMMENT: NORMAL
EOSINOPHIL # BLD AUTO: (no result) 10*3/UL
EOSINOPHIL NFR BLD AUTO: (no result) %
ERYTHROCYTE [DISTWIDTH] IN BLOOD BY AUTOMATED COUNT: 19.4 % (ref 12.3–15.4)
FERRITIN SERPL-MCNC: 8.95 NG/ML (ref 30–400)
GLOBULIN SER CALC-MCNC: 2.6 GM/DL
GLUCOSE SERPL-MCNC: 437 MG/DL (ref 65–99)
HBA1C MFR BLD: 12.41 % (ref 4.8–5.6)
HCT VFR BLD AUTO: 33.4 % (ref 37.5–51)
HDLC SERPL-MCNC: 31 MG/DL (ref 40–60)
HGB BLD-MCNC: 8.8 G/DL (ref 13–17.7)
IRON SERPL-MCNC: 17 MCG/DL (ref 59–158)
LDLC SERPL CALC-MCNC: 37 MG/DL (ref 0–100)
LYMPHOCYTES # BLD AUTO: (no result) 10*3/UL
LYMPHOCYTES # BLD MANUAL: 2.54 10*3/MM3 (ref 0.7–3.1)
LYMPHOCYTES NFR BLD AUTO: (no result) %
LYMPHOCYTES NFR BLD MANUAL: 25 % (ref 19.6–45.3)
MCH RBC QN AUTO: 18.1 PG (ref 26.6–33)
MCHC RBC AUTO-ENTMCNC: 26.3 G/DL (ref 31.5–35.7)
MCV RBC AUTO: 68.6 FL (ref 79–97)
MONOCYTES # BLD MANUAL: 0.61 10*3/MM3 (ref 0.1–0.9)
MONOCYTES NFR BLD AUTO: (no result) %
MONOCYTES NFR BLD MANUAL: 6 % (ref 5–12)
NEUTROPHILS # BLD MANUAL: 6.91 10*3/MM3 (ref 1.7–7)
NEUTROPHILS NFR BLD AUTO: (no result) %
NEUTROPHILS NFR BLD MANUAL: 68 % (ref 42.7–76)
PLATELET # BLD AUTO: 335 10*3/MM3 (ref 140–450)
PLATELET BLD QL SMEAR: NORMAL
POTASSIUM SERPL-SCNC: 4.5 MMOL/L (ref 3.5–5.2)
PROT SERPL-MCNC: 7.1 G/DL (ref 6–8.5)
RBC # BLD AUTO: 4.87 10*6/MM3 (ref 4.14–5.8)
RBC MORPH BLD: NORMAL
SODIUM SERPL-SCNC: 132 MMOL/L (ref 136–145)
TRIGL SERPL-MCNC: 285 MG/DL (ref 0–150)
TSH SERPL DL<=0.005 MIU/L-ACNC: 1.6 UIU/ML (ref 0.45–4.5)
VLDLC SERPL CALC-MCNC: 57 MG/DL
WBC # BLD AUTO: 10.16 10*3/MM3 (ref 3.4–10.8)

## 2019-06-18 NOTE — PROGRESS NOTES
He is currently in  which is why I haven't been able to get a hold of him. Tesha says he is taking his iron TID and 50 units of insulin.

## 2019-07-09 ENCOUNTER — OFFICE VISIT (OUTPATIENT)
Dept: FAMILY MEDICINE CLINIC | Facility: CLINIC | Age: 69
End: 2019-07-09

## 2019-07-09 VITALS
OXYGEN SATURATION: 99 % | SYSTOLIC BLOOD PRESSURE: 99 MMHG | HEIGHT: 68 IN | BODY MASS INDEX: 25.09 KG/M2 | DIASTOLIC BLOOD PRESSURE: 60 MMHG | HEART RATE: 82 BPM | TEMPERATURE: 98.1 F

## 2019-07-09 DIAGNOSIS — E78.2 MIXED HYPERLIPIDEMIA: ICD-10-CM

## 2019-07-09 DIAGNOSIS — M25.50 ARTHRALGIA, UNSPECIFIED JOINT: ICD-10-CM

## 2019-07-09 DIAGNOSIS — M54.9 MECHANICAL BACK PAIN: ICD-10-CM

## 2019-07-09 DIAGNOSIS — E11.42 TYPE 2 DIABETES MELLITUS WITH PERIPHERAL NEUROPATHY (HCC): ICD-10-CM

## 2019-07-09 DIAGNOSIS — F33.1 MODERATE EPISODE OF RECURRENT MAJOR DEPRESSIVE DISORDER (HCC): ICD-10-CM

## 2019-07-09 DIAGNOSIS — I63.50 CEREBROVASCULAR ACCIDENT (CVA) DUE TO OCCLUSION OF CEREBRAL ARTERY (HCC): Primary | ICD-10-CM

## 2019-07-09 DIAGNOSIS — I10 ESSENTIAL HYPERTENSION: ICD-10-CM

## 2019-07-09 DIAGNOSIS — D50.9 MICROCYTIC ANEMIA: ICD-10-CM

## 2019-07-09 PROCEDURE — 99496 TRANSJ CARE MGMT HIGH F2F 7D: CPT | Performed by: PHYSICIAN ASSISTANT

## 2019-07-09 RX ORDER — OXYCODONE HYDROCHLORIDE 10 MG/1
10 TABLET ORAL 4 TIMES DAILY
Qty: 120 TABLET | Refills: 0 | Status: SHIPPED | OUTPATIENT
Start: 2019-07-09 | End: 2019-08-14 | Stop reason: SDUPTHER

## 2019-07-09 NOTE — TELEPHONE ENCOUNTER
----- Message from SHIRA Maravilla sent at 7/9/2019  2:00 PM EDT -----   Get records from [facility] please
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
## 2019-07-10 ENCOUNTER — TELEPHONE (OUTPATIENT)
Dept: FAMILY MEDICINE CLINIC | Facility: CLINIC | Age: 69
End: 2019-07-10

## 2019-07-10 NOTE — TELEPHONE ENCOUNTER
--I received records from [facility]        --- Message from SHIRA Maravilla sent at 7/9/2019  2:00 PM EDT -----   Get records from [facility] please

## 2019-07-15 NOTE — PATIENT INSTRUCTIONS
Fall Prevention in the Home, Adult  Falls can cause injuries. They can happen to people of all ages. There are many things you can do to make your home safe and to help prevent falls. Ask for help when making these changes, if needed.  What actions can I take to prevent falls?  General Instructions  · Use good lighting in all rooms. Replace any light bulbs that burn out.  · Turn on the lights when you go into a dark area. Use night-lights.  · Keep items that you use often in easy-to-reach places. Lower the shelves around your home if necessary.  · Set up your furniture so you have a clear path. Avoid moving your furniture around.  · Do not have throw rugs and other things on the floor that can make you trip.  · Avoid walking on wet floors.  · If any of your floors are uneven, fix them.  · Add color or contrast paint or tape to clearly mikie and help you see:  ? Any grab bars or handrails.  ? First and last steps of stairways.  ? Where the edge of each step is.  · If you use a stepladder:  ? Make sure that it is fully opened. Do not climb a closed stepladder.  ? Make sure that both sides of the stepladder are locked into place.  ? Ask someone to hold the stepladder for you while you use it.  · If there are any pets around you, be aware of where they are.  What can I do in the bathroom?  · Keep the floor dry. Clean up any water that spills onto the floor as soon as it happens.  · Remove soap buildup in the tub or shower regularly.  · Use non-skid mats or decals on the floor of the tub or shower.  · Attach bath mats securely with double-sided, non-slip rug tape.  · If you need to sit down in the shower, use a plastic, non-slip stool.  · Install grab bars by the toilet and in the tub and shower. Do not use towel bars as grab bars.  What can I do in the bedroom?  · Make sure that you have a light by your bed that is easy to reach.  · Do not use any sheets or blankets that are too big for your bed. They should not hang  down onto the floor.  · Have a firm chair that has side arms. You can use this for support while you get dressed.  What can I do in the kitchen?  · Clean up any spills right away.  · If you need to reach something above you, use a strong step stool that has a grab bar.  · Keep electrical cords out of the way.  · Do not use floor polish or wax that makes floors slippery. If you must use wax, use non-skid floor wax.  What can I do with my stairs?  · Do not leave any items on the stairs.  · Make sure that you have a light switch at the top of the stairs and the bottom of the stairs. If you do not have them, ask someone to add them for you.  · Make sure that there are handrails on both sides of the stairs, and use them. Fix handrails that are broken or loose. Make sure that handrails are as long as the stairways.  · Install non-slip stair treads on all stairs in your home.  · Avoid having throw rugs at the top or bottom of the stairs. If you do have throw rugs, attach them to the floor with carpet tape.  · Choose a carpet that does not hide the edge of the steps on the stairway.  · Check any carpeting to make sure that it is firmly attached to the stairs. Fix any carpet that is loose or worn.  What can I do on the outside of my home?  · Use bright outdoor lighting.  · Regularly fix the edges of walkways and driveways and fix any cracks.  · Remove anything that might make you trip as you walk through a door, such as a raised step or threshold.  · Trim any bushes or trees on the path to your home.  · Regularly check to see if handrails are loose or broken. Make sure that both sides of any steps have handrails.  · Install guardrails along the edges of any raised decks and porches.  · Clear walking paths of anything that might make someone trip, such as tools or rocks.  · Have any leaves, snow, or ice cleared regularly.  · Use sand or salt on walking paths during winter.  · Clean up any spills in your garage right away.  This includes grease or oil spills.  What other actions can I take?  · Wear shoes that:  ? Have a low heel. Do not wear high heels.  ? Have rubber bottoms.  ? Are comfortable and fit you well.  ? Are closed at the toe. Do not wear open-toe sandals.  · Use tools that help you move around (mobility aids) if they are needed. These include:  ? Canes.  ? Walkers.  ? Scooters.  ? Crutches.  · Review your medicines with your doctor. Some medicines can make you feel dizzy. This can increase your chance of falling.  Ask your doctor what other things you can do to help prevent falls.  Where to find more information  · Centers for Disease Control and Prevention, STEAD[employer]: https://cdc.gov  · [employer]: https://xz2knlj.rogers.nih.gov  Contact a doctor if:  · You are afraid of falling at home.  · You feel weak, drowsy, or dizzy at home.  · You fall at home.  Summary  · There are many simple things that you can do to make your home safe and to help prevent falls.  · Ways to make your home safe include removing tripping hazards and installing grab bars in the bathroom.  · Ask for help when making these changes in your home.  This information is not intended to replace advice given to you by your health care provider. Make sure you discuss any questions you have with your health care provider.  Document Released: 10/14/2010 Document Revised: 08/02/2018 Document Reviewed: 08/02/2018  [employer] Interactive Patient Education © 2019 [employer]  Carbohydrate Counting for Diabetes Mellitus, Adult  Carbohydrate counting is a method of keeping track of how many carbohydrates you eat. Eating carbohydrates naturally increases the amount of sugar (glucose) in the blood. Counting how many carbohydrates you eat helps keep your blood glucose within normal limits, which helps you manage your diabetes (diabetes mellitus).  It is important to know how many carbohydrates you can safely have in each meal. This is different for every  "person. A diet and nutrition specialist (registered dietitian) can help you make a meal plan and calculate how many carbohydrates you should have at each meal and snack.  Carbohydrates are found in the following foods:  · Grains, such as breads and cereals.  · Dried beans and soy products.  · Starchy vegetables, such as potatoes, peas, and corn.  · Fruit and fruit juices.  · Milk and yogurt.  · Sweets and snack foods, such as cake, cookies, candy, chips, and soft drinks.    How do I count carbohydrates?  There are two ways to count carbohydrates in food. You can use either of the methods or a combination of both.  Reading \"Nutrition Facts\" on packaged food  The \"Nutrition Facts\" list is included on the labels of almost all packaged foods and beverages in the [place] It includes:  · The serving size.  · Information about nutrients in each serving, including the grams (g) of carbohydrate per serving.    To use the “Nutrition Facts\":  · Decide how many servings you will have.  · Multiply the number of servings by the number of carbohydrates per serving.  · The resulting number is the total amount of carbohydrates that you will be having.    Learning standard serving sizes of other foods  When you eat carbohydrate foods that are not packaged or do not include \"Nutrition Facts\" on the label, you need to measure the servings in order to count the amount of carbohydrates:  · Measure the foods that you will eat with a food scale or measuring cup, if needed.  · Decide how many standard-size servings you will eat.  · Multiply the number of servings by 15. Most carbohydrate-rich foods have about 15 g of carbohydrates per serving.  ? For example, if you eat 8 oz (170 g) of strawberries, you will have eaten 2 servings and 30 g of carbohydrates (2 servings x 15 g = 30 g).  · For foods that have more than one food mixed, such as soups and casseroles, you must count the carbohydrates in each food that is included.    The following " list contains standard serving sizes of common carbohydrate-rich foods. Each of these servings has about 15 g of carbohydrates:  · ½ hamburger bun or ½ English muffin.  · ½ oz (15 mL) syrup.  · ½ oz (14 g) jelly.  · 1 slice of bread.  · 1 six-inch tortilla.  · 3 oz (85 g) cooked rice or pasta.  · 4 oz (113 g) cooked dried beans.  · 4 oz (113 g) starchy vegetable, such as peas, corn, or potatoes.  · 4 oz (113 g) hot cereal.  · 4 oz (113 g) mashed potatoes or ¼ of a large baked potato.  · 4 oz (113 g) canned or frozen fruit.  · 4 oz (120 mL) fruit juice.  · 4-6 crackers.  · 6 chicken nuggets.  · 6 oz (170 g) unsweetened dry cereal.  · 6 oz (170 g) plain fat-free yogurt or yogurt sweetened with artificial sweeteners.  · 8 oz (240 mL) milk.  · 8 oz (170 g) fresh fruit or one small piece of fruit.  · 24 oz (680 g) popped popcorn.    Example of carbohydrate counting  Sample meal  · 3 oz (85 g) chicken breast.  · 6 oz (170 g) brown rice.  · 4 oz (113 g) corn.  · 8 oz (240 mL) milk.  · 8 oz (170 g) strawberries with sugar-free whipped topping.  Carbohydrate calculation  1. Identify the foods that contain carbohydrates:  ? Rice.  ? Corn.  ? Milk.  ? Strawberries.  2. Calculate how many servings you have of each food:  ? 2 servings rice.  ? 1 serving corn.  ? 1 serving milk.  ? 1 serving strawberries.  3. Multiply each number of servings by 15 g:  ? 2 servings rice x 15 g = 30 g.  ? 1 serving corn x 15 g = 15 g.  ? 1 serving milk x 15 g = 15 g.  ? 1 serving strawberries x 15 g = 15 g.  4. Add together all of the amounts to find the total grams of carbohydrates eaten:  ? 30 g + 15 g + 15 g + 15 g = 75 g of carbohydrates total.  Summary  · Carbohydrate counting is a method of keeping track of how many carbohydrates you eat.  · Eating carbohydrates naturally increases the amount of sugar (glucose) in the blood.  · Counting how many carbohydrates you eat helps keep your blood glucose within normal limits, which helps you manage  your diabetes.  · A diet and nutrition specialist (registered dietitian) can help you make a meal plan and calculate how many carbohydrates you should have at each meal and snack.  This information is not intended to replace advice given to you by your health care provider. Make sure you discuss any questions you have with your health care provider.  Document Released: 12/18/2006 Document Revised: 06/27/2018 Document Reviewed: 05/31/2017  [employer] Interactive Patient Education © 2019 [employer] Inc.

## 2019-07-31 ENCOUNTER — TELEPHONE (OUTPATIENT)
Dept: FAMILY MEDICINE CLINIC | Facility: CLINIC | Age: 69
End: 2019-07-31

## 2019-07-31 NOTE — TELEPHONE ENCOUNTER
Pt is aware.   ----- Message from Edy Kline MD sent at 7/31/2019  4:07 PM EDT -----  He can be scheduled to see me at 10 and we will draw and necessary labs at the same time    ----- Message -----  From: Jessica Barnes MA  Sent: 7/31/2019   4:05 PM  To: Edy Kline MD    Patient's wife called and stated that gagandeep was really weak and didn't really act like his self. She thinks his blood may be to low again. Can they bring him in tomorrow for labs?

## 2019-08-01 ENCOUNTER — OFFICE VISIT (OUTPATIENT)
Dept: FAMILY MEDICINE CLINIC | Facility: CLINIC | Age: 69
End: 2019-08-01

## 2019-08-01 VITALS
TEMPERATURE: 98.7 F | RESPIRATION RATE: 12 BRPM | HEART RATE: 91 BPM | DIASTOLIC BLOOD PRESSURE: 55 MMHG | HEIGHT: 68 IN | BODY MASS INDEX: 25.1 KG/M2 | SYSTOLIC BLOOD PRESSURE: 100 MMHG | OXYGEN SATURATION: 99 %

## 2019-08-01 DIAGNOSIS — E55.9 VITAMIN D DEFICIENCY: ICD-10-CM

## 2019-08-01 DIAGNOSIS — N18.30 STAGE 3 CHRONIC KIDNEY DISEASE (HCC): ICD-10-CM

## 2019-08-01 DIAGNOSIS — I63.231 CEREBRAL INFARCTION DUE TO OCCLUSION OF RIGHT INTERNAL CAROTID ARTERY (HCC): ICD-10-CM

## 2019-08-01 DIAGNOSIS — K57.30 DIVERTICULOSIS OF LARGE INTESTINE WITHOUT HEMORRHAGE: ICD-10-CM

## 2019-08-01 DIAGNOSIS — J30.2 CHRONIC SEASONAL ALLERGIC RHINITIS: ICD-10-CM

## 2019-08-01 DIAGNOSIS — F41.9 CHRONIC ANXIETY: ICD-10-CM

## 2019-08-01 DIAGNOSIS — D50.0 IRON DEFICIENCY ANEMIA DUE TO CHRONIC BLOOD LOSS: ICD-10-CM

## 2019-08-01 DIAGNOSIS — K21.9 GASTROESOPHAGEAL REFLUX DISEASE WITHOUT ESOPHAGITIS: ICD-10-CM

## 2019-08-01 DIAGNOSIS — Z00.00 HEALTHCARE MAINTENANCE: ICD-10-CM

## 2019-08-01 DIAGNOSIS — I50.22 CHRONIC SYSTOLIC CONGESTIVE HEART FAILURE (HCC): ICD-10-CM

## 2019-08-01 DIAGNOSIS — F17.200 SMOKER: ICD-10-CM

## 2019-08-01 DIAGNOSIS — J44.9 COPD MIXED TYPE (HCC): ICD-10-CM

## 2019-08-01 DIAGNOSIS — I10 ESSENTIAL HYPERTENSION: ICD-10-CM

## 2019-08-01 DIAGNOSIS — E11.42 TYPE 2 DIABETES MELLITUS WITH PERIPHERAL NEUROPATHY (HCC): ICD-10-CM

## 2019-08-01 DIAGNOSIS — I25.10 CORONARY ARTERY DISEASE INVOLVING NATIVE CORONARY ARTERY OF NATIVE HEART WITHOUT ANGINA PECTORIS: ICD-10-CM

## 2019-08-01 DIAGNOSIS — E78.2 MIXED HYPERLIPIDEMIA: Primary | ICD-10-CM
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
## 2019-08-01 PROBLEM — R53.1 GENERALIZED WEAKNESS: Status: RESOLVED | Noted: 2019-06-10 | Resolved: 2019-08-01

## 2019-08-01 PROBLEM — T14.8XXA ABRASION: Status: RESOLVED | Noted: 2019-06-10 | Resolved: 2019-08-01

## 2019-08-01 PROCEDURE — 99214 OFFICE O/P EST MOD 30 MIN: CPT | Performed by: GENERAL PRACTICE

## 2019-08-01 RX ORDER — CLOPIDOGREL BISULFATE 75 MG/1
75 TABLET ORAL DAILY
Qty: 30 TABLET | Refills: 5 | Status: SHIPPED | OUTPATIENT
Start: 2019-08-01 | End: 2020-01-01

## 2019-08-01 RX ORDER — NICOTINE 21 MG/24HR
1 PATCH, TRANSDERMAL 24 HOURS TRANSDERMAL EVERY 24 HOURS
Qty: 28 PATCH | Refills: 2 | Status: SHIPPED | OUTPATIENT
Start: 2019-08-01 | End: 2019-08-22

## 2019-08-01 NOTE — PROGRESS NOTES
Katie Zuñiga is a 68 y.o. male.     History of Present Illness     Right Cerebral Infarcts  Recently hospitalized at [facility] with acute watershed infarcts of the right hemisphere contributed to an 80% stenosis of the right ICA.  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  Echocardiogram was reported as showing borderline left ventricular dilation with inferior wall akinesis, moderate septal hypokinesis, an estimated ejection fraction of 40 to 50%, and right ventricular dilation.  No mural thrombus was noted.  He was ultimately discharged on his preadmission medications with the addition of clopidogrel and a change in xultophy to insulin detemir 45 daily and insulin aspartame according to a sliding scale with meals.  He attended rehabilitation at [facility] and has now been home for about a month.  He continues in physical therapy through home health and is able to ambulate within the home with a cane.  He admits to persistent left-sided weakness.  He was scheduled to undergo a neurosurgical reassessment last week regarding possible stenting of the right ICA but was unable to follow-up with this.  His wife intends to reschedule this appointment.  He has resumed smoking    Congestive Heart Failure  Patient has a history of congestive heart failure previously complicated by an apical mural thrombus treated with Coumadin. Patient remains short of breath with exertion,with occasional palpitations, lightheadedness, and fatigue. He denies dyspnea at rest, orthopnea, paroxysmal nocturnal dyspnea, or chest pain. Current medications include entresto, carvedilol,  furosemide.  He states he is compliant most of the time with his medications. He states he is noncompliant most of the time with his diet.  He continues to be followed by cardiology and is scheduled to undergo a reassessment with Dr. Marcus on 11/5/2019    Coronary artery disease  He has a history of CABG and CHF.  Previous diagnostic testing includes: cardiac catheterization Recent history: taking medications as instructed, no medication side effects noted, no TIA's, no chest pain on exertion, notes increased dyspnea on exertion, no change and no swelling of ankles. Medication side effects include: none.     Diabetes  Current symptoms include none. Patient denies paresthesia of the feet, visual disturbances, polydipsia, polyuria, hypoglycemia and foot ulcerations. Evaluation to date has been: hemoglobin A1C. Home sugars: BGs are running  consistent with Hgb A1C. Current treatments: insulin detemir 45 daily, insulin aspartame 3 times daily with meals according to sliding scale  Lab Results   Component Value Date    HGBA1C 12.41 (H) 06/10/2019      Dyslipidemia  Compliance with treatment has been poor. The patient exercises rarely. He is currently being prescribed the following medication for his dyslipidemia - rosuvastatin.  Lab Results   Component Value Date    CHOL 305 (H) 04/11/2017    CHLPL 125 06/10/2019    TRIG 285 (H) 06/10/2019    HDL 31 (L) 06/10/2019    LDL 37 06/10/2019     Chronic Renal Failure  Patient returns with a history of chronic renal disease caused by diabetic nephropathy and hypertensive nephrosclerosis. Treatments have been prescribed for slowing the progression of CRF include avoid NSAIDs.The patient has experienced  constipation, dyspnea, palpitations, fatigue and weakness.The patient has not experienced any nausea, anorexia, pruritis, PND, orthopnea, edema, chest pain, cognitive impairment, dysuria and hematuria. Patient is not followed by nephrology.  Lab Results   Component Value Date    CREATININE 1.66 (H) 06/10/2019     Lab Results   Component Value Date    K 4.5 06/10/2019     COPD  He admits to persistent cough associated with shortness of breath on exertion and intermittent wheezing. He states that these symptoms occur at any time during the day or night. He reports that his symptoms become  worse with activity. His symptoms are reduced with rest and with inhaled inhaled medication. The patient states he also has nasal congestion. He is following the treatment plan, including medications as directed. He currently smokes approximately 1 packs per day. Updated CT of the chest performed on 2/26/18 was reported as showing emphysematous changes as well as a 4 mm CHENCHO nodule.  Chest x-ray performed on 6/6/2019 was reported as showing cardiomegaly, small bilateral pleural effusions, bibasilar atelectasis, and prominent interstitial markings.    Labs  Lab Results   Component Value Date    WBC 10.16 06/10/2019    HGB 8.8 (L) 06/10/2019    HCT 33.4 (L) 06/10/2019    MCV 68.6 (L) 06/10/2019     06/10/2019     Lab Results   Component Value Date    IRON 43 (L) 04/11/2017    TIBC 544 04/09/2019    FERRITIN 8.95 (L) 06/10/2019     The following portions of the patient's history were reviewed and updated as appropriate: allergies, current medications, past medical history, past social history and problem list.    Review of Systems   Constitutional: Positive for fatigue. Negative for appetite change, chills, fever and unexpected weight change.   HENT: Positive for rhinorrhea and sneezing. Negative for congestion, ear pain, postnasal drip, sinus pressure, sore throat and voice change.    Eyes: Negative for visual disturbance.   Respiratory: Positive for cough, shortness of breath and wheezing.    Cardiovascular: Positive for palpitations. Negative for chest pain and leg swelling.   Gastrointestinal: Positive for constipation. Negative for abdominal pain, blood in stool, diarrhea, nausea and vomiting.   Endocrine: Negative for polydipsia and polyuria.   Genitourinary: Negative for difficulty urinating, dysuria, frequency, hematuria and urgency.   Musculoskeletal: Positive for arthralgias and back pain. Negative for joint swelling, myalgias and neck pain.   Skin: Negative for color change.   Neurological: Positive  for dizziness, weakness (generalized) and headaches. Negative for tremors and numbness.   Psychiatric/Behavioral: Negative for dysphoric mood, sleep disturbance and suicidal ideas. The patient is not nervous/anxious.      Objective   Physical Exam   Constitutional: He is oriented to person, place, and time. He appears well-developed and well-nourished. He is cooperative. He does not have a sickly appearance. No distress.   Accompanied by his wife. Sitting in a wheelchair.  Bright and in fair spirits. Appeared older than stated age, chronically ill, and pale. No apparent distress.  No cyanosis, diaphoresis, or jaundice.   HENT:   Head: Atraumatic.   Right Ear: Tympanic membrane, external ear and ear canal normal.   Left Ear: Tympanic membrane, external ear and ear canal normal.   Mouth/Throat: Oropharynx is clear and moist.   Eyes: EOM are normal. Pupils are equal, round, and reactive to light. No scleral icterus.   Neck: No JVD present. Carotid bruit is not present. No tracheal deviation present. No thyromegaly present.   Cardiovascular: Normal rate, regular rhythm, S1 normal, S2 normal, normal heart sounds and intact distal pulses. Exam reveals no gallop.   No murmur heard.  Pulmonary/Chest: No accessory muscle usage. No respiratory distress. He has decreased breath sounds in the right lower field and the left lower field. He has wheezes (diffuse-primarily with forced expiration). He has no rales.   Mild pulmonary hyperinflation   Abdominal: Soft. Normal aorta and bowel sounds are normal. He exhibits no abdominal bruit and no mass. There is no hepatosplenomegaly. There is no tenderness. No hernia.   Musculoskeletal: He exhibits no deformity.   No peripheral joint redness or warmth   Lymphadenopathy:        Head (right side): No submandibular adenopathy present.        Head (left side): No submandibular adenopathy present.     He has no cervical adenopathy.   Neurological: He is alert and oriented to person, place,  and time. He displays no tremor. A sensory deficit (decreased vibration sense both feet) is present. No cranial nerve deficit. He exhibits abnormal muscle tone (mild - right upper extremity). Coordination abnormal.   Mild weakness right upper extremity   Skin: Skin is warm and dry. No rash noted. He is not diaphoretic. No pallor. Nails show no clubbing.   Psychiatric: He has a normal mood and affect. His behavior is normal.     Assessment/Plan   Problems Addressed this Visit        Cardiovascular and Mediastinum    Essential hypertension   Hypertension: blood pressure somewhat low today. Evidence of target organ damage: coronary artery disease, heart failure, chronic kidney disease, stroke and right ICA stenosis.  Encouraged to continue to work on diet and exercise plan.   Continue current medication    Mixed hyperlipidemia   As above.   Continue current medication.  Updated labs drawn.    Relevant Orders    Comprehensive Metabolic Panel    Lipid Panel    CAD (coronary artery disease)  Reminded regarding risk factor modification with an emphasis on tobacco cessation.  Continue current medication  We will arrange an earlier reassessment with cardiology    Relevant Medications    clopidogrel (PLAVIX) 75 MG tablet    Chronic systolic congestive heart failure (CMS/HCC)  As above.    Relevant Medications    clopidogrel (PLAVIX) 75 MG tablet    Cerebral infarction due to occlusion of right internal carotid artery (CMS/HCC)  As above.  Encouraged to follow-up with neurosurgery and to report if he should have any difficulty doing so    Relevant Medications    clopidogrel (PLAVIX) 75 MG tablet       Respiratory    COPD mixed type (CMS/HCC)   COPD is stable.  Reminded of the importance of smoking cessation  Encouraged to remain as active as symptoms allow for    Chronic seasonal allergic rhinitis       Digestive    Vitamin D deficiency    Gastroesophageal reflux disease without esophagitis    Diverticulosis of large intestine  without hemorrhage       Endocrine    Type 2 diabetes mellitus with peripheral neuropathy (CMS/HCC)  Diabetes mellitus Type II, under inadequate control.   Encouraged to continue to pursue ADA diet  Encouraged aerobic exercise.  Continue current medication for now    Relevant Medications    insulin detemir (LEVEMIR) 100 UNIT/ML injection    insulin aspart (novoLOG FLEXPEN) 100 UNIT/ML solution pen-injector sc pen       Genitourinary    Stage 3 chronic kidney disease (CMS/HCC)       Hematopoietic and Hemostatic    Iron deficiency anemia due to chronic blood loss  Will continue to monitor    Relevant Orders    CBC & Differential    Iron    Transferrin    Ferritin       Other    Chronic anxiety    Smoker  Reminded of the importance of smoking cessation and the options with respect to this.  Agreed on a resumption of topical nicotine replacement    Relevant Medications    nicotine (NICODERM CQ) 21 MG/24HR patch    Healthcare maintenance  Reminded to get a flu shot when available.  We will discuss a hepatitis A #2, a pneumovax 23, and also reschedule an updated low-dose CT of the chest at his return

## 2019-08-02 LAB
ALBUMIN SERPL-MCNC: 4.4 G/DL (ref 3.5–5.2)
ALBUMIN/GLOB SERPL: 1.8 G/DL
ALP SERPL-CCNC: 58 U/L (ref 39–117)
ALT SERPL-CCNC: 41 U/L (ref 1–41)
AST SERPL-CCNC: 17 U/L (ref 1–40)
BASOPHILS # BLD AUTO: (no result) 10*3/UL
BILIRUB SERPL-MCNC: 0.2 MG/DL (ref 0.2–1.2)
BUN SERPL-MCNC: 24 MG/DL (ref 8–23)
BUN/CREAT SERPL: 17 (ref 7–25)
CALCIUM SERPL-MCNC: 9.7 MG/DL (ref 8.6–10.5)
CHLORIDE SERPL-SCNC: 98 MMOL/L (ref 98–107)
CHOLEST SERPL-MCNC: 105 MG/DL (ref 0–200)
CO2 SERPL-SCNC: 24.1 MMOL/L (ref 22–29)
CREAT SERPL-MCNC: 1.41 MG/DL (ref 0.76–1.27)
DIFFERENTIAL COMMENT: ABNORMAL
EOSINOPHIL # BLD AUTO: (no result) 10*3/UL
EOSINOPHIL NFR BLD AUTO: (no result) %
ERYTHROCYTE [DISTWIDTH] IN BLOOD BY AUTOMATED COUNT: 22.6 % (ref 12.3–15.4)
FERRITIN SERPL-MCNC: 8.53 NG/ML (ref 30–400)
GLOBULIN SER CALC-MCNC: 2.5 GM/DL
GLUCOSE SERPL-MCNC: 327 MG/DL (ref 65–99)
HCT VFR BLD AUTO: 35.9 % (ref 37.5–51)
HDLC SERPL-MCNC: 34 MG/DL (ref 40–60)
HGB BLD-MCNC: 9.3 G/DL (ref 13–17.7)
IRON SERPL-MCNC: 15 MCG/DL (ref 59–158)
LDLC SERPL CALC-MCNC: 27 MG/DL (ref 0–100)
LYMPHOCYTES # BLD AUTO: (no result) 10*3/UL
LYMPHOCYTES # BLD MANUAL: 1.52 10*3/MM3 (ref 0.7–3.1)
LYMPHOCYTES NFR BLD AUTO: (no result) %
LYMPHOCYTES NFR BLD MANUAL: 16.3 % (ref 19.6–45.3)
MCH RBC QN AUTO: 19 PG (ref 26.6–33)
MCHC RBC AUTO-ENTMCNC: 25.9 G/DL (ref 31.5–35.7)
MCV RBC AUTO: 73.3 FL (ref 79–97)
MONOCYTES # BLD MANUAL: 0.09 10*3/MM3 (ref 0.1–0.9)
MONOCYTES NFR BLD AUTO: (no result) %
MONOCYTES NFR BLD MANUAL: 1 % (ref 5–12)
NEUTROPHILS # BLD MANUAL: 7.71 10*3/MM3 (ref 1.7–7)
NEUTROPHILS NFR BLD AUTO: (no result) %
NEUTROPHILS NFR BLD MANUAL: 82.7 % (ref 42.7–76)
PLATELET # BLD AUTO: 387 10*3/MM3 (ref 140–450)
PLATELET BLD QL SMEAR: ABNORMAL
POTASSIUM SERPL-SCNC: 4.3 MMOL/L (ref 3.5–5.2)
PROT SERPL-MCNC: 6.9 G/DL (ref 6–8.5)
RBC # BLD AUTO: 4.9 10*6/MM3 (ref 4.14–5.8)
RBC MORPH BLD: ABNORMAL
SODIUM SERPL-SCNC: 136 MMOL/L (ref 136–145)
TRANSFERRIN SERPL-MCNC: 332 MG/DL (ref 200–370)
TRIGL SERPL-MCNC: 220 MG/DL (ref 0–150)
VLDLC SERPL CALC-MCNC: 44 MG/DL
WBC # BLD AUTO: 9.32 10*3/MM3 (ref 3.4–10.8)

## 2019-08-02 RX ORDER — DOXYCYCLINE HYCLATE 50 MG/1
324 CAPSULE, GELATIN COATED ORAL
Qty: 90 TABLET | Refills: 5 | Status: SHIPPED | OUTPATIENT
Start: 2019-08-02 | End: 2019-11-05

## 2019-08-06 ENCOUNTER — TELEPHONE (OUTPATIENT)
Dept: FAMILY MEDICINE CLINIC | Facility: CLINIC | Age: 69
End: 2019-08-06

## 2019-08-06 NOTE — TELEPHONE ENCOUNTER
Pt is aware of earlier appt.     ----- Message from Edy Kline MD sent at 8/1/2019 10:28 AM EDT -----  Please arrange an earlier appt with dr thompson  Patient had a stroke and stenting of the right ICA is being considered

## 2019-08-14 DIAGNOSIS — M54.9 MECHANICAL BACK PAIN: ICD-10-CM

## 2019-08-14 RX ORDER — OXYCODONE HYDROCHLORIDE 10 MG/1
10 TABLET ORAL 4 TIMES DAILY
Qty: 120 TABLET | Refills: 0 | Status: SHIPPED | OUTPATIENT
Start: 2019-08-14 | End: 2019-09-13 | Stop reason: SDUPTHER

## 2019-08-19 DIAGNOSIS — M54.9 MECHANICAL BACK PAIN: ICD-10-CM

## 2019-08-19 RX ORDER — OXYCODONE HYDROCHLORIDE 10 MG/1
10 TABLET ORAL 4 TIMES DAILY
Qty: 120 TABLET | Refills: 0 | OUTPATIENT
Start: 2019-08-19

## 2019-08-22 ENCOUNTER — OFFICE VISIT (OUTPATIENT)
Dept: CARDIOLOGY | Facility: CLINIC | Age: 69
End: 2019-08-22

## 2019-08-22 DIAGNOSIS — E78.2 MIXED HYPERLIPIDEMIA: ICD-10-CM

## 2019-08-22 DIAGNOSIS — I50.22 CHRONIC SYSTOLIC CONGESTIVE HEART FAILURE (HCC): Primary | ICD-10-CM

## 2019-08-22 DIAGNOSIS — I10 ESSENTIAL HYPERTENSION: ICD-10-CM

## 2019-08-22 DIAGNOSIS — F17.200 SMOKER: ICD-10-CM

## 2019-08-22 PROBLEM — I63.9 STROKE (HCC): Status: ACTIVE | Noted: 2019-06-06

## 2019-08-22 PROCEDURE — 99213 OFFICE O/P EST LOW 20 MIN: CPT | Performed by: INTERNAL MEDICINE
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
## 2019-09-13 ENCOUNTER — OFFICE VISIT (OUTPATIENT)
Dept: FAMILY MEDICINE CLINIC | Facility: CLINIC | Age: 69
End: 2019-09-13

## 2019-09-13 DIAGNOSIS — E55.9 VITAMIN D DEFICIENCY: ICD-10-CM

## 2019-09-13 DIAGNOSIS — M54.9 MECHANICAL BACK PAIN: ICD-10-CM

## 2019-09-13 DIAGNOSIS — I63.231 CEREBRAL INFARCTION DUE TO OCCLUSION OF RIGHT INTERNAL CAROTID ARTERY (HCC): ICD-10-CM

## 2019-09-13 DIAGNOSIS — Z23 ENCOUNTER FOR IMMUNIZATION: ICD-10-CM

## 2019-09-13 DIAGNOSIS — Z00.00 HEALTHCARE MAINTENANCE: ICD-10-CM

## 2019-09-13 DIAGNOSIS — K21.9 GASTROESOPHAGEAL REFLUX DISEASE WITHOUT ESOPHAGITIS: ICD-10-CM

## 2019-09-13 DIAGNOSIS — J30.2 CHRONIC SEASONAL ALLERGIC RHINITIS: ICD-10-CM

## 2019-09-13 DIAGNOSIS — N52.01 ERECTILE DYSFUNCTION DUE TO ARTERIAL INSUFFICIENCY: ICD-10-CM

## 2019-09-13 DIAGNOSIS — E11.42 TYPE 2 DIABETES MELLITUS WITH PERIPHERAL NEUROPATHY (HCC): ICD-10-CM

## 2019-09-13 DIAGNOSIS — I25.10 CORONARY ARTERY DISEASE INVOLVING NATIVE CORONARY ARTERY OF NATIVE HEART WITHOUT ANGINA PECTORIS: ICD-10-CM

## 2019-09-13 DIAGNOSIS — K57.30 DIVERTICULOSIS OF LARGE INTESTINE WITHOUT HEMORRHAGE: ICD-10-CM

## 2019-09-13 DIAGNOSIS — F17.200 SMOKER: ICD-10-CM

## 2019-09-13 DIAGNOSIS — J44.9 COPD MIXED TYPE (HCC): ICD-10-CM

## 2019-09-13 DIAGNOSIS — D50.0 IRON DEFICIENCY ANEMIA DUE TO CHRONIC BLOOD LOSS: ICD-10-CM

## 2019-09-13 DIAGNOSIS — E78.2 MIXED HYPERLIPIDEMIA: Primary | ICD-10-CM

## 2019-09-13 DIAGNOSIS — I10 ESSENTIAL HYPERTENSION: ICD-10-CM

## 2019-09-13 DIAGNOSIS — N18.30 STAGE 3 CHRONIC KIDNEY DISEASE (HCC): ICD-10-CM

## 2019-09-13 DIAGNOSIS — I50.22 CHRONIC SYSTOLIC CONGESTIVE HEART FAILURE (HCC): ICD-10-CM

## 2019-09-13 PROCEDURE — G0008 ADMIN INFLUENZA VIRUS VAC: HCPCS | Performed by: GENERAL PRACTICE

## 2019-09-13 PROCEDURE — 99214 OFFICE O/P EST MOD 30 MIN: CPT | Performed by: GENERAL PRACTICE

## 2019-09-13 PROCEDURE — 90674 CCIIV4 VAC NO PRSV 0.5 ML IM: CPT | Performed by: GENERAL PRACTICE

## 2019-09-13 RX ORDER — OXYCODONE HYDROCHLORIDE 10 MG/1
10 TABLET ORAL 4 TIMES DAILY
Qty: 120 TABLET | Refills: 0 | Status: SHIPPED | OUTPATIENT
Start: 2019-09-13 | End: 2019-10-08 | Stop reason: SDUPTHER

## 2019-09-13 NOTE — PROGRESS NOTES
Katie Zuñiga is a 68 y.o. male.     History of Present Illness     Right Cerebral Infarcts  Hospitalized several months ago at [facility] with acute watershed infarcts of the right hemisphere contributed to an 80% stenosis of the right ICA.  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  Echocardiogram was reported as showing borderline left ventricular dilation with inferior wall akinesis, moderate septal hypokinesis, an estimated ejection fraction of 40 to 50%, and right ventricular dilation.  No mural thrombus was noted.  He was ultimately discharged on his preadmission medications with the addition of clopidogrel and a change in xultophy to insulin detemir 45 daily and insulin aspartame according to a sliding scale with meals.  He attended rehabilitation at [facility] afterward. He admits to persistent left-sided weakness.  He was scheduled to undergo a neurosurgical reassessment regarding possible stenting of the right ICA but was unable to follow-up with this and is not interested in doing so at present.  He quit smoking since last here!    Congestive Heart Failure  Patient has a history of congestive heart failure previously complicated by an apical mural thrombus treated with coumadin. Patient remains short of breath with exertion,with occasional palpitations, lightheadedness, and fatigue. He denies dyspnea at rest, orthopnea, paroxysmal nocturnal dyspnea, or chest pain. Current medications include entresto, carvedilol,  furosemide.  He states he is compliant most of the time with his medications. He states he is noncompliant most of the time with his diet.  He continues to be followed by cardiology and underwent a reassessment with Dr. Marcus on 8/22/19    Coronary artery disease  He has a history of CABG and CHF. Previous diagnostic testing includes: cardiac catheterization Recent history: taking medications as instructed, no medication side effects noted, no  TIA's, no chest pain on exertion, notes increased dyspnea on exertion, no change and no swelling of ankles. Medication side effects include: none.     Diabetes  Current symptoms include none. Patient denies paresthesia of the feet, visual disturbances, polydipsia, polyuria, hypoglycemia and foot ulcerations. Evaluation to date has been: hemoglobin A1C. Home sugars: BGs are running  consistent with Hgb A1C. Current treatments: insulin detemir 45 daily, insulin aspartame 3 times daily with meals according to sliding scale     Dyslipidemia  Compliance with treatment has been poor. The patient exercises rarely. He is currently being prescribed the following medication for his dyslipidemia - rosuvastatin.  Lab Results   Component Value Date    CHOL 305 (H) 04/11/2017    CHLPL 105 08/01/2019    TRIG 220 (H) 08/01/2019    HDL 34 (L) 08/01/2019    LDL 27 08/01/2019     Chronic Renal Failure  Patient returns with a history of chronic renal disease caused by diabetic nephropathy and hypertensive nephrosclerosis. Treatments have been prescribed for slowing the progression of CRF include avoid NSAIDs.The patient has experienced  constipation, dyspnea, palpitations, fatigue and weakness.The patient has not experienced any nausea, anorexia, pruritis, PND, orthopnea, edema, chest pain, cognitive impairment, dysuria and hematuria. Patient is not followed by nephrology.  Lab Results   Component Value Date    CREATININE 1.41 (H) 08/01/2019     Lab Results   Component Value Date    K 4.3 08/01/2019     COPD  He admits to persistent cough associated with shortness of breath on exertion and intermittent wheezing. He states that these symptoms occur at any time during the day or night. He reports that his symptoms become worse with activity. His symptoms are reduced with rest and with inhaled inhaled medication. The patient states he also has nasal congestion. He is following the treatment plan, including medications as directed. Updated  CT of the chest performed on 2/26/18 was reported as showing emphysematous changes as well as a 4 mm CHENCHO nodule.  Chest x-ray performed on 6/6/2019 was reported as showing cardiomegaly, small bilateral pleural effusions, bibasilar atelectasis, and prominent interstitial markings.    Labs  Lab Results   Component Value Date    WBC 9.32 08/01/2019    HGB 9.3 (L) 08/01/2019    HCT 35.9 (L) 08/01/2019    MCV 73.3 (L) 08/01/2019     08/01/2019     Lab Results   Component Value Date    IRON 43 (L) 04/11/2017    TIBC 544 04/09/2019    FERRITIN 8.53 (L) 08/01/2019     The following portions of the patient's history were reviewed and updated as appropriate: allergies, current medications, past medical history, past social history and problem list.    Review of Systems   Constitutional: Positive for fatigue. Negative for appetite change, chills, fever and unexpected weight change.   HENT: Positive for rhinorrhea and sneezing. Negative for congestion, ear pain, postnasal drip, sinus pressure, sore throat and voice change.    Eyes: Negative for visual disturbance.   Respiratory: Positive for cough, shortness of breath and wheezing.    Cardiovascular: Positive for palpitations. Negative for chest pain and leg swelling.   Gastrointestinal: Positive for constipation. Negative for abdominal pain, blood in stool, diarrhea, nausea and vomiting.   Endocrine: Negative for polydipsia and polyuria.   Genitourinary: Negative for difficulty urinating, dysuria, frequency, hematuria and urgency.   Musculoskeletal: Positive for arthralgias and back pain. Negative for joint swelling, myalgias and neck pain.   Skin: Negative for color change.   Neurological: Positive for dizziness, weakness (generalized) and headaches. Negative for tremors and numbness.   Psychiatric/Behavioral: Negative for dysphoric mood, sleep disturbance and suicidal ideas. The patient is not nervous/anxious.      Objective   Physical Exam   Constitutional: He is  oriented to person, place, and time. He appears well-developed and well-nourished. He is cooperative. He does not have a sickly appearance. No distress.   Accompanied by his wife. Bright and in fair spirits. Appeared older than stated age, chronically ill, and pale. Ambulating with a walker.  Able to climb onto the exam table today.  No apparent distress.  No cyanosis, diaphoresis, or jaundice.   HENT:   Head: Atraumatic.   Right Ear: Tympanic membrane, external ear and ear canal normal.   Left Ear: Tympanic membrane, external ear and ear canal normal.   Mouth/Throat: Oropharynx is clear and moist.   Eyes: EOM are normal. Pupils are equal, round, and reactive to light. No scleral icterus.   Neck: No JVD present. Carotid bruit is not present. No tracheal deviation present. No thyromegaly present.   Cardiovascular: Normal rate, regular rhythm, S1 normal, S2 normal, normal heart sounds and intact distal pulses. Exam reveals no gallop.   No murmur heard.  Pulmonary/Chest: No accessory muscle usage. No respiratory distress. He has decreased breath sounds in the right lower field and the left lower field. He has wheezes (diffuse-primarily with forced expiration). He has no rales.   Mild pulmonary hyperinflation   Abdominal: Soft. Normal aorta and bowel sounds are normal. He exhibits no abdominal bruit and no mass. There is no hepatosplenomegaly. There is no tenderness. No hernia.   Musculoskeletal: He exhibits no deformity.   No peripheral joint redness or warmth   Lymphadenopathy:        Head (right side): No submandibular adenopathy present.        Head (left side): No submandibular adenopathy present.     He has no cervical adenopathy.   Neurological: He is alert and oriented to person, place, and time. He displays no tremor. A sensory deficit (decreased vibration sense both feet) is present. No cranial nerve deficit. He exhibits abnormal muscle tone (mild - left upper extremity). Coordination abnormal.   Mild weakness  left upper extremity   Skin: Skin is warm and dry. No rash noted. He is not diaphoretic. No pallor. Nails show no clubbing.   Psychiatric: He has a normal mood and affect. His behavior is normal.     Assessment/Plan   Problems Addressed this Visit        Cardiovascular and Mediastinum    Essential hypertension  Hypertension: blood pressure acceptable. Evidence of target organ damage: coronary artery disease, heart failure, chronic kidney disease, stroke and right ICA stenosis.  Encouraged to continue to work on diet and exercise plan.   Continue current medication    Mixed hyperlipidemia   As above.   Continue current medication.    CAD (coronary artery disease)  Reminded regarding the importance of risk factor modification.  Continue current medication  Follow up with cardiology     Chronic systolic congestive heart failure (CMS/HCC)  As above.    Cerebral infarction due to occlusion of right internal carotid artery (CMS/HCC)  As above.  Encouraged to let us know if he should wish referral for possible stenting       Respiratory    COPD mixed type (CMS/HCC)   COPD is stable.  Encouraged to remain off cigarettes  Encouraged to remain as active as symptoms allow for    Chronic seasonal allergic rhinitis       Digestive    Vitamin D deficiency    Gastroesophageal reflux disease without esophagitis    Diverticulosis of large intestine without hemorrhage       Endocrine    Type 2 diabetes mellitus with peripheral neuropathy (CMS/HCC)  Diabetes mellitus Type II, under unknown control.   Encouraged to continue to pursue ADA diet  Encouraged aerobic exercise.  Continue current medication  Updated labs will be drawn at his return.       Nervous and Auditory    Mechanical back pain  Reminded regarding symptomatic treatment.   Continue current medication  We will arrange a pain management assessment    Relevant Medications    oxyCODONE (ROXICODONE) 10 MG tablet    Other Relevant Orders    Ambulatory Referral to Pain  Management       Genitourinary    Stage 3 chronic kidney disease (CMS/HCC)       Hematopoietic and Hemostatic    Iron deficiency anemia due to chronic blood loss  Will continue to monitor       Other    Vasculogenic erectile dysfunction    Former Smoker  Encouraged to remain off tobacco    Healthcare maintenance  Recommended a flu shot  Encouraged to follow up with shingrix.  Will arrange an updated low-dose CT of the chest  Will discuss an updated Pneumovax 23 at his return    Relevant Medications    Zoster Vac Recomb Adjuvanted (SHINGRIX) 50 MCG/0.5ML reconstituted suspension    Other Relevant Orders    Flucelvax Quad=>4Years (PFS) (Completed)      Other Visit Diagnoses     Encounter for immunization        Relevant Orders    Flucelvax Quad=>4Years (PFS) (Completed)

## 2019-09-14 VITALS
SYSTOLIC BLOOD PRESSURE: 110 MMHG | WEIGHT: 163 LBS | TEMPERATURE: 98.2 F | HEART RATE: 79 BPM | RESPIRATION RATE: 12 BRPM | DIASTOLIC BLOOD PRESSURE: 60 MMHG | OXYGEN SATURATION: 92 % | HEIGHT: 68 IN | BODY MASS INDEX: 24.71 KG/M2

## 2019-09-14 PROBLEM — I63.9 STROKE (HCC): Status: RESOLVED | Noted: 2019-06-06 | Resolved: 2019-09-14

## 2019-10-08 ENCOUNTER — OFFICE VISIT (OUTPATIENT)
Dept: FAMILY MEDICINE CLINIC | Facility: CLINIC | Age: 69
End: 2019-10-08

## 2019-10-08 DIAGNOSIS — M25.50 ARTHRALGIA, UNSPECIFIED JOINT: ICD-10-CM

## 2019-10-08 DIAGNOSIS — Z79.4 TYPE 2 DIABETES MELLITUS WITH HYPERGLYCEMIA, WITH LONG-TERM CURRENT USE OF INSULIN (HCC): Primary | ICD-10-CM

## 2019-10-08 DIAGNOSIS — E11.65 TYPE 2 DIABETES MELLITUS WITH HYPERGLYCEMIA, WITH LONG-TERM CURRENT USE OF INSULIN (HCC): Primary | ICD-10-CM

## 2019-10-08 DIAGNOSIS — I25.10 CORONARY ARTERY DISEASE INVOLVING NATIVE CORONARY ARTERY OF NATIVE HEART WITHOUT ANGINA PECTORIS: ICD-10-CM

## 2019-10-08 DIAGNOSIS — I10 ESSENTIAL HYPERTENSION: ICD-10-CM

## 2019-10-08 DIAGNOSIS — M54.9 MECHANICAL BACK PAIN: ICD-10-CM

## 2019-10-08 PROCEDURE — 99214 OFFICE O/P EST MOD 30 MIN: CPT | Performed by: PHYSICIAN ASSISTANT

## 2019-10-08 RX ORDER — OXYCODONE HYDROCHLORIDE 10 MG/1
10 TABLET ORAL 4 TIMES DAILY
Qty: 120 TABLET | Refills: 0 | Status: SHIPPED | OUTPATIENT
Start: 2019-10-08 | End: 2019-11-05

## 2019-10-10 ENCOUNTER — RESULTS ENCOUNTER (OUTPATIENT)
Dept: FAMILY MEDICINE CLINIC | Facility: CLINIC | Age: 69
End: 2019-10-10

## 2019-10-10 VITALS
SYSTOLIC BLOOD PRESSURE: 110 MMHG | OXYGEN SATURATION: 97 % | DIASTOLIC BLOOD PRESSURE: 50 MMHG | TEMPERATURE: 97.6 F | HEIGHT: 68 IN | WEIGHT: 169 LBS | BODY MASS INDEX: 25.61 KG/M2 | HEART RATE: 74 BPM

## 2019-10-10 DIAGNOSIS — M54.9 MECHANICAL BACK PAIN: ICD-10-CM

## 2019-10-10 DIAGNOSIS — M25.50 ARTHRALGIA, UNSPECIFIED JOINT: ICD-10-CM

## 2019-10-10 NOTE — PROGRESS NOTES
"Katie Zuñiga is a 68 y.o. male.     Chief complaint- hyperglycemia    History of Present Illness       Diabetes mellitus-  He complains of hyperglycemia due to diabetes mellitus.  He reports a fasting and 2-hour postprandial blood glucose 300-4 60.  He is currently on NovoLog sliding scale before meals and takes Levemir 50 units nightly.  He does have associated peripheral neuropathy.  Uncontrolled  Lab Results   Component Value Date    HGBA1C 12.41 (H) 06/10/2019       Chronic back pain -  He complains of chronic lumbar back pain.  Pain is not related to a specific injury.  Symptoms include back pain,stiffness, and decreased ROJM.  He denies urinary incontinence or fecal incontinence.  Pain is located int he low back.  Radiation to left leg. Pain is described as varying from mild to severe burning and throbbing. Onset x years.  Symptoms exacerbated by standing and walking.  Symptoms alleviated by rest and opioid analgesics.  Associated symptoms include sexual dysfunction.  Pain causes functional limitation including general activity, mood, walking ability, work, and activities of daily living.    A referral has been made to pain management clinic in [facility].  He has a pending appointment for 10/28/2019.    9/4/09 MRI lumbar spine = disc bulging L4/5 and L5/S1 with central protrusion of disc and facet arthropathy involving L3-S1    CAD - stable with aspirin and Nitrostat when necessary    Hypertension - stable with entresto and lasix    Pain Score    10/08/19 1423   PainSc:   6   PainLoc: Back       /50   Pulse 74   Temp 97.6 °F (36.4 °C) (Oral)   Ht 172.7 cm (67.99\")   Wt 76.7 kg (169 lb)   SpO2 97%   BMI 25.70 kg/m²     The following portions of the patient's history were reviewed and updated as appropriate: allergies, current medications, past family history, past medical history, past social history, past surgical history and problem list.    Review of Systems   Constitutional: " "Positive for fatigue. Negative for activity change, appetite change and fever.   HENT: Negative for ear pain, sinus pressure and sore throat.    Eyes: Negative for pain and visual disturbance.   Respiratory: Negative for cough and chest tightness.    Cardiovascular: Negative for chest pain and palpitations.   Gastrointestinal: Negative for abdominal pain, constipation, diarrhea, nausea and vomiting.   Endocrine: Negative for polydipsia and polyuria.   Genitourinary: Negative for dysuria and frequency.   Musculoskeletal: Positive for back pain. Negative for myalgias.   Skin: Negative for color change and rash.   Allergic/Immunologic: Negative for food allergies and immunocompromised state.   Neurological: Negative for dizziness, syncope and headaches.   Hematological: Negative for adenopathy. Does not bruise/bleed easily.   Psychiatric/Behavioral: Negative for hallucinations and suicidal ideas. The patient is not nervous/anxious.        /50   Pulse 74   Temp 97.6 °F (36.4 °C) (Oral)   Ht 172.7 cm (67.99\")   Wt 76.7 kg (169 lb)   SpO2 97%   BMI 25.70 kg/m²     Objective   Physical Exam   Constitutional: He is oriented to person, place, and time. He appears well-developed and well-nourished.   HENT:   Head: Normocephalic and atraumatic.   Nose: Nose normal.   Mouth/Throat: Oropharynx is clear and moist.   Eyes: Conjunctivae and EOM are normal. Pupils are equal, round, and reactive to light.   Neck: Normal range of motion. Neck supple. No tracheal deviation present. No thyromegaly present.   Cardiovascular: Normal rate, regular rhythm, normal heart sounds and intact distal pulses.   No murmur heard.  Pulmonary/Chest: Effort normal and breath sounds normal. No respiratory distress. He has no wheezes.   Abdominal: Soft. Bowel sounds are normal. There is no tenderness. There is no guarding.   Musculoskeletal: Normal range of motion. He exhibits tenderness (lumbar musculature diffusely tender). He exhibits no " edema.   Lymphadenopathy:     He has no cervical adenopathy.   Neurological: He is alert and oriented to person, place, and time.   Skin: Skin is warm and dry. No rash noted.   Psychiatric: He has a normal mood and affect. His behavior is normal.   Nursing note and vitals reviewed.      Assessment/Plan   Diagnoses and all orders for this visit:    Type 2 diabetes mellitus with hyperglycemia, with long-term current use of insulin (CMS/[facility])  Comments:  Increase Levemir 55 units nightly  Continue sliding scale NovoLog before meals  Advised fasting and 2-hour postprandial blood glucose log and bring to next visi    Mechanical back pain  Comments:  [facility] #[id number] reviewed and is consistent  Prescription written oxycodone 10 mg #120 with no refills by Dr. Kline  Orders:  -     Urine Drug Screen - Urine, Clean Catch; Future    Coronary artery disease involving native coronary artery of native heart without angina pectoris  Comments:  Continue Nitrostat and aspirin as well as risk factor modification    Essential hypertension  Comments:  Continue Entresto, Coreg, and Lasix    Arthralgia, unspecified joint   -     Urine Drug Screen - Urine, Clean Catch; Future      UDS 7/9/19 reviewed and is consistent.  Patient advised that I will no longer be prescribing chronic schedule II opioids after the end of this year.  Reviewed options including tapering off opioid therapy, changing to a schedule III opioid, or referral to pain management.  Patient will consider      As part of the patient's treatment plan they are being prescribed a controlled substance/ substances with potential for abuse.  This patient has been made aware of the appropriate use of such medications, including potential risk of somnolence, limited ability to drive and/or work safely, and potential for overdose.  It has also been made clear these medications are for use by the patient only, without concomitant use of alcohol or other substances unless  prescribed/advised by medical provider.    Patient has completed prescribing agreement detailing terms of continued prescribing of controlled substances including monitoring DANAE reports, urine drug screens, and pill counts.  The patient is aware DANAE reports are reviewed on a regular basis and scanned into the chart.    History and physical exam exhibit continued safe and appropriate use of controlled substances.

## 2019-10-11 RX ORDER — ESOMEPRAZOLE MAGNESIUM 40 MG/1
40 CAPSULE, DELAYED RELEASE ORAL DAILY
Qty: 30 CAPSULE | Refills: 5 | Status: SHIPPED | OUTPATIENT
Start: 2019-10-11 | End: 2020-01-01

## 2019-10-18 RX ORDER — FUROSEMIDE 40 MG/1
40 TABLET ORAL EVERY MORNING
Qty: 30 TABLET | Refills: 5 | Status: SHIPPED | OUTPATIENT
Start: 2019-10-18 | End: 2020-01-01

## 2019-10-18 RX ORDER — ROSUVASTATIN CALCIUM 20 MG/1
20 TABLET, COATED ORAL DAILY
Qty: 30 TABLET | Refills: 11 | Status: SHIPPED | OUTPATIENT
Start: 2019-10-18 | End: 2019-10-23 | Stop reason: SDUPTHER

## 2019-10-23 RX ORDER — ROSUVASTATIN CALCIUM 20 MG/1
TABLET, COATED ORAL
Qty: 30 TABLET | Refills: 5 | Status: SHIPPED | OUTPATIENT
Start: 2019-10-23 | End: 2020-01-01

## 2019-11-05 ENCOUNTER — OFFICE VISIT (OUTPATIENT)
Dept: FAMILY MEDICINE CLINIC | Facility: CLINIC | Age: 69
End: 2019-11-05

## 2019-11-05 VITALS
TEMPERATURE: 98.5 F | HEART RATE: 100 BPM | SYSTOLIC BLOOD PRESSURE: 116 MMHG | DIASTOLIC BLOOD PRESSURE: 60 MMHG | HEIGHT: 68 IN | BODY MASS INDEX: 25.46 KG/M2 | WEIGHT: 168 LBS | OXYGEN SATURATION: 96 %

## 2019-11-05 DIAGNOSIS — Z79.4 TYPE 2 DIABETES MELLITUS WITH HYPERGLYCEMIA, WITH LONG-TERM CURRENT USE OF INSULIN (HCC): Primary | ICD-10-CM

## 2019-11-05 DIAGNOSIS — I25.10 CORONARY ARTERY DISEASE INVOLVING NATIVE CORONARY ARTERY OF NATIVE HEART WITHOUT ANGINA PECTORIS: ICD-10-CM

## 2019-11-05 DIAGNOSIS — B07.9 VIRAL WARTS, UNSPECIFIED TYPE: ICD-10-CM

## 2019-11-05 DIAGNOSIS — E11.65 TYPE 2 DIABETES MELLITUS WITH HYPERGLYCEMIA, WITH LONG-TERM CURRENT USE OF INSULIN (HCC): Primary | ICD-10-CM

## 2019-11-05 DIAGNOSIS — I10 ESSENTIAL HYPERTENSION: ICD-10-CM

## 2019-11-05 DIAGNOSIS — I73.9 PAD (PERIPHERAL ARTERY DISEASE) (HCC): ICD-10-CM

## 2019-11-05 PROCEDURE — 99214 OFFICE O/P EST MOD 30 MIN: CPT | Performed by: PHYSICIAN ASSISTANT

## 2019-11-05 NOTE — PROGRESS NOTES
"Katie Zuñiga is a 68 y.o. male.     Chief complaint- hyperglycemia    History of Present Illness       Diabetes mellitus-  He complains of hyperglycemia due to diabetes mellitus.  He reports a fasting and 2-hour postprandial blood glucose 300-400.  He is currently on NovoLog 28 units before meals tid and takes Levemir 50 units nightly.  He does have associated peripheral neuropathy.  Uncontrolled  Lab Results   Component Value Date    HGBA1C 12.41 (H) 06/10/2019     Wart-  He complains of a wart on his right hand.  He states that it was removed over 10 years ago by Dr. James but it has since recurred and is now growing larger.    CAD - stable with aspirin and Nitrostat when necessary    Hypertension - stable with entresto and lasix    Pain Score    11/05/19 1335   PainSc:   7   PainLoc: Back       /60   Pulse 100   Temp 98.5 °F (36.9 °C) (Oral)   Ht 172.7 cm (67.99\")   Wt 76.2 kg (168 lb)   SpO2 96%   BMI 25.55 kg/m²     The following portions of the patient's history were reviewed and updated as appropriate: allergies, current medications, past family history, past medical history, past social history, past surgical history and problem list.    Review of Systems   Constitutional: Positive for fatigue (chronic). Negative for activity change, appetite change and fever.   HENT: Negative for ear pain, sinus pressure and sore throat.    Eyes: Negative for pain and visual disturbance.   Respiratory: Negative for cough and chest tightness.    Cardiovascular: Negative for chest pain and palpitations.   Gastrointestinal: Negative for abdominal pain, constipation, diarrhea, nausea and vomiting.   Endocrine: Negative for polydipsia and polyuria.   Genitourinary: Negative for dysuria and frequency.   Musculoskeletal: Positive for back pain (chronic). Negative for myalgias.   Skin: Negative for color change and rash.        Approximately 1/2 x 1 cm wart on right hand/base of thumb "   Allergic/Immunologic: Negative for food allergies and immunocompromised state.   Neurological: Negative for dizziness, syncope and headaches.   Hematological: Negative for adenopathy. Does not bruise/bleed easily.   Psychiatric/Behavioral: Negative for hallucinations and suicidal ideas. The patient is not nervous/anxious.        Objective   Physical Exam   Constitutional: He is oriented to person, place, and time. He appears well-developed and well-nourished.   HENT:   Head: Normocephalic and atraumatic.   Nose: Nose normal.   Mouth/Throat: Oropharynx is clear and moist.   Eyes: Conjunctivae and EOM are normal. Pupils are equal, round, and reactive to light.   Neck: Normal range of motion. Neck supple. No tracheal deviation present. No thyromegaly present.   Cardiovascular: Normal rate, regular rhythm, normal heart sounds and intact distal pulses.   No murmur heard.  Pulmonary/Chest: Effort normal and breath sounds normal. No respiratory distress. He has no wheezes.   Abdominal: Soft. Bowel sounds are normal. There is no tenderness. There is no guarding.   Musculoskeletal: Normal range of motion. He exhibits tenderness (lumbar musculature diffusely tender). He exhibits no edema.   Lymphadenopathy:     He has no cervical adenopathy.   Neurological: He is alert and oriented to person, place, and time.   Skin: Skin is warm and dry. No rash noted.   Psychiatric: He has a normal mood and affect. His behavior is normal.   Nursing note and vitals reviewed.      Assessment/Plan   Diagnoses and all orders for this visit:    Type 2 diabetes mellitus with hyperglycemia, with long-term current use of insulin (CMS/[facility])  Comments:  Increase Levemir 60 units nightly  Continue NovoLog sliding scale (Max 28 units qac)    PAD (peripheral artery disease) (CMS/[facility])  Comments:  Continue Plavix    Viral warts, unspecified type  -     Ambulatory Referral to General Surgery    Coronary artery disease involving native coronary artery of  native heart without angina pectoris  Comments:  Continue risk factor modification including aspirin and Entresto    Essential hypertension  Comments:  Continue Lasix and Entresto      He is now going to pain management in [place] for chronic pain issues.

## 2019-11-11 DIAGNOSIS — E11.42 TYPE 2 DIABETES MELLITUS WITH PERIPHERAL NEUROPATHY (HCC): ICD-10-CM

## 2019-11-14 ENCOUNTER — APPOINTMENT (OUTPATIENT)
Dept: CT IMAGING | Facility: HOSPITAL | Age: 69
End: 2019-11-14

## 2019-11-26 DIAGNOSIS — F41.9 CHRONIC ANXIETY: ICD-10-CM

## 2019-11-26 DIAGNOSIS — E11.42 TYPE 2 DIABETES MELLITUS WITH PERIPHERAL NEUROPATHY (HCC): ICD-10-CM

## 2019-11-26 RX ORDER — DULOXETIN HYDROCHLORIDE 60 MG/1
60 CAPSULE, DELAYED RELEASE ORAL DAILY
Qty: 30 CAPSULE | Refills: 5 | Status: SHIPPED | OUTPATIENT
Start: 2019-11-26 | End: 2019-11-29 | Stop reason: SDUPTHER

## 2019-11-29 DIAGNOSIS — F41.9 CHRONIC ANXIETY: ICD-10-CM

## 2019-11-29 DIAGNOSIS — E11.42 TYPE 2 DIABETES MELLITUS WITH PERIPHERAL NEUROPATHY (HCC): ICD-10-CM

## 2019-12-02 RX ORDER — DULOXETIN HYDROCHLORIDE 60 MG/1
CAPSULE, DELAYED RELEASE ORAL
Qty: 30 CAPSULE | Refills: 5 | Status: SHIPPED | OUTPATIENT
Start: 2019-12-02 | End: 2020-01-01 | Stop reason: SDUPTHER

## 2019-12-12 NOTE — PROGRESS NOTES
Katie Zuñiga is a 68 y.o. male.     History of Present Illness     Right Cerebral Infarcts  Hospitalized at [facility] six months ago with acute watershed infarcts of the right hemisphere contributed to an 80% stenosis of the right ICA.  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  Echocardiogram was reported as showing borderline left ventricular dilation with inferior wall akinesis, moderate septal hypokinesis, an estimated ejection fraction of 40 to 50%, and right ventricular dilation.  No mural thrombus was noted.  He was ultimately discharged on his preadmission medications with the addition of clopidogrel and a change in xultophy to insulin detemir and insulin aspartame according to a sliding scale with meals.  He attended rehabilitation at [facility] afterward. He admits to persistent left-sided weakness.  He has decided against a neurosurgical reassessment.  He is uninterested in stenting of the right ICA.      Congestive Heart Failure  Patient has a history of congestive heart failure previously complicated by an apical mural thrombus treated with coumadin. Patient remains short of breath with exertion,with occasional palpitations, lightheadedness, and fatigue. He denies dyspnea at rest, orthopnea, paroxysmal nocturnal dyspnea, or chest pain. Current medications include entresto, carvedilol,  furosemide.  He states he is compliant most of the time with his medications. He states he is noncompliant most of the time with his diet.  He was unable to follow-up with cardiology since last year    Coronary artery disease  He has a history of CABG and CHF. Previous diagnostic testing includes: cardiac catheterization Recent history: taking medications as instructed, no medication side effects noted, no TIA's, no chest pain on exertion, notes increased dyspnea on exertion, no change and no swelling of ankles. Medication side effects include: none.     Diabetes  Current  symptoms include none. Patient denies paresthesia of the feet, visual disturbances, polydipsia, polyuria, hypoglycemia and foot ulcerations. Evaluation to date has been: hemoglobin A1C. Home sugars: BGs are running  consistent with Hgb A1C. Current treatments: insulin detemir 45 daily, insulin aspartame 3 times daily with meals according to sliding scale.  He underwent an updated diabetic eye exam since last here     Dyslipidemia  Compliance with treatment has been poor. The patient exercises rarely. He is currently being prescribed the following medication for his dyslipidemia - rosuvastatin.    Chronic Renal Failure  Patient returns with a history of chronic renal disease caused by diabetic nephropathy and hypertensive nephrosclerosis. Treatments have been prescribed for slowing the progression of CRF include avoid NSAIDs.The patient has experienced  constipation, dyspnea, palpitations, fatigue and weakness.The patient has not experienced any nausea, anorexia, pruritis, PND, orthopnea, edema, chest pain, cognitive impairment, dysuria and hematuria. Patient is not followed by nephrology.    COPD  He admits to persistent cough associated with shortness of breath on exertion and intermittent wheezing. He states that these symptoms occur at any time during the day or night. He reports that his symptoms become worse with activity. His symptoms are reduced with rest and with inhaled inhaled medication. The patient states he also has nasal congestion. He is following the treatment plan, including medications as directed. Updated CT of the chest performed on 2/26/18 was reported as showing emphysematous changes as well as a 4 mm CHENCHO nodule.  Chest x-ray performed on 6/6/2019 was reported as showing cardiomegaly, small bilateral pleural effusions, bibasilar atelectasis, and prominent interstitial markings.    Chronic Low Back Pain  He complains of chronic lumbar back pain.  Pain is not related to a specific injury.   Symptoms include back pain,stiffness, and decreased ROJM.  He denies urinary incontinence or fecal incontinence.  Pain is located int he low back.  Radiation to left leg. Pain is described as varying from mild to severe burning and throbbing. Onset x years.  Symptoms exacerbated by standing and walking.  Symptoms alleviated by rest and opioid analgesics.  Associated symptoms include sexual dysfunction.  Pain causes functional limitation including general activity, mood, walking ability, work, and activities of daily living. Previous MRI lumbar spine was reported as showing disc bulging L4/5 and L5/S1 with central protrusion of disc and facet arthropathy involving L3-S1.  He is currently followed by pain management.  He was started on gabapentin at his last visit but discontinued this several days ago due to increased gait instability with a prompt improvement    The following portions of the patient's history were reviewed and updated as appropriate: allergies, current medications, past medical history, past social history and problem list.    Review of Systems   Constitutional: Positive for appetite change (increased) and fatigue. Negative for chills and fever.   HENT: Positive for rhinorrhea and sneezing. Negative for congestion, ear pain, postnasal drip, sinus pressure, sore throat and voice change.    Respiratory: Positive for cough, shortness of breath and wheezing.    Cardiovascular: Positive for palpitations. Negative for chest pain and leg swelling.   Gastrointestinal: Positive for constipation. Negative for abdominal pain, blood in stool, diarrhea, nausea and vomiting.   Genitourinary: Negative for difficulty urinating, dysuria, frequency, hematuria and urgency.   Musculoskeletal: Positive for arthralgias and back pain. Negative for joint swelling, myalgias and neck pain.   Skin: Negative for color change.   Neurological: Positive for dizziness, weakness (generalized) and headaches. Negative for tremors and  numbness.   Psychiatric/Behavioral: Positive for sleep disturbance. Negative for dysphoric mood. The patient is not nervous/anxious.      Objective   Physical Exam   Constitutional: He is oriented to person, place, and time. He appears well-developed and well-nourished. He is cooperative. He does not have a sickly appearance. No distress.   Accompanied by his wife. Alert and in fair spirits. Appeared older than stated age, chronically ill, and slightly pale. Able to climb onto the exam table.  No apparent distress.  No cyanosis, diaphoresis, or jaundice.   HENT:   Head: Atraumatic.   Right Ear: Tympanic membrane, external ear and ear canal normal.   Left Ear: Tympanic membrane, external ear and ear canal normal.   Mouth/Throat: Oropharynx is clear and moist.   Eyes: Pupils are equal, round, and reactive to light. EOM are normal. No scleral icterus.   Neck: No JVD present. Carotid bruit is not present. No tracheal deviation present. No thyromegaly present.   Cardiovascular: Normal rate, regular rhythm, S1 normal, S2 normal, normal heart sounds and intact distal pulses. Exam reveals no gallop.   No murmur heard.  Pulmonary/Chest: No accessory muscle usage. No respiratory distress. He has decreased breath sounds in the right lower field and the left lower field. He has wheezes (diffuse-primarily with forced expiration). He has no rales.   Mild pulmonary hyperinflation   Musculoskeletal: He exhibits no deformity.   No peripheral joint redness or warmth   Lymphadenopathy:        Head (right side): No submandibular adenopathy present.        Head (left side): No submandibular adenopathy present.     He has no cervical adenopathy.   Neurological: He is alert and oriented to person, place, and time. He displays no tremor. A sensory deficit (decreased vibration sense both feet) is present. No cranial nerve deficit. He exhibits abnormal muscle tone (mild - left upper extremity). Coordination abnormal.   Mild weakness left  upper extremity   Skin: Skin is warm and dry. No rash noted. He is not diaphoretic. No pallor. Nails show no clubbing.   Psychiatric: He has a normal mood and affect. His behavior is normal.     Assessment/Plan   Problems Addressed this Visit        Cardiovascular and Mediastinum    Essential hypertension   Hypertension: at goal. Evidence of target organ damage: coronary artery disease, heart failure, chronic kidney disease, stroke and right ICA stenosis.  Encouraged to continue to work on diet and exercise plan.   Continue current medication  Scheduled for updated labs     Relevant Orders    Comprehensive Metabolic Panel    Mixed hyperlipidemia  As above.   Continue current medication.    Relevant Orders    Lipid Panel    TSH    CAD (coronary artery disease)  Reminded regarding risk factor modification with an emphasis on tobacco cessation.  Continue current medication  Will arrange cardiology follow-up    Chronic systolic congestive heart failure (CMS/HCC)  As above.   Continue current medication.    PAD (peripheral artery disease) (CMS/HCC)   As above.   Continue current medication.       Respiratory    COPD mixed type (CMS/HCC)   COPD is stable.  Reminded of the importance of smoking cessation  Encouraged to remain as active as symptoms allow for    Chronic seasonal allergic rhinitis       Digestive    Vitamin D deficiency  Continue supplementation with monitoring.    Relevant Orders    Vitamin D 25 Hydroxy    Gastroesophageal reflux disease without esophagitis    Diverticulosis of large intestine without hemorrhage       Endocrine    Type 2 diabetes mellitus with peripheral neuropathy (CMS/HCC)  Diabetes mellitus Type II, under unknown control.   Encouraged to continue to pursue ADA diet  Encouraged aerobic exercise.  Continue current medication    Relevant Orders    Hemoglobin A1c    Vitamin B12    MicroAlbumin, Urine, Random - Urine, Clean Catch       Nervous and Auditory    Cerebral infarction due to  occlusion of right internal carotid artery (CMS/HCC)  As above.   Continue current medication.  Encouraged report if he should change his mind regarding a neurosurgical reevaluation       Genitourinary    Stage 3 chronic kidney disease (CMS/HCC)  Reminded to avoid any NSAIDs prescription or OTC  Will continue to monitor       Hematopoietic and Hemostatic    Iron deficiency anemia due to chronic blood loss  Will continue to monitor    Relevant Orders    CBC & Differential    Iron    Transferrin    Ferritin       Other    Chronic anxiety    Relevant Orders    TSH    Smoker  Reminded of the importance of smoking cessation    Relevant Orders    CT Chest Low Dose Wo    Healthcare maintenance  Recommended an updated Pneumovax 23  Encouraged to follow-up with hepatitis A #2  Will arrange an updated low-dose CT of the chest    Relevant Orders    CT Chest Low Dose Wo    Pneumococcal Polysaccharide Vaccine 23-Valent Greater Than or Equal To 1yo Subcutaneous / IM (Completed)

## 2020-01-01 ENCOUNTER — OFFICE VISIT (OUTPATIENT)
Dept: UROLOGY | Facility: CLINIC | Age: 70
End: 2020-01-01

## 2020-01-01 ENCOUNTER — APPOINTMENT (OUTPATIENT)
Dept: NUCLEAR MEDICINE | Facility: HOSPITAL | Age: 70
End: 2020-01-01

## 2020-01-01 ENCOUNTER — TELEPHONE (OUTPATIENT)
Dept: FAMILY MEDICINE CLINIC | Facility: CLINIC | Age: 70
End: 2020-01-01

## 2020-01-01 ENCOUNTER — HOSPITAL ENCOUNTER (OUTPATIENT)
Dept: NUCLEAR MEDICINE | Facility: HOSPITAL | Age: 70
End: 2020-01-01

## 2020-01-01 ENCOUNTER — PRIOR AUTHORIZATION (OUTPATIENT)
Dept: FAMILY MEDICINE CLINIC | Facility: CLINIC | Age: 70
End: 2020-01-01

## 2020-01-01 ENCOUNTER — APPOINTMENT (OUTPATIENT)
Dept: CT IMAGING | Facility: HOSPITAL | Age: 70
End: 2020-01-01

## 2020-01-01 ENCOUNTER — OFFICE VISIT (OUTPATIENT)
Dept: CARDIOLOGY | Facility: CLINIC | Age: 70
End: 2020-01-01

## 2020-01-01 ENCOUNTER — PROCEDURE VISIT (OUTPATIENT)
Dept: UROLOGY | Facility: CLINIC | Age: 70
End: 2020-01-01

## 2020-01-01 ENCOUNTER — HOSPITAL ENCOUNTER (OUTPATIENT)
Dept: CARDIOLOGY | Facility: HOSPITAL | Age: 70
End: 2020-01-01

## 2020-01-01 ENCOUNTER — TELEPHONE (OUTPATIENT)
Dept: GASTROENTEROLOGY | Facility: CLINIC | Age: 70
End: 2020-01-01

## 2020-01-01 ENCOUNTER — TELEPHONE (OUTPATIENT)
Dept: UROLOGY | Facility: CLINIC | Age: 70
End: 2020-01-01

## 2020-01-01 ENCOUNTER — OFFICE VISIT (OUTPATIENT)
Dept: FAMILY MEDICINE CLINIC | Facility: CLINIC | Age: 70
End: 2020-01-01

## 2020-01-01 ENCOUNTER — TELEPHONE (OUTPATIENT)
Dept: CARDIOLOGY | Facility: CLINIC | Age: 70
End: 2020-01-01

## 2020-01-01 ENCOUNTER — FLU SHOT (OUTPATIENT)
Dept: FAMILY MEDICINE CLINIC | Facility: CLINIC | Age: 70
End: 2020-01-01

## 2020-01-01 ENCOUNTER — RESULTS ENCOUNTER (OUTPATIENT)
Dept: UROLOGY | Facility: CLINIC | Age: 70
End: 2020-01-01

## 2020-01-01 ENCOUNTER — HOSPITAL ENCOUNTER (OUTPATIENT)
Dept: NUCLEAR MEDICINE | Facility: HOSPITAL | Age: 70
Discharge: HOME | End: 2020-07-23

## 2020-01-01 ENCOUNTER — APPOINTMENT (OUTPATIENT)
Dept: CARDIOLOGY | Facility: HOSPITAL | Age: 70
End: 2020-01-01

## 2020-01-01 ENCOUNTER — HOSPITAL ENCOUNTER (EMERGENCY)
Facility: HOSPITAL | Age: 70
Discharge: HOME | End: 2020-10-07
Attending: FAMILY MEDICINE | Admitting: FAMILY MEDICINE

## 2020-01-01 ENCOUNTER — HOSPITAL ENCOUNTER (OUTPATIENT)
Dept: CARDIOLOGY | Facility: HOSPITAL | Age: 70
Discharge: HOME | End: 2020-07-23

## 2020-01-01 ENCOUNTER — LAB (OUTPATIENT)
Dept: LAB | Facility: HOSPITAL | Age: 70
End: 2020-01-01

## 2020-01-01 ENCOUNTER — PATIENT OUTREACH (OUTPATIENT)
Dept: CASE MANAGEMENT | Facility: OTHER | Age: 70
End: 2020-01-01

## 2020-01-01 ENCOUNTER — PREP FOR SURGERY (OUTPATIENT)
Dept: OTHER | Facility: HOSPITAL | Age: 70
End: 2020-01-01

## 2020-01-01 VITALS
DIASTOLIC BLOOD PRESSURE: 68 MMHG | OXYGEN SATURATION: 97 % | SYSTOLIC BLOOD PRESSURE: 114 MMHG | BODY MASS INDEX: 23.49 KG/M2 | TEMPERATURE: 98.2 F | HEIGHT: 68 IN | WEIGHT: 155 LBS | HEART RATE: 82 BPM

## 2020-01-01 VITALS
DIASTOLIC BLOOD PRESSURE: 79 MMHG | TEMPERATURE: 98.6 F | HEIGHT: 68 IN | SYSTOLIC BLOOD PRESSURE: 132 MMHG | OXYGEN SATURATION: 99 % | WEIGHT: 142 LBS | HEART RATE: 92 BPM | BODY MASS INDEX: 21.52 KG/M2 | RESPIRATION RATE: 16 BRPM

## 2020-01-01 VITALS — TEMPERATURE: 98.2 F | HEIGHT: 68 IN | WEIGHT: 145 LBS | BODY MASS INDEX: 21.98 KG/M2

## 2020-01-01 VITALS — WEIGHT: 152 LBS | HEIGHT: 68 IN | TEMPERATURE: 98.5 F | BODY MASS INDEX: 23.04 KG/M2

## 2020-01-01 VITALS — TEMPERATURE: 97.7 F | HEIGHT: 68 IN | WEIGHT: 155 LBS | BODY MASS INDEX: 23.49 KG/M2

## 2020-01-01 VITALS
OXYGEN SATURATION: 98 % | HEART RATE: 83 BPM | BODY MASS INDEX: 24.55 KG/M2 | SYSTOLIC BLOOD PRESSURE: 120 MMHG | WEIGHT: 162 LBS | DIASTOLIC BLOOD PRESSURE: 64 MMHG | HEIGHT: 68 IN

## 2020-01-01 VITALS — HEIGHT: 68 IN | TEMPERATURE: 98.6 F | BODY MASS INDEX: 23.04 KG/M2 | WEIGHT: 152 LBS

## 2020-01-01 VITALS
BODY MASS INDEX: 23.95 KG/M2 | HEIGHT: 68 IN | TEMPERATURE: 98.6 F | SYSTOLIC BLOOD PRESSURE: 108 MMHG | RESPIRATION RATE: 14 BRPM | DIASTOLIC BLOOD PRESSURE: 60 MMHG | WEIGHT: 158 LBS | HEART RATE: 100 BPM | OXYGEN SATURATION: 96 %

## 2020-01-01 VITALS — WEIGHT: 141.98 LBS | BODY MASS INDEX: 21.52 KG/M2 | TEMPERATURE: 99.2 F | HEIGHT: 68 IN

## 2020-01-01 DIAGNOSIS — K57.30 DIVERTICULOSIS OF LARGE INTESTINE WITHOUT HEMORRHAGE: ICD-10-CM

## 2020-01-01 DIAGNOSIS — I25.5 ISCHEMIC CARDIOMYOPATHY: Primary | ICD-10-CM

## 2020-01-01 DIAGNOSIS — D49.4 BLADDER TUMOR: Primary | ICD-10-CM

## 2020-01-01 DIAGNOSIS — N18.30 STAGE 3 CHRONIC KIDNEY DISEASE (HCC): ICD-10-CM

## 2020-01-01 DIAGNOSIS — I10 ESSENTIAL HYPERTENSION: ICD-10-CM

## 2020-01-01 DIAGNOSIS — F41.9 CHRONIC ANXIETY: ICD-10-CM

## 2020-01-01 DIAGNOSIS — Z00.00 HEALTHCARE MAINTENANCE: ICD-10-CM

## 2020-01-01 DIAGNOSIS — E78.2 MIXED HYPERLIPIDEMIA: ICD-10-CM

## 2020-01-01 DIAGNOSIS — D49.4 BLADDER TUMOR: ICD-10-CM

## 2020-01-01 DIAGNOSIS — I25.5 ISCHEMIC CARDIOMYOPATHY: ICD-10-CM

## 2020-01-01 DIAGNOSIS — J44.9 COPD MIXED TYPE (HCC): ICD-10-CM

## 2020-01-01 DIAGNOSIS — R31.0 GROSS HEMATURIA: ICD-10-CM

## 2020-01-01 DIAGNOSIS — E55.9 VITAMIN D DEFICIENCY: ICD-10-CM

## 2020-01-01 DIAGNOSIS — J30.2 CHRONIC SEASONAL ALLERGIC RHINITIS: ICD-10-CM

## 2020-01-01 DIAGNOSIS — D50.0 IRON DEFICIENCY ANEMIA DUE TO CHRONIC BLOOD LOSS: ICD-10-CM

## 2020-01-01 DIAGNOSIS — R00.2 PALPITATIONS: ICD-10-CM

## 2020-01-01 DIAGNOSIS — Z79.2 PROPHYLACTIC ANTIBIOTIC: Primary | ICD-10-CM

## 2020-01-01 DIAGNOSIS — I50.22 CHRONIC SYSTOLIC CONGESTIVE HEART FAILURE (HCC): ICD-10-CM

## 2020-01-01 DIAGNOSIS — N52.02 CORPORO-VENOUS OCCLUSIVE ERECTILE DYSFUNCTION: Primary | ICD-10-CM

## 2020-01-01 DIAGNOSIS — E11.42 TYPE 2 DIABETES MELLITUS WITH PERIPHERAL NEUROPATHY (HCC): ICD-10-CM

## 2020-01-01 DIAGNOSIS — I63.231 CEREBRAL INFARCTION DUE TO OCCLUSION OF RIGHT INTERNAL CAROTID ARTERY (HCC): ICD-10-CM

## 2020-01-01 DIAGNOSIS — Z01.818 PREOPERATIVE CLEARANCE: ICD-10-CM

## 2020-01-01 DIAGNOSIS — F17.200 SMOKER: ICD-10-CM

## 2020-01-01 DIAGNOSIS — I25.10 CORONARY ARTERY DISEASE INVOLVING NATIVE CORONARY ARTERY OF NATIVE HEART WITHOUT ANGINA PECTORIS: ICD-10-CM

## 2020-01-01 DIAGNOSIS — N52.01 ERECTILE DYSFUNCTION DUE TO ARTERIAL INSUFFICIENCY: ICD-10-CM

## 2020-01-01 DIAGNOSIS — I73.9 PAD (PERIPHERAL ARTERY DISEASE) (HCC): Primary | ICD-10-CM

## 2020-01-01 DIAGNOSIS — T14.8XXA ABRASION: ICD-10-CM

## 2020-01-01 DIAGNOSIS — M54.9 MECHANICAL BACK PAIN: ICD-10-CM

## 2020-01-01 DIAGNOSIS — K21.9 GASTROESOPHAGEAL REFLUX DISEASE WITHOUT ESOPHAGITIS: ICD-10-CM

## 2020-01-01 DIAGNOSIS — I10 ESSENTIAL HYPERTENSION: Primary | ICD-10-CM

## 2020-01-01 DIAGNOSIS — R93.5 ABNORMAL CT OF THE ABDOMEN: Primary | ICD-10-CM

## 2020-01-01 DIAGNOSIS — Z23 NEED FOR INFLUENZA VACCINATION: ICD-10-CM

## 2020-01-01 DIAGNOSIS — N30.00 ACUTE CYSTITIS WITHOUT HEMATURIA: Primary | ICD-10-CM

## 2020-01-01 DIAGNOSIS — R31.9 HEMATURIA, UNSPECIFIED TYPE: ICD-10-CM

## 2020-01-01 DIAGNOSIS — R31.0 GROSS HEMATURIA: Primary | ICD-10-CM

## 2020-01-01 DIAGNOSIS — Z23 ENCOUNTER FOR IMMUNIZATION: ICD-10-CM

## 2020-01-01 LAB
25(OH)D3+25(OH)D2 SERPL-MCNC: 26.3 NG/ML (ref 30–100)
ALBUMIN SERPL-MCNC: 3.65 G/DL (ref 3.5–5.2)
ALBUMIN SERPL-MCNC: 4.4 G/DL (ref 3.5–5.2)
ALBUMIN/GLOB SERPL: 1.1 G/DL
ALBUMIN/GLOB SERPL: 1.8 G/DL
ALP SERPL-CCNC: 178 U/L (ref 39–117)
ALP SERPL-CCNC: 78 U/L (ref 39–117)
ALT SERPL W P-5'-P-CCNC: 61 U/L (ref 1–41)
ALT SERPL-CCNC: 25 U/L (ref 1–41)
ANION GAP SERPL CALCULATED.3IONS-SCNC: 12.1 MMOL/L (ref 5–15)
AST SERPL-CCNC: 10 U/L (ref 1–40)
AST SERPL-CCNC: 40 U/L (ref 1–40)
BACTERIA SPEC AEROBE CULT: ABNORMAL
BACTERIA SPEC AEROBE CULT: NORMAL
BACTERIA UR QL AUTO: ABNORMAL /HPF
BASOPHILS # BLD AUTO: 0.02 10*3/MM3 (ref 0–0.2)
BASOPHILS # BLD AUTO: 0.03 10*3/MM3 (ref 0–0.2)
BASOPHILS NFR BLD AUTO: 0.2 % (ref 0–1.5)
BASOPHILS NFR BLD AUTO: 0.2 % (ref 0–1.5)
BH CV ECHO MEAS - % IVS THICK: 22.8 %
BH CV ECHO MEAS - % LVPW THICK: 41.9 %
BH CV ECHO MEAS - ACS: 2.1 CM
BH CV ECHO MEAS - AO MAX PG: 9.4 MMHG
BH CV ECHO MEAS - AO MEAN PG: 4 MMHG
BH CV ECHO MEAS - AO ROOT AREA (BSA CORRECTED): 1.7
BH CV ECHO MEAS - AO ROOT AREA: 8 CM^2
BH CV ECHO MEAS - AO ROOT DIAM: 3.2 CM
BH CV ECHO MEAS - AO V2 MAX: 153 CM/SEC
BH CV ECHO MEAS - AO V2 MEAN: 86.5 CM/SEC
BH CV ECHO MEAS - AO V2 VTI: 28.3 CM
BH CV ECHO MEAS - BSA(HAYCOCK): 1.8 M^2
BH CV ECHO MEAS - BSA: 1.8 M^2
BH CV ECHO MEAS - BZI_BMI: 23.6 KILOGRAMS/M^2
BH CV ECHO MEAS - BZI_METRIC_HEIGHT: 172.7 CM
BH CV ECHO MEAS - BZI_METRIC_WEIGHT: 70.3 KG
BH CV ECHO MEAS - EDV(CUBED): 302.1 ML
BH CV ECHO MEAS - EDV(MOD-SP4): 149 ML
BH CV ECHO MEAS - EDV(TEICH): 232.1 ML
BH CV ECHO MEAS - EF(CUBED): 29 %
BH CV ECHO MEAS - EF(MOD-SP4): 27.5 %
BH CV ECHO MEAS - EF(TEICH): 22.9 %
BH CV ECHO MEAS - ESV(CUBED): 214.4 ML
BH CV ECHO MEAS - ESV(MOD-SP4): 108 ML
BH CV ECHO MEAS - ESV(TEICH): 179 ML
BH CV ECHO MEAS - FS: 10.8 %
BH CV ECHO MEAS - IVS/LVPW: 0.91
BH CV ECHO MEAS - IVSD: 0.89 CM
BH CV ECHO MEAS - IVSS: 1.1 CM
BH CV ECHO MEAS - LA DIMENSION: 3.6 CM
BH CV ECHO MEAS - LA/AO: 1.1
BH CV ECHO MEAS - LV DIASTOLIC VOL/BSA (35-75): 81.2 ML/M^2
BH CV ECHO MEAS - LV MASS(C)D: 274.2 GRAMS
BH CV ECHO MEAS - LV MASS(C)DI: 149.5 GRAMS/M^2
BH CV ECHO MEAS - LV MASS(C)S: 326.9 GRAMS
BH CV ECHO MEAS - LV MASS(C)SI: 178.2 GRAMS/M^2
BH CV ECHO MEAS - LV SYSTOLIC VOL/BSA (12-30): 58.9 ML/M^2
BH CV ECHO MEAS - LVIDD: 6.7 CM
BH CV ECHO MEAS - LVIDS: 6 CM
BH CV ECHO MEAS - LVLD AP4: 8.8 CM
BH CV ECHO MEAS - LVLS AP4: 7.8 CM
BH CV ECHO MEAS - LVOT AREA (M): 3.1 CM^2
BH CV ECHO MEAS - LVOT AREA: 3.1 CM^2
BH CV ECHO MEAS - LVOT DIAM: 2 CM
BH CV ECHO MEAS - LVPWD: 0.98 CM
BH CV ECHO MEAS - LVPWS: 1.4 CM
BH CV ECHO MEAS - MV A MAX VEL: 115 CM/SEC
BH CV ECHO MEAS - MV E MAX VEL: 41.6 CM/SEC
BH CV ECHO MEAS - MV E/A: 0.36
BH CV ECHO MEAS - PA ACC TIME: 0.08 SEC
BH CV ECHO MEAS - PA PR(ACCEL): 44.4 MMHG
BH CV ECHO MEAS - RAP SYSTOLE: 10 MMHG
BH CV ECHO MEAS - RVSP: 28.2 MMHG
BH CV ECHO MEAS - SI(AO): 124.1 ML/M^2
BH CV ECHO MEAS - SI(CUBED): 47.8 ML/M^2
BH CV ECHO MEAS - SI(MOD-SP4): 22.4 ML/M^2
BH CV ECHO MEAS - SI(TEICH): 29 ML/M^2
BH CV ECHO MEAS - SV(AO): 227.6 ML
BH CV ECHO MEAS - SV(CUBED): 87.7 ML
BH CV ECHO MEAS - SV(MOD-SP4): 41 ML
BH CV ECHO MEAS - SV(TEICH): 53.2 ML
BH CV ECHO MEAS - TR MAX VEL: 213 CM/SEC
BILIRUB SERPL-MCNC: 0.2 MG/DL (ref 0.2–1.2)
BILIRUB SERPL-MCNC: 0.2 MG/DL (ref 0–1.2)
BILIRUB UR QL STRIP: NEGATIVE
BUN SERPL-MCNC: 22 MG/DL (ref 8–23)
BUN SERPL-MCNC: 26 MG/DL (ref 8–23)
BUN/CREAT SERPL: 16.1 (ref 7–25)
BUN/CREAT SERPL: 17.7 (ref 7–25)
CALCIUM SERPL-MCNC: 9.8 MG/DL (ref 8.6–10.5)
CALCIUM SPEC-SCNC: 9.9 MG/DL (ref 8.6–10.5)
CHLORIDE SERPL-SCNC: 91 MMOL/L (ref 98–107)
CHLORIDE SERPL-SCNC: 95 MMOL/L (ref 98–107)
CHOLEST SERPL-MCNC: 121 MG/DL (ref 0–200)
CLARITY UR: ABNORMAL
CO2 SERPL-SCNC: 22.6 MMOL/L (ref 22–29)
CO2 SERPL-SCNC: 23.9 MMOL/L (ref 22–29)
COLOR UR: ABNORMAL
CREAT SERPL-MCNC: 1.37 MG/DL (ref 0.76–1.27)
CREAT SERPL-MCNC: 1.47 MG/DL (ref 0.76–1.27)
CRP SERPL-MCNC: 5.1 MG/DL (ref 0–0.5)
D-LACTATE SERPL-SCNC: 1.8 MMOL/L (ref 0.5–2)
DEPRECATED RDW RBC AUTO: 50.1 FL (ref 37–54)
EOSINOPHIL # BLD AUTO: 0.04 10*3/MM3 (ref 0–0.4)
EOSINOPHIL # BLD AUTO: 0.09 10*3/MM3 (ref 0–0.4)
EOSINOPHIL NFR BLD AUTO: 0.3 % (ref 0.3–6.2)
EOSINOPHIL NFR BLD AUTO: 0.9 % (ref 0.3–6.2)
ERYTHROCYTE [DISTWIDTH] IN BLOOD BY AUTOMATED COUNT: 17.9 % (ref 12.3–15.4)
ERYTHROCYTE [DISTWIDTH] IN BLOOD BY AUTOMATED COUNT: 18.6 % (ref 12.3–15.4)
FERRITIN SERPL-MCNC: 17 NG/ML (ref 30–400)
GFR SERPL CREATININE-BSD FRML MDRD: 52 ML/MIN/1.73
GLOBULIN SER CALC-MCNC: 2.5 GM/DL
GLOBULIN UR ELPH-MCNC: 3.5 GM/DL
GLUCOSE BLDC GLUCOMTR-MCNC: 422 MG/DL (ref 70–130)
GLUCOSE SERPL-MCNC: 484 MG/DL (ref 65–99)
GLUCOSE SERPL-MCNC: 553 MG/DL (ref 65–99)
GLUCOSE UR STRIP-MCNC: ABNORMAL MG/DL
HBA1C MFR BLD: 13.9 % (ref 4.8–5.6)
HCT VFR BLD AUTO: 39 % (ref 37.5–51)
HCT VFR BLD AUTO: 41.8 % (ref 37.5–51)
HDLC SERPL-MCNC: 33 MG/DL (ref 40–60)
HGB BLD-MCNC: 12.3 G/DL (ref 13–17.7)
HGB BLD-MCNC: 12.5 G/DL (ref 13–17.7)
HGB UR QL STRIP.AUTO: ABNORMAL
HYALINE CASTS UR QL AUTO: ABNORMAL /LPF
IMM GRANULOCYTES # BLD AUTO: 0.04 10*3/MM3 (ref 0–0.05)
IMM GRANULOCYTES # BLD AUTO: 0.05 10*3/MM3 (ref 0–0.05)
IMM GRANULOCYTES NFR BLD AUTO: 0.3 % (ref 0–0.5)
IMM GRANULOCYTES NFR BLD AUTO: 0.4 % (ref 0–0.5)
IRON SERPL-MCNC: 29 MCG/DL (ref 59–158)
KETONES UR QL STRIP: NEGATIVE
LDLC SERPL CALC-MCNC: 31 MG/DL (ref 0–100)
LEUKOCYTE ESTERASE UR QL STRIP.AUTO: ABNORMAL
LYMPHOCYTES # BLD AUTO: 0.86 10*3/MM3 (ref 0.7–3.1)
LYMPHOCYTES # BLD AUTO: 1.69 10*3/MM3 (ref 0.7–3.1)
LYMPHOCYTES NFR BLD AUTO: 16.5 % (ref 19.6–45.3)
LYMPHOCYTES NFR BLD AUTO: 5.6 % (ref 19.6–45.3)
MAGNESIUM SERPL-MCNC: 1.5 MG/DL (ref 1.6–2.4)
MAXIMAL PREDICTED HEART RATE: 152 BPM
MCH RBC QN AUTO: 23.1 PG (ref 26.6–33)
MCH RBC QN AUTO: 24.7 PG (ref 26.6–33)
MCHC RBC AUTO-ENTMCNC: 29.9 G/DL (ref 31.5–35.7)
MCHC RBC AUTO-ENTMCNC: 31.5 G/DL (ref 31.5–35.7)
MCV RBC AUTO: 77.3 FL (ref 79–97)
MCV RBC AUTO: 78.3 FL (ref 79–97)
MONOCYTES # BLD AUTO: 0.67 10*3/MM3 (ref 0.1–0.9)
MONOCYTES # BLD AUTO: 1.04 10*3/MM3 (ref 0.1–0.9)
MONOCYTES NFR BLD AUTO: 6.5 % (ref 5–12)
MONOCYTES NFR BLD AUTO: 6.8 % (ref 5–12)
NEUTROPHILS # BLD AUTO: 7.75 10*3/MM3 (ref 1.7–7)
NEUTROPHILS NFR BLD AUTO: 13.21 10*3/MM3 (ref 1.7–7)
NEUTROPHILS NFR BLD AUTO: 75.5 % (ref 42.7–76)
NEUTROPHILS NFR BLD AUTO: 86.8 % (ref 42.7–76)
NITRITE UR QL STRIP: POSITIVE
NRBC BLD AUTO-RTO: 0 /100 WBC (ref 0–0.2)
NRBC BLD AUTO-RTO: 0.1 /100 WBC (ref 0–0.2)
NT-PROBNP SERPL-MCNC: 1619 PG/ML (ref 0–900)
NT-PROBNP SERPL-MCNC: 542 PG/ML (ref 0–376)
PH UR STRIP.AUTO: 6.5 [PH] (ref 5–8)
PLATELET # BLD AUTO: 320 10*3/MM3 (ref 140–450)
PLATELET # BLD AUTO: 345 10*3/MM3 (ref 140–450)
PMV BLD AUTO: 8.8 FL (ref 6–12)
POTASSIUM SERPL-SCNC: 3.9 MMOL/L (ref 3.5–5.2)
POTASSIUM SERPL-SCNC: 4.9 MMOL/L (ref 3.5–5.2)
PROT SERPL-MCNC: 6.9 G/DL (ref 6–8.5)
PROT SERPL-MCNC: 7.1 G/DL (ref 6–8.5)
PROT UR QL STRIP: ABNORMAL
RBC # BLD AUTO: 4.98 10*6/MM3 (ref 4.14–5.8)
RBC # BLD AUTO: 5.41 10*6/MM3 (ref 4.14–5.8)
RBC # UR: ABNORMAL /HPF
REF LAB TEST METHOD: ABNORMAL
SODIUM SERPL-SCNC: 128 MMOL/L (ref 136–145)
SODIUM SERPL-SCNC: 131 MMOL/L (ref 136–145)
SP GR UR STRIP: 1.02 (ref 1–1.03)
SQUAMOUS #/AREA URNS HPF: ABNORMAL /HPF
STRESS TARGET HR: 129 BPM
TRANSFERRIN SERPL-MCNC: 327 MG/DL (ref 200–370)
TRIGL SERPL-MCNC: 284 MG/DL (ref 0–150)
TROPONIN T SERPL-MCNC: 0.02 NG/ML (ref 0–0.03)
TSH SERPL DL<=0.005 MIU/L-ACNC: 1.58 UIU/ML (ref 0.27–4.2)
TSH SERPL DL<=0.05 MIU/L-ACNC: 2 UIU/ML (ref 0.27–4.2)
UROBILINOGEN UR QL STRIP: ABNORMAL
VIT B12 SERPL-MCNC: 737 PG/ML (ref 211–946)
VLDLC SERPL CALC-MCNC: 56.8 MG/DL
WBC # BLD AUTO: 10.26 10*3/MM3 (ref 3.4–10.8)
WBC # BLD AUTO: 15.23 10*3/MM3 (ref 3.4–10.8)
WBC UR QL AUTO: ABNORMAL /HPF
YEAST URNS QL MICRO: ABNORMAL /HPF

## 2020-01-01 PROCEDURE — 70450 CT HEAD/BRAIN W/O DYE: CPT

## 2020-01-01 PROCEDURE — 99442 PR PHYS/QHP TELEPHONE EVALUATION 11-20 MIN: CPT | Performed by: GENERAL PRACTICE

## 2020-01-01 PROCEDURE — 83605 ASSAY OF LACTIC ACID: CPT | Performed by: FAMILY MEDICINE

## 2020-01-01 PROCEDURE — 81001 URINALYSIS AUTO W/SCOPE: CPT | Performed by: FAMILY MEDICINE

## 2020-01-01 PROCEDURE — 80053 COMPREHEN METABOLIC PANEL: CPT | Performed by: FAMILY MEDICINE

## 2020-01-01 PROCEDURE — 72125 CT NECK SPINE W/O DYE: CPT

## 2020-01-01 PROCEDURE — 99442 PR PHYS/QHP TELEPHONE EVALUATION 11-20 MIN: CPT | Performed by: INTERNAL MEDICINE

## 2020-01-01 PROCEDURE — 96375 TX/PRO/DX INJ NEW DRUG ADDON: CPT

## 2020-01-01 PROCEDURE — 99213 OFFICE O/P EST LOW 20 MIN: CPT | Performed by: UROLOGY

## 2020-01-01 PROCEDURE — 84484 ASSAY OF TROPONIN QUANT: CPT | Performed by: FAMILY MEDICINE

## 2020-01-01 PROCEDURE — 93000 ELECTROCARDIOGRAM COMPLETE: CPT | Performed by: INTERNAL MEDICINE

## 2020-01-01 PROCEDURE — 87040 BLOOD CULTURE FOR BACTERIA: CPT | Performed by: FAMILY MEDICINE

## 2020-01-01 PROCEDURE — 96365 THER/PROPH/DIAG IV INF INIT: CPT

## 2020-01-01 PROCEDURE — 83735 ASSAY OF MAGNESIUM: CPT | Performed by: FAMILY MEDICINE

## 2020-01-01 PROCEDURE — 83880 ASSAY OF NATRIURETIC PEPTIDE: CPT

## 2020-01-01 PROCEDURE — 99214 OFFICE O/P EST MOD 30 MIN: CPT | Performed by: UROLOGY

## 2020-01-01 PROCEDURE — 87186 SC STD MICRODIL/AGAR DIL: CPT | Performed by: FAMILY MEDICINE

## 2020-01-01 PROCEDURE — 85025 COMPLETE CBC W/AUTO DIFF WBC: CPT | Performed by: FAMILY MEDICINE

## 2020-01-01 PROCEDURE — 25010000002 MAGNESIUM SULFATE IN D5W 1G/100ML (PREMIX) 1-5 GM/100ML-% SOLUTION: Performed by: FAMILY MEDICINE

## 2020-01-01 PROCEDURE — 90715 TDAP VACCINE 7 YRS/> IM: CPT | Performed by: GENERAL PRACTICE

## 2020-01-01 PROCEDURE — 36415 COLL VENOUS BLD VENIPUNCTURE: CPT | Performed by: GENERAL PRACTICE

## 2020-01-01 PROCEDURE — 93306 TTE W/DOPPLER COMPLETE: CPT | Performed by: INTERNAL MEDICINE

## 2020-01-01 PROCEDURE — 87086 URINE CULTURE/COLONY COUNT: CPT | Performed by: FAMILY MEDICINE

## 2020-01-01 PROCEDURE — 25010000002 CEFTRIAXONE: Performed by: FAMILY MEDICINE

## 2020-01-01 PROCEDURE — 83880 ASSAY OF NATRIURETIC PEPTIDE: CPT | Performed by: FAMILY MEDICINE

## 2020-01-01 PROCEDURE — 90471 IMMUNIZATION ADMIN: CPT | Performed by: GENERAL PRACTICE

## 2020-01-01 PROCEDURE — 74176 CT ABD & PELVIS W/O CONTRAST: CPT

## 2020-01-01 PROCEDURE — 99203 OFFICE O/P NEW LOW 30 MIN: CPT | Performed by: UROLOGY

## 2020-01-01 PROCEDURE — 63710000001 INSULIN REGULAR HUMAN PER 5 UNITS: Performed by: FAMILY MEDICINE

## 2020-01-01 PROCEDURE — 36415 COLL VENOUS BLD VENIPUNCTURE: CPT

## 2020-01-01 PROCEDURE — 99213 OFFICE O/P EST LOW 20 MIN: CPT | Performed by: INTERNAL MEDICINE

## 2020-01-01 PROCEDURE — 52000 CYSTOURETHROSCOPY: CPT | Performed by: UROLOGY

## 2020-01-01 PROCEDURE — 99284 EMERGENCY DEPT VISIT MOD MDM: CPT

## 2020-01-01 PROCEDURE — 99214 OFFICE O/P EST MOD 30 MIN: CPT | Performed by: INTERNAL MEDICINE

## 2020-01-01 PROCEDURE — 87088 URINE BACTERIA CULTURE: CPT | Performed by: FAMILY MEDICINE

## 2020-01-01 PROCEDURE — 84443 ASSAY THYROID STIM HORMONE: CPT | Performed by: FAMILY MEDICINE

## 2020-01-01 PROCEDURE — 51702 INSERT TEMP BLADDER CATH: CPT

## 2020-01-01 PROCEDURE — 93306 TTE W/DOPPLER COMPLETE: CPT

## 2020-01-01 PROCEDURE — 82962 GLUCOSE BLOOD TEST: CPT

## 2020-01-01 PROCEDURE — 99214 OFFICE O/P EST MOD 30 MIN: CPT | Performed by: GENERAL PRACTICE

## 2020-01-01 PROCEDURE — 90686 IIV4 VACC NO PRSV 0.5 ML IM: CPT | Performed by: GENERAL PRACTICE

## 2020-01-01 PROCEDURE — 96372 THER/PROPH/DIAG INJ SC/IM: CPT | Performed by: UROLOGY

## 2020-01-01 PROCEDURE — 86140 C-REACTIVE PROTEIN: CPT | Performed by: FAMILY MEDICINE

## 2020-01-01 RX ORDER — CLOPIDOGREL BISULFATE 75 MG/1
TABLET ORAL
Qty: 30 TABLET | Refills: 4 | Status: SHIPPED | OUTPATIENT
Start: 2020-01-01

## 2020-01-01 RX ORDER — GABAPENTIN 300 MG/1
300 CAPSULE ORAL 3 TIMES DAILY
COMMUNITY
Start: 2020-01-01

## 2020-01-01 RX ORDER — HYDROMORPHONE HYDROCHLORIDE 2 MG/1
2 TABLET ORAL EVERY 6 HOURS PRN
Qty: 30 TABLET | Refills: 0 | Status: SHIPPED | OUTPATIENT
Start: 2020-01-01 | End: 2020-01-01 | Stop reason: SDUPTHER

## 2020-01-01 RX ORDER — HYDROMORPHONE HYDROCHLORIDE 2 MG/1
2 TABLET ORAL EVERY 6 HOURS PRN
Qty: 30 TABLET | Refills: 0 | Status: SHIPPED | OUTPATIENT
Start: 2020-01-01

## 2020-01-01 RX ORDER — DULOXETIN HYDROCHLORIDE 60 MG/1
60 CAPSULE, DELAYED RELEASE ORAL DAILY
Qty: 30 CAPSULE | Refills: 5 | Status: SHIPPED | OUTPATIENT
Start: 2020-01-01 | End: 2020-01-01

## 2020-01-01 RX ORDER — ROSUVASTATIN CALCIUM 20 MG/1
TABLET, COATED ORAL
Qty: 30 TABLET | Refills: 11 | Status: SHIPPED | OUTPATIENT
Start: 2020-01-01

## 2020-01-01 RX ORDER — OXYCODONE HYDROCHLORIDE AND ACETAMINOPHEN 5; 325 MG/1; MG/1
1 TABLET ORAL EVERY 8 HOURS PRN
Qty: 12 TABLET | Refills: 0 | Status: SHIPPED | OUTPATIENT
Start: 2020-01-01

## 2020-01-01 RX ORDER — GENTAMICIN SULFATE 40 MG/ML
80 INJECTION, SOLUTION INTRAMUSCULAR; INTRAVENOUS ONCE
Status: COMPLETED | OUTPATIENT
Start: 2020-01-01 | End: 2020-01-01

## 2020-01-01 RX ORDER — ESOMEPRAZOLE MAGNESIUM 40 MG/1
40 CAPSULE, DELAYED RELEASE ORAL DAILY
Qty: 30 CAPSULE | Refills: 5 | Status: SHIPPED | OUTPATIENT
Start: 2020-01-01

## 2020-01-01 RX ORDER — OXYCODONE HYDROCHLORIDE AND ACETAMINOPHEN 5; 325 MG/1; MG/1
1 TABLET ORAL ONCE
Status: COMPLETED | OUTPATIENT
Start: 2020-01-01 | End: 2020-01-01

## 2020-01-01 RX ORDER — DULOXETIN HYDROCHLORIDE 60 MG/1
CAPSULE, DELAYED RELEASE ORAL
Qty: 30 CAPSULE | Refills: 5 | Status: SHIPPED | OUTPATIENT
Start: 2020-01-01

## 2020-01-01 RX ORDER — PREGABALIN 75 MG/1
1 CAPSULE ORAL 2 TIMES DAILY
COMMUNITY
Start: 2020-01-01

## 2020-01-01 RX ORDER — MAGNESIUM SULFATE 1 G/100ML
1 INJECTION INTRAVENOUS ONCE
Status: COMPLETED | OUTPATIENT
Start: 2020-01-01 | End: 2020-01-01

## 2020-01-01 RX ORDER — ESOMEPRAZOLE MAGNESIUM 40 MG/1
CAPSULE, DELAYED RELEASE ORAL
Qty: 30 CAPSULE | Refills: 5 | Status: SHIPPED | OUTPATIENT
Start: 2020-01-01 | End: 2020-01-01

## 2020-01-01 RX ORDER — GENTAMICIN SULFATE 80 MG/100ML
80 INJECTION, SOLUTION INTRAVENOUS ONCE
Status: CANCELLED | OUTPATIENT
Start: 2020-01-01 | End: 2020-01-01

## 2020-01-01 RX ORDER — INSULIN DETEMIR 100 [IU]/ML
INJECTION, SOLUTION SUBCUTANEOUS
Qty: 12 ML | Refills: 5 | Status: SHIPPED | OUTPATIENT
Start: 2020-01-01

## 2020-01-01 RX ORDER — ESOMEPRAZOLE MAGNESIUM 40 MG/1
CAPSULE, DELAYED RELEASE ORAL
Qty: 30 CAPSULE | Refills: 5 | Status: SHIPPED | OUTPATIENT
Start: 2020-01-01 | End: 2020-01-01 | Stop reason: SDUPTHER

## 2020-01-01 RX ORDER — INSULIN DETEMIR 100 [IU]/ML
INJECTION, SOLUTION SUBCUTANEOUS
Qty: 12 ML | Refills: 5 | Status: SHIPPED | OUTPATIENT
Start: 2020-01-01 | End: 2020-01-01

## 2020-01-01 RX ORDER — HYDROCODONE BITARTRATE AND ACETAMINOPHEN 10; 325 MG/1; MG/1
TABLET ORAL AS NEEDED
COMMUNITY
Start: 2020-01-01

## 2020-01-01 RX ORDER — PHENAZOPYRIDINE HYDROCHLORIDE 200 MG/1
1 TABLET, FILM COATED ORAL 3 TIMES DAILY
COMMUNITY
Start: 2020-01-01

## 2020-01-01 RX ORDER — PEN NEEDLE, DIABETIC 32GX 5/32"
NEEDLE, DISPOSABLE MISCELLANEOUS
Qty: 100 EACH | Refills: 5 | Status: SHIPPED | OUTPATIENT
Start: 2020-01-01

## 2020-01-01 RX ORDER — HYDROCODONE BITARTRATE AND ACETAMINOPHEN 5; 325 MG/1; MG/1
1 TABLET ORAL ONCE
Status: COMPLETED | OUTPATIENT
Start: 2020-01-01 | End: 2020-01-01

## 2020-01-01 RX ORDER — SODIUM CHLORIDE 0.9 % (FLUSH) 0.9 %
10 SYRINGE (ML) INJECTION AS NEEDED
Status: DISCONTINUED | OUTPATIENT
Start: 2020-01-01 | End: 2020-01-01 | Stop reason: HOSPADM

## 2020-01-01 RX ORDER — OXYCODONE AND ACETAMINOPHEN 10; 325 MG/1; MG/1
1 TABLET ORAL EVERY 6 HOURS PRN
Qty: 30 TABLET | Refills: 0 | Status: SHIPPED | OUTPATIENT
Start: 2020-01-01

## 2020-01-01 RX ORDER — CEFUROXIME AXETIL 500 MG/1
1 TABLET ORAL 2 TIMES DAILY
COMMUNITY
Start: 2020-01-01

## 2020-01-01 RX ORDER — GLUCOSAMINE HCL/CHONDROITIN SU 500-400 MG
CAPSULE ORAL
Qty: 120 EACH | Refills: 5 | Status: SHIPPED | OUTPATIENT
Start: 2020-01-01

## 2020-01-01 RX ORDER — CEFDINIR 300 MG/1
300 CAPSULE ORAL 2 TIMES DAILY
Qty: 14 CAPSULE | Refills: 0 | Status: SHIPPED | OUTPATIENT
Start: 2020-01-01

## 2020-01-01 RX ADMIN — OXYCODONE HYDROCHLORIDE AND ACETAMINOPHEN 1 TABLET: 5; 325 TABLET ORAL at 02:38

## 2020-01-01 RX ADMIN — SODIUM CHLORIDE 1000 ML: 9 INJECTION, SOLUTION INTRAVENOUS at 01:19

## 2020-01-01 RX ADMIN — HUMAN INSULIN 10 UNITS: 100 INJECTION, SOLUTION SUBCUTANEOUS at 03:20

## 2020-01-01 RX ADMIN — MAGNESIUM SULFATE IN DEXTROSE 1 G: 10 INJECTION, SOLUTION INTRAVENOUS at 02:26

## 2020-01-01 RX ADMIN — CEFTRIAXONE 1 G: 1 INJECTION, POWDER, FOR SOLUTION INTRAMUSCULAR; INTRAVENOUS at 02:25

## 2020-01-01 RX ADMIN — GENTAMICIN SULFATE 80 MG: 40 INJECTION, SOLUTION INTRAMUSCULAR; INTRAVENOUS at 10:01

## 2020-01-01 RX ADMIN — HYDROCODONE BITARTRATE AND ACETAMINOPHEN 1 TABLET: 5; 325 TABLET ORAL at 00:56

## 2020-01-01 RX ADMIN — HUMAN INSULIN 10 UNITS: 100 INJECTION, SOLUTION SUBCUTANEOUS at 02:25

## 2020-01-28 PROBLEM — I42.8 NON-ISCHEMIC CARDIOMYOPATHY (HCC): Status: ACTIVE | Noted: 2020-01-01

## 2020-01-28 NOTE — PROGRESS NOTES
subjective     Chief Complaint   Patient presents with   • Coronary Artery Disease   • Hypertension   • Extremity Weakness     bilat legs, worse in the morning     History of Present Illness  Patient is 69 years old white male who is here for cardiology follow-up.  Patient has known coronary artery disease with prior myocardial infarction and stroke 2014.  Patient at that time was found to have LV apical thrombus.  Patient declined ICD heart transplant.  Patient had coronary angiography done at .  According to patient he was told that he is not a candidate for surgical intervention or transcutaneous intervention however he was told that he has severe blockages.  Patient is taking Entresto but he is not sure if he is taking Coreg or Lasix.  Patient was supposed to bring his medications with him but he did not bring the list and does not know the medications.  He is accompanied by his son who also does not know.    He is taking dual antiplatelet therapy.  He has hyperlipidemia and is taking Crestor    Patient has no cardiac complaints but complains of leg pains and generalized myalgia and wants some medications.  He is taking Cymbalta.  He tells me that his diabetic control is good and is following closely with Dr. Cortez.      Past Surgical History:   Procedure Laterality Date   • CARDIAC SURGERY      Open heart      Family History   Problem Relation Age of Onset   • Vision loss Other    • Coronary artery disease Other    • Diabetes Mother    • Arthritis Mother    • Heart attack Father    • Heart disease Sister    • Seizures Sister    • Heart disease Brother      Past Medical History:   Diagnosis Date   • Allergic rhinitis    • Anxiety    • CAD (coronary artery disease)    • CHF (congestive heart failure) (CMS/[facility])    • COPD (chronic obstructive pulmonary disease) (CMS/[facility])    • Diabetes mellitus (CMS/[facility])     TYPE ll   • Diverticulosis    • Erectile dysfunction    • Gastritis    • GERD (gastroesophageal reflux  disease)    • Head injury 2/1/2018   • Hx of long-term (current) use of anticoagulants    • Hyperlipidemia    • Hypertension    • Iron deficiency    • Mechanical back pain    • Non-small cell carcinoma of lung (CMS/HCC)    • Smoker 8/8/2016   • Stroke (CMS/HCC) 06/06/2019    left   • Vitamin D deficiency      Patient Active Problem List   Diagnosis   • Essential hypertension   • Mixed hyperlipidemia   • CAD (coronary artery disease)   • Chronic systolic congestive heart failure (CMS/[facility])   • COPD mixed type (CMS/[facility])   • Type 2 diabetes mellitus with peripheral neuropathy (CMS/[facility])   • Vitamin D deficiency   • Mechanical back pain   • Gastroesophageal reflux disease without esophagitis   • Chronic seasonal allergic rhinitis   • Chronic anxiety   • Diverticulosis of large intestine without hemorrhage   • Vasculogenic erectile dysfunction   • Smoker   • Healthcare maintenance   • Iron deficiency anemia due to chronic blood loss   • Ischemic cardiomyopathy, LV ejection fraction 26%   • Bulging lumbar disc   • Stage 3 chronic kidney disease (CMS/[facility])   • Cerebral infarction due to occlusion of right internal carotid artery (CMS/[facility])   • PAD (peripheral artery disease) (CMS/[facility])       Social History     Tobacco Use   • Smoking status: Former Smoker     Years: 50.00     Types: Cigarettes   • Smokeless tobacco: Never Used   Substance Use Topics   • Alcohol use: No   • Drug use: No       Not on File    Current Outpatient Medications on File Prior to Visit   Medication Sig   • albuterol sulfate  (90 Base) MCG/ACT inhaler Inhale 2 puffs Every 6 (Six) Hours As Needed for Wheezing.   • aspirin 81 MG chewable tablet Chew 1 tablet daily.   • carvedilol (COREG) 12.5 MG tablet Take 1 tablet by mouth 2 (Two) Times a Day With Meals.   • clopidogrel (PLAVIX) 75 MG tablet Take 1 tablet by mouth Daily.   • DULoxetine (CYMBALTA) 60 MG capsule TAKE 1 CAPSULE BY MOUTH ONCE DAILY   • EASY COMFORT LANCETS misc USE TO TEST BLOOD SUGAR  "FOUR TIMES DAILY AS DIRECTED   • esomeprazole (nexIUM) 40 MG capsule Take 1 capsule by mouth Daily.   • furosemide (LASIX) 40 MG tablet Take 1 tablet by mouth Every Morning.   • Glucose Blood (BLOOD GLUCOSE TEST) strip 1 four times daily   • insulin aspart (novoLOG FLEXPEN) 100 UNIT/ML solution pen-injector sc pen Tid with meals as per sliding scale   • insulin detemir (LEVEMIR) 100 UNIT/ML injection Inject 45 Units under the skin into the appropriate area as directed Daily.   • Insulin Pen Needle 32G X 4 MM misc 1 each 4 (Four) Times a Day.   • Insulin Syringe 28G X 1/2\" 0.5 ML misc 2 (two) times a day.   • nitroglycerin (NITROSTAT) 0.4 MG SL tablet Place 1 tablet under the tongue Every 5 (Five) Minutes As Needed for Chest Pain. Take no more than 3 doses in 15 minutes.   • rosuvastatin (CRESTOR) 20 MG tablet TAKE 1 Tablet BY MOUTH EVERY DAY   • sacubitril-valsartan (ENTRESTO) 24-26 MG tablet Take 1 tablet by mouth 2 (Two) Times a Day.   • Zoster Vac Recomb Adjuvanted (SHINGRIX) 50 MCG/0.5ML reconstituted suspension Inject 50 mcg into the appropriate muscle as directed by prescriber See Admin Instructions. Repeat in 2-6 months     No current facility-administered medications on file prior to visit.          The following portions of the patient's history were reviewed and updated as appropriate: allergies, current medications, past family history, past medical history, past social history, past surgical history and problem list.    Review of Systems   Constitution: Positive for malaise/fatigue.   HENT: Negative.  Negative for congestion.    Eyes: Negative.    Cardiovascular: Negative.  Negative for chest pain, cyanosis, dyspnea on exertion, irregular heartbeat, leg swelling, near-syncope, orthopnea, palpitations, paroxysmal nocturnal dyspnea and syncope.   Respiratory: Negative.  Negative for shortness of breath.    Hematologic/Lymphatic: Negative.    Musculoskeletal: Positive for arthritis, myalgias and stiffness. " "  Gastrointestinal: Negative.    Neurological: Negative.  Negative for headaches.   Psychiatric/Behavioral: The patient is nervous/anxious.           Objective:     /64   Pulse 83   Ht 172.7 cm (68\")   Wt 73.5 kg (162 lb)   SpO2 98%   BMI 24.63 kg/m²   Physical Exam   Constitutional: He appears well-developed and well-nourished. No distress.   HENT:   Head: Normocephalic and atraumatic.   Mouth/Throat: Oropharynx is clear and moist. No oropharyngeal exudate.   Eyes: Pupils are equal, round, and reactive to light. Conjunctivae and EOM are normal. No scleral icterus.   Neck: Normal range of motion. Neck supple. No JVD present. No tracheal deviation present. No thyromegaly present.   Cardiovascular: Normal rate, regular rhythm, normal heart sounds and intact distal pulses. PMI is not displaced. Exam reveals no gallop, no friction rub and no decreased pulses.   No murmur heard.  Pulses:       Carotid pulses are 3+ on the right side, and 3+ on the left side.       Radial pulses are 3+ on the right side, and 3+ on the left side.   Pulmonary/Chest: Effort normal and breath sounds normal. No respiratory distress. He has no wheezes. He has no rales. He exhibits no tenderness.   Abdominal: Soft. Bowel sounds are normal. He exhibits no distension, no abdominal bruit and no mass. There is no splenomegaly or hepatomegaly. There is no tenderness. There is no rebound and no guarding.   Musculoskeletal: Normal range of motion. He exhibits no edema, tenderness or deformity.   Lymphadenopathy:     He has no cervical adenopathy.   Neurological: He is alert. He has normal reflexes. No cranial nerve deficit. He exhibits normal muscle tone. Coordination normal.   Skin: Skin is warm and dry. No rash noted. He is not diaphoretic. No erythema.   Psychiatric: He has a normal mood and affect. His behavior is normal. Judgment and thought content normal.         Lab Review  Lab Results   Component Value Date     08/01/2019    K " 4.3 08/01/2019    CL 98 08/01/2019    BUN 24 (H) 08/01/2019    CREATININE 1.41 (H) 08/01/2019    GLUCOSE 219 (H) 04/11/2017     (H) 08/01/2019    CALCIUM 9.7 08/01/2019    ALT 41 08/01/2019    ALKPHOS 58 08/01/2019    LABIL2 1.8 08/01/2019     No results found for: CKTOTAL  Lab Results   Component Value Date    WBC 9.32 08/01/2019    HGB 9.3 (L) 08/01/2019    HCT 35.9 (L) 08/01/2019     08/01/2019     Lab Results   Component Value Date    INR 1.06 12/12/2018    INR 1.89 (H) 05/11/2018    INR 2.04 (H) 02/09/2017     No results found for: MG  Lab Results   Component Value Date    TSH 1.600 06/10/2019     No results found for: BNP  Lab Results   Component Value Date    CHLPL 105 08/01/2019    CHOL 305 (H) 04/11/2017    TRIG 220 (H) 08/01/2019    HDL 34 (L) 08/01/2019    VLDL 44 08/01/2019    LDLHDL  04/11/2017      Comment:      Unable to calculate     Lab Results   Component Value Date    LDL 27 08/01/2019    LDL 37 06/10/2019         ECG 12 Lead  Date/Time: 1/28/2020 5:02 PM  Performed by: Kamari Marcus MD  Authorized by: Kamari Marcus MD   Comparison: compared with previous ECG from 6/6/2019  Similar to previous ECG  Rhythm: sinus rhythm  Ectopy: atrial premature contractions  Rate: normal  BPM: 83  Conduction: left bundle branch block  QRS axis: normal    Clinical impression: abnormal EKG               I personally viewed and interpreted the patient's LAB data         Assessment:     1. Ischemic cardiomyopathy    2. Palpitations    3. Mixed hyperlipidemia    4. Essential hypertension    5. Chronic systolic congestive heart failure (CMS/HCC)          Plan:     Patient has ischemic cardiomyopathy with LV ejection fraction 26% in the past.  He seems to be doing better however is not sure what medication he is taking.  He was advised to continue Entresto and Coreg.  He will call back with his medications.  He still is not interested in device therapy.  Healthy lifestyle emphasized  follow-up scheduled        No follow-ups on file.

## 2020-03-05 NOTE — PROGRESS NOTES
Katie Zuñiga is a 69 y.o. male.     History of Present Illness     Right Cerebral Infarcts  Hospitalized at [facility] nine months ago with acute watershed infarcts of the right hemisphere contributed to an 80% stenosis of the right ICA.  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  Echocardiogram was reported as showing borderline left ventricular dilation with inferior wall akinesis, moderate septal hypokinesis, an estimated ejection fraction of 40 to 50%, and right ventricular dilation.  No mural thrombus was noted.  He was ultimately discharged on his preadmission medications with the addition of clopidogrel and a change in xultophy to insulin detemir and insulin aspartame according to a sliding scale with meals.  He attended rehabilitation at [facility] afterward. He admits to persistent left-sided weakness along with gait instability and several falls.  He has decided against a neurosurgical reassessment or  stenting of the right ICA.      Congestive Heart Failure  Patient has a history of congestive heart failure previously complicated by an apical mural thrombus treated with coumadin. Patient remains short of breath with exertion,with occasional palpitations, lightheadedness, and fatigue. He denies dyspnea at rest, orthopnea, paroxysmal nocturnal dyspnea, or chest pain. Current medications include entresto, carvedilol,  furosemide.  He states he is compliant most of the time with his medications. He states he is noncompliant most of the time with his diet.  He underwent a cardiology reassessment with Dr Marcus on 1/28/20 with no apparent changes made in his management    Coronary artery disease  He has a history of CABG and CHF. Previous diagnostic testing includes: cardiac catheterization Recent history: taking medications as instructed, no medication side effects noted, no TIA's, no chest pain on exertion, notes increased dyspnea on exertion, no change and no  swelling of ankles. Medication side effects include: none.     Diabetes  Current symptoms include none. Patient denies paresthesia of the feet, visual disturbances, polydipsia, polyuria, hypoglycemia and foot ulcerations. Evaluation to date has been: hemoglobin A1C. Home sugars: BGs are running  consistent with Hgb A1C. Current treatments: insulin detemir 45 daily, insulin aspartame 3 times daily with meals according to sliding scale.  He has had no recent labs     Dyslipidemia  Compliance with treatment has been poor. The patient exercises rarely. He is currently being prescribed the following medication for his dyslipidemia - rosuvastatin.    Chronic Renal Failure  Patient returns with a history of chronic renal disease caused by diabetic nephropathy and hypertensive nephrosclerosis. Treatments have been prescribed for slowing the progression of CRF include avoid NSAIDs.The patient has experienced  constipation, dyspnea, palpitations, fatigue and weakness.The patient has not experienced any nausea, anorexia, pruritis, PND, orthopnea, edema, chest pain, cognitive impairment, dysuria and hematuria. Patient is not followed by nephrology.    COPD  He admits to persistent cough associated with shortness of breath on exertion and intermittent wheezing. He states that these symptoms occur at any time during the day or night. He reports that his symptoms become worse with activity. His symptoms are reduced with rest and with inhaled inhaled medication. The patient states he also has nasal congestion. He is following the treatment plan, including medications as directed. Updated CT of the chest performed on 2/26/18 was reported as showing emphysematous changes as well as a 4 mm CHENCHO nodule.  Chest x-ray performed on 6/6/2019 was reported as showing cardiomegaly, small bilateral pleural effusions, bibasilar atelectasis, and prominent interstitial markings.    Chronic Low Back Pain  He complains of chronic lumbar back pain.   Pain is not related to a specific injury.  Symptoms include back pain,stiffness, and decreased ROJM.  He denies urinary incontinence or fecal incontinence.  Pain is located int he low back.  Radiation to left leg. Pain is described as varying from mild to severe burning and throbbing. Onset x years.  Symptoms exacerbated by standing and walking.  Symptoms alleviated by rest and opioid analgesics.  Associated symptoms include sexual dysfunction.  Pain causes functional limitation including general activity, mood, walking ability, work, and activities of daily living. Previous MRI lumbar spine was reported as showing disc bulging L4/5 and L5/S1 with central protrusion of disc and facet arthropathy involving L3-S1.  He continues to be followed by pain management.      The following portions of the patient's history were reviewed and updated as appropriate: allergies, current medications, past medical history, past social history and problem list.    Review of Systems   Constitutional: Positive for appetite change (increased) and fatigue. Negative for chills and fever.   HENT: Positive for rhinorrhea and sneezing. Negative for congestion, ear pain, postnasal drip, sinus pressure, sore throat and voice change.    Respiratory: Positive for cough, shortness of breath and wheezing.    Cardiovascular: Positive for palpitations. Negative for chest pain and leg swelling.   Gastrointestinal: Positive for constipation. Negative for abdominal pain, blood in stool, diarrhea, nausea and vomiting.   Genitourinary: Negative for difficulty urinating, dysuria, frequency, hematuria and urgency.   Musculoskeletal: Positive for arthralgias and back pain. Negative for joint swelling, myalgias and neck pain.   Skin: Positive for wound (right hand from his most recent fall - fell back while putting his coat on).   Neurological: Positive for dizziness, weakness (generalized) and headaches. Negative for tremors and numbness.    Psychiatric/Behavioral: Positive for sleep disturbance. Negative for dysphoric mood. The patient is not nervous/anxious.      Objective   Physical Exam   Constitutional: He is oriented to person, place, and time. He appears well-developed and well-nourished. He is cooperative. He does not have a sickly appearance. No distress.   Accompanied by his wife. Alert and in fair spirits. Appeared older than stated age, chronically ill, and slightly pale. Ambulating with a cane. Able to climb onto the exam table.  No apparent distress.  No cyanosis, diaphoresis, or jaundice.   HENT:   Head: Atraumatic.   Right Ear: Tympanic membrane, external ear and ear canal normal.   Left Ear: Tympanic membrane, external ear and ear canal normal.   Mouth/Throat: Oropharynx is clear and moist.   Eyes: Pupils are equal, round, and reactive to light. EOM are normal. No scleral icterus.   Neck: No JVD present. Carotid bruit is not present. No tracheal deviation present. No thyromegaly present.   Cardiovascular: Normal rate, regular rhythm, S1 normal, S2 normal, normal heart sounds and intact distal pulses. Exam reveals no gallop.   No murmur heard.  Pulmonary/Chest: No accessory muscle usage. No respiratory distress. He has decreased breath sounds in the right lower field and the left lower field. He has wheezes (diffuse-primarily with forced expiration). He has no rales.   Mild pulmonary hyperinflation   Musculoskeletal: He exhibits no deformity.   No peripheral joint redness or warmth   Lymphadenopathy:        Head (right side): No submandibular adenopathy present.        Head (left side): No submandibular adenopathy present.     He has no cervical adenopathy.   Neurological: He is alert and oriented to person, place, and time. He displays no tremor. A sensory deficit (decreased vibration sense both feet) is present. No cranial nerve deficit. He exhibits abnormal muscle tone (mild - left upper extremity). Coordination abnormal.   Mild  weakness left upper extremity   Skin: Skin is warm and dry. Abrasion (clean healing abrasions dorsum fingers right hand) noted. No rash noted. He is not diaphoretic. No pallor. Nails show no clubbing.   Psychiatric: He has a normal mood and affect. His behavior is normal.     Assessment/Plan   Problems Addressed this Visit        Cardiovascular and Mediastinum    Essential hypertension  Hypertension: at goal. Evidence of target organ damage: coronary artery disease, heart failure, chronic kidney disease, stroke and right ICA stenosis.  Encouraged to continue to work on diet and exercise plan.   Continue current medication  Updated labs drawn.    Mixed hyperlipidemia  As above.   Continue current medication.    CAD (coronary artery disease)  Reminded regarding risk factor modification with an emphasis on tobacco cessation.  Continue current medication  Follow up with cardiology     Ischemic cardiomyopathy, LV ejection fraction 26%  As above.   Continue current medication.    PAD (peripheral artery disease) (CMS/HCC)   As above.   Continue current medication.       Respiratory    COPD mixed type (CMS/HCC)   COPD is stable.  Reminded of the importance of smoking cessation  Encouraged to remain as active as symptoms allow for  Continue current medication    Chronic seasonal allergic rhinitis       Digestive    Vitamin D deficiency    Gastroesophageal reflux disease without esophagitis    Diverticulosis of large intestine without hemorrhage       Endocrine    Type 2 diabetes mellitus with peripheral neuropathy (CMS/HCC)  Diabetes mellitus Type II, under unknown control.   Encouraged to continue to pursue ADA diet  Encouraged aerobic exercise.  Continue current medication  Previously scheduled labs drawn       Nervous and Auditory    Mechanical back pain    Cerebral infarction due to occlusion of right internal carotid artery (CMS/HCC)  As above.   Continue current medication.       Genitourinary    Stage 3 chronic kidney  disease (CMS/[facility])  Reminded to avoid any NSAIDs prescription or OTC  Will continue to monitor       Hematopoietic and Hemostatic    Iron deficiency anemia due to chronic blood loss       Other    Chronic anxiety    Smoker    Healthcare maintenance  Recommended an updated Tdap    Relevant Orders    Tdap Vaccine Greater Than or Equal To 8yo IM (Completed)    Abrasion  As above.    Relevant Orders    Tdap Vaccine Greater Than or Equal To 8yo IM (Completed)

## 2020-03-24 NOTE — TELEPHONE ENCOUNTER
----- Message from Edy Kline MD sent at 3/17/2020  3:41 PM EDT -----  If he is willing we need to get him an appt with Dr Helms  Let me know what he says    ----- Message -----  From: Yari Rascon MA  Sent: 3/17/2020   3:34 PM EDT  To: Edy Kline MD    Patients wife called stating that patient has had blood in his urine for the last week or so and it has got worse over time. What would you recommend him to do

## 2020-04-28 NOTE — PROGRESS NOTES
subjective     Chief Complaint   Patient presents with   • Hypertension     pt denies any symptoms   • Coronary Artery Disease   • Hyperlipidemia     History of Present Illness  You have chosen to receive care through a telephone visit. Do you consent to use a telephone visit for your medical care today? Yes    Patient is 69 years old white male who is being evaluated via telephone visit .  Patient states that he is doing well from cardiac standpoint, he complains of being weak tired and fatigued.  He states that he cannot hardly walk in the house because of legs giving out and aching and hurting in the legs.  He has known coronary artery disease prior myocardial infarction and stroke in 2014 at that time was found to have LV apical thrombus  Patient declined device therapy.  Patient states that he is not a candidate for surgical intervention or transcutaneous intervention.  Patient has severe LV dysfunction.  He states that he is doing fairly well from cardiac standpoint he denies any chest pain, he still is short of breath but is better with the Coreg Entresto and Lasix .  He is on dual antiplatelet therapy  Patient has hyperlipidemia and is taking Crestor.  Last lab work showed total cholesterol 121    He is diabetic but his last sugar on March 5, 2020 was 553    Past Surgical History:   Procedure Laterality Date   • CARDIAC SURGERY      Open heart      Family History   Problem Relation Age of Onset   • Vision loss Other    • Coronary artery disease Other    • Diabetes Mother    • Arthritis Mother    • Heart attack Father    • Heart disease Sister    • Seizures Sister    • Heart disease Brother      Past Medical History:   Diagnosis Date   • Allergic rhinitis    • Anxiety    • CAD (coronary artery disease)    • CHF (congestive heart failure) (CMS/HCC)    • COPD (chronic obstructive pulmonary disease) (CMS/HCC)    • Diabetes mellitus (CMS/HCC)     TYPE ll   • Diverticulosis    • Erectile dysfunction    • Gastritis     • GERD (gastroesophageal reflux disease)    • Head injury 2/1/2018   • Hx of long-term (current) use of anticoagulants    • Hyperlipidemia    • Hypertension    • Iron deficiency    • Mechanical back pain    • Non-small cell carcinoma of lung (CMS/HCC)    • Smoker 8/8/2016   • Stroke (CMS/HCC) 06/06/2019    left   • Vitamin D deficiency      Patient Active Problem List   Diagnosis   • Essential hypertension   • Mixed hyperlipidemia   • CAD (coronary artery disease)   • Chronic systolic congestive heart failure (CMS/[facility])   • COPD mixed type (CMS/[facility])   • Type 2 diabetes mellitus with peripheral neuropathy (CMS/[facility])   • Vitamin D deficiency   • Mechanical back pain   • Gastroesophageal reflux disease without esophagitis   • Chronic seasonal allergic rhinitis   • Chronic anxiety   • Diverticulosis of large intestine without hemorrhage   • Vasculogenic erectile dysfunction   • Smoker   • Healthcare maintenance   • Iron deficiency anemia due to chronic blood loss   • Ischemic cardiomyopathy, LV ejection fraction 26%   • Bulging lumbar disc   • Stage 3 chronic kidney disease (CMS/[facility])   • Abrasion   • Cerebral infarction due to occlusion of right internal carotid artery (CMS/[facility])   • PAD (peripheral artery disease) (CMS/[facility])       Social History     Tobacco Use   • Smoking status: Former Smoker     Years: 50.00     Types: Cigarettes   • Smokeless tobacco: Never Used   Substance Use Topics   • Alcohol use: No   • Drug use: No       No Known Allergies    Current Outpatient Medications on File Prior to Visit   Medication Sig   • albuterol sulfate  (90 Base) MCG/ACT inhaler Inhale 2 puffs Every 6 (Six) Hours As Needed for Wheezing.   • aspirin 81 MG chewable tablet Chew 1 tablet daily.   • carvedilol (COREG) 12.5 MG tablet Take 1 tablet by mouth 2 (Two) Times a Day With Meals.   • clopidogrel (PLAVIX) 75 MG tablet Take 1 tablet by mouth Daily.   • DULoxetine (CYMBALTA) 60 MG capsule TAKE 1 CAPSULE BY MOUTH ONCE DAILY  "  • EASY COMFORT LANCETS misc USE TO TEST BLOOD SUGAR FOUR TIMES DAILY AS DIRECTED   • esomeprazole (nexIUM) 40 MG capsule Take 1 capsule by mouth Daily.   • furosemide (LASIX) 40 MG tablet Take 1 tablet by mouth Every Morning.   • gabapentin (NEURONTIN) 300 MG capsule    • Glucose Blood (BLOOD GLUCOSE TEST) strip 1 four times daily   • HYDROcodone-acetaminophen (NORCO)  MG per tablet    • insulin aspart (novoLOG FLEXPEN) 100 UNIT/ML solution pen-injector sc pen Tid with meals as per sliding scale   • Insulin Pen Needle 32G X 4 MM misc 1 each 4 (Four) Times a Day.   • Insulin Syringe 28G X 1/2\" 0.5 ML misc 2 (two) times a day.   • LEVEMIR FLEXTOUCH 100 UNIT/ML injection INJECT 45 UNITS SUBQ ONCE DAILY AS DIRECTED   • nitroglycerin (NITROSTAT) 0.4 MG SL tablet Place 1 tablet under the tongue Every 5 (Five) Minutes As Needed for Chest Pain. Take no more than 3 doses in 15 minutes.   • rosuvastatin (CRESTOR) 20 MG tablet TAKE 1 Tablet BY MOUTH EVERY DAY   • sacubitril-valsartan (ENTRESTO) 24-26 MG tablet Take 1 tablet by mouth 2 (Two) Times a Day.   • Zoster Vac Recomb Adjuvanted (SHINGRIX) 50 MCG/0.5ML reconstituted suspension Inject 50 mcg into the appropriate muscle as directed by prescriber See Admin Instructions. Repeat in 2-6 months     No current facility-administered medications on file prior to visit.          The following portions of the patient's history were reviewed and updated as appropriate: allergies, current medications, past family history, past medical history, past social history, past surgical history and problem list.    Review of Systems   Constitution: Positive for malaise/fatigue.   HENT: Negative.  Negative for congestion.    Eyes: Negative.    Cardiovascular: Positive for dyspnea on exertion. Negative for chest pain, cyanosis, irregular heartbeat, leg swelling, near-syncope, orthopnea, palpitations, paroxysmal nocturnal dyspnea and syncope.   Respiratory: Positive for shortness of " breath.    Hematologic/Lymphatic: Negative.    Musculoskeletal: Positive for myalgias and stiffness.        Leg pain and weakness   Gastrointestinal: Negative.    Neurological: Negative.  Negative for difficulty with concentration, disturbances in coordination, dizziness and headaches.          Objective:     There were no vitals taken for this visit.  Physical Exam   Constitutional:   Not done         Lab Review  Lab Results   Component Value Date     (L) 03/05/2020    K 4.9 03/05/2020    CL 91 (L) 03/05/2020    BUN 26 (H) 03/05/2020    CREATININE 1.47 (H) 03/05/2020    GLUCOSE 219 (H) 04/11/2017     (C) 03/05/2020    CALCIUM 9.8 03/05/2020    ALT 25 03/05/2020    ALKPHOS 78 03/05/2020    LABIL2 1.8 03/05/2020     No results found for: CKTOTAL  Lab Results   Component Value Date    WBC 10.26 03/05/2020    HGB 12.5 (L) 03/05/2020    HCT 41.8 03/05/2020     03/05/2020     Lab Results   Component Value Date    INR 1.06 12/12/2018    INR 1.89 (H) 05/11/2018    INR 2.04 (H) 02/09/2017     No results found for: MG  Lab Results   Component Value Date    TSH 1.580 03/05/2020     No results found for: BNP  Lab Results   Component Value Date    CHLPL 121 03/05/2020    CHOL 305 (H) 04/11/2017    TRIG 284 (H) 03/05/2020    HDL 33 (L) 03/05/2020    VLDL 56.8 03/05/2020    LDLHDL  04/11/2017      Comment:      Unable to calculate     Lab Results   Component Value Date    LDL 31 03/05/2020    LDL 27 08/01/2019       Procedures       I personally viewed and interpreted the patient's LAB data         Assessment:     1. Ischemic cardiomyopathy, LV ejection fraction 26%    2. Mixed hyperlipidemia    3. Essential hypertension    4. Chronic systolic congestive heart failure (CMS/HCC)    5. Coronary artery disease involving native coronary artery of native heart without angina pectoris          Plan:     Patient seems to be doing fair from cardiac standpoint he has no new complaints.  He denies any chest pain  palpitations or shortness of breath.  His only complaint is his legs hurting and weakness.  No change in therapy was made.  From cardiac standpoint he is on appropriate medications    His sugar is poorly controlled last lab work in March shows sugar of 553.  Patient states that is better since then is running between 200-300.  Patient was advised to follow diet and follow closely with Dr. Kline  Cardiology follow-up scheduled.  Coronavirus precautions again discussed.  This visit has been rescheduled as a phone visit to comply with patient safety concerns in accordance with CDC recommendations. Total time of discussion was 15 minutes.      No follow-ups on file.

## 2020-05-11 PROBLEM — R31.0 GROSS HEMATURIA: Status: ACTIVE | Noted: 2020-01-01

## 2020-05-11 NOTE — PROGRESS NOTES
Katie Zuñiga is a 69 y.o. male.     History of Present Illness     This visit has been rescheduled as a phone visit to comply with patient safety concerns in accordance with CDC recommendations. Total time of discussion was 16 minutes.    You have chosen to receive care through a telephone visit. Do you consent to use a telephone visit for your medical care today? Yes    Gross Hematuria  He gives an approximate 2 month history of intermittent gross hematuria.  He admits to occasional penile discomfort with this.  He has a long history of daytime frequency and 4-5 episodes of nocturia on average.  He denies any dysuria, urgency, or sense of incomplete voiding.  He admits to increased fatigue and generalized weakness. He denies any bleeding elsewhere.    Right Cerebral Infarcts  Hospitalized at [facility] nearly 1 year ago with acute watershed infarcts of the right hemisphere contributed to an 80% stenosis of the right ICA.  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  Echocardiogram was reported as showing borderline left ventricular dilation with inferior wall akinesis, moderate septal hypokinesis, an estimated ejection fraction of 40 to 50%, and right ventricular dilation.  No mural thrombus was noted.  He was ultimately discharged on his preadmission medications with the addition of clopidogrel and a change in xultophy to insulin detemir and insulin aspartame according to a sliding scale with meals.  He attended rehabilitation at [facility] afterward. He admits to persistent left-sided weakness along with gait instability.  He has not had any recent falls but has not been particularly active.  He has decided against a neurosurgical reassessment or  stenting of the right ICA.      Congestive Heart Failure  Patient has a history of congestive heart failure previously complicated by an apical mural thrombus treated with coumadin. Patient admits to increased short of breath  with exertion,with occasional palpitations, lightheadedness, and fatigue. He denies dyspnea at rest, orthopnea, paroxysmal nocturnal dyspnea, or chest pain. Current medications include entresto, carvedilol,  furosemide.  He states he is compliant most of the time with his medications. He states he is noncompliant most of the time with his diet.  He underwent a cardiology reassessment with Dr Marcus on 4/28/2020 with no apparent changes made in his management    Coronary artery disease  He has a history of CABG and CHF. Previous diagnostic testing includes: cardiac catheterization Recent history: taking medications as instructed, no medication side effects noted, no TIA's, no chest pain on exertion, notes increased dyspnea on exertion, no change and no swelling of ankles. Medication side effects include: none.     Diabetes  Current symptoms include none. Patient denies paresthesia of the feet, visual disturbances, polydipsia, polyuria, hypoglycemia and foot ulcerations. Evaluation to date has been: hemoglobin A1C. Home sugars: BGs are running  consistent with Hgb A1C. Current treatments: insulin detemir 45 daily, insulin aspartame 3 times daily with meals according to sliding scale.  He was scheduled an appointment with MARIBEL Wheeler CDE but was unable to follow-up with this  Lab Results   Component Value Date    HGBA1C 13.90 (H) 03/05/2020      Dyslipidemia  Compliance with treatment has been poor. The patient exercises rarely. He is currently being prescribed the following medication for his dyslipidemia - rosuvastatin.  Lab Results   Component Value Date    CHOL 305 (H) 04/11/2017    CHLPL 121 03/05/2020    TRIG 284 (H) 03/05/2020    HDL 33 (L) 03/05/2020    LDL 31 03/05/2020     Chronic Renal Failure  Patient returns with a history of chronic renal disease caused by diabetic nephropathy and hypertensive nephrosclerosis. Treatments have been prescribed for slowing the progression of CRF include avoid  NSAIDs.The patient has experienced  constipation, dyspnea, palpitations, fatigue and weakness.The patient has not experienced any nausea, anorexia, pruritis, PND, orthopnea, edema, chest pain, cognitive impairment, dysuria and hematuria. Patient is not followed by nephrology.  Lab Results   Component Value Date    GLUCOSE 219 (H) 04/11/2017    BUN 26 (H) 03/05/2020    CREATININE 1.47 (H) 03/05/2020    EGFRIFNONA 48 (L) 03/05/2020    EGFRIFAFRI 58 (L) 03/05/2020    BCR 17.7 03/05/2020    K 4.9 03/05/2020    CO2 22.6 03/05/2020    CALCIUM 9.8 03/05/2020    PROTENTOTREF 6.9 03/05/2020    ALBUMIN 4.40 03/05/2020    LABIL2 1.8 03/05/2020    AST 10 03/05/2020    ALT 25 03/05/2020     COPD  He admits to persistent cough associated with shortness of breath on exertion and intermittent wheezing. He states that these symptoms occur at any time during the day or night. He reports that his symptoms become worse with activity. His symptoms are reduced with rest and with inhaled inhaled medication. The patient states he also has nasal congestion. He is following the treatment plan, including medications as directed. Updated CT of the chest performed on 2/26/18 was reported as showing emphysematous changes as well as a 4 mm CHENCHO nodule.  He has been unable to follow-up with updated studies arranged at his last several appointments      Labs  Lab Results   Component Value Date    WBC 10.26 03/05/2020    HGB 12.5 (L) 03/05/2020    HCT 41.8 03/05/2020    MCV 77.3 (L) 03/05/2020     03/05/2020     Lab Results   Component Value Date    IRON 43 (L) 04/11/2017    TIBC 544 04/09/2019    FERRITIN 17.00 (L) 03/05/2020     Lab Results   Component Value Date    [id number] 737 03/05/2020     The following portions of the patient's history were reviewed and updated as appropriate: allergies, current medications, past medical history, past social history and problem list.    Review of Systems   Constitutional: Positive for fatigue.  Negative for appetite change, chills and fever.   HENT: Positive for rhinorrhea and sneezing. Negative for congestion, ear pain, postnasal drip, sinus pressure, sore throat and voice change.    Respiratory: Positive for cough, shortness of breath and wheezing.    Cardiovascular: Positive for palpitations. Negative for chest pain and leg swelling.   Gastrointestinal: Positive for constipation. Negative for abdominal pain, blood in stool, diarrhea, nausea and vomiting.   Genitourinary: Positive for frequency and hematuria. Negative for difficulty urinating, dysuria and urgency.        Nocturia x 4-5   Musculoskeletal: Positive for arthralgias and back pain. Negative for joint swelling, myalgias and neck pain.   Skin: Negative for rash.   Neurological: Positive for dizziness, weakness (generalized) and headaches. Negative for tremors and numbness.   Psychiatric/Behavioral: Positive for sleep disturbance. Negative for dysphoric mood. The patient is not nervous/anxious.      Objective   Physical Exam   Constitutional:   Awake and oriented but tired.  No apparent distress or shortness of breath     Assessment/Plan   Problems Addressed this Visit        Cardiovascular and Mediastinum    CAD (coronary artery disease)  Reminded regarding risk factor modification with an emphasis on tobacco cessation.  Continue current medication  Follow up with cardiology     Essential hypertension  Continue current medication    Ischemic cardiomyopathy, LV ejection fraction 26%  Congestive heart failure due to coronary artery disease (CAD) and systolic dysfunction.  Heart failure is stable.  Reminded regarding the importance of lifestyle modification.  Continue current medication  Follow up with cardiology     Mixed hyperlipidemia  As above.   Continue current medication.    PAD (peripheral artery disease) (CMS/[facility])   As above.   Continue current medication.       Respiratory    Chronic seasonal allergic rhinitis    COPD mixed type  (CMS/HCC)   COPD is worsening.  Reminded of the importance of smoking cessation  Continue current medication       Digestive    Diverticulosis of large intestine without hemorrhage    Gastroesophageal reflux disease without esophagitis    Vitamin D deficiency       Endocrine    Type 2 diabetes mellitus with peripheral neuropathy (CMS/HCC)  Diabetes mellitus Type II, under poor control.   Continue current medication for now       Nervous and Auditory    Cerebral infarction due to occlusion of right internal carotid artery (CMS/HCC)  As above.   Continue current medication.       Genitourinary    Gross hematuria  Given his chronic multisystem disease recommended immediate ER evaluation.  Reviewed other options including outpatient labs, CT of the kidneys, and urology assessment as soon as possible.  Patient's wife is going to be in [place] tomorrow and they would like to wait until she returns to make a decision.  He was encouraged to call 911 in the meantime if any worse or if any new symptoms whatsoever    Stage 3 chronic kidney disease (CMS/HCC)       Hematopoietic and Hemostatic    Iron deficiency anemia due to chronic blood loss  Chronic.  Recurrent.  Has required a number of blood transfusions in the past       Other    Chronic anxiety        Smoker

## 2020-05-13 NOTE — TELEPHONE ENCOUNTER
----- Message from Edy Kline MD sent at 5/13/2020  2:15 PM EDT -----  edgard vo    ----- Message -----  From: Jessica Barnes MA  Sent: 5/13/2020   2:06 PM EDT  To: Edy Kline MD    [facility] tried to get a hold of him a couple of times but have not been able to reach him. Tesha said she was going to call the ambulance this morning and have them take him to the ER.   ----- Message -----  From: Edy Kline MD  Sent: 5/13/2020  10:05 AM EDT  To: Jessica Barnes MA    Please check with his wife if this is still the plan  If so please see if Dr James would be willing to direct admit him  Dx - gross hematuria, anemia, CHF, CAD, one year post watershed stroke, COPD  If he needs to talk to me he can call me on his cell    ----- Message -----  From: Jessica Barnes MA  Sent: 5/12/2020   8:44 AM EDT  To: Edy Kline MD    Patient' wife left a voicemail and stated that gagandeep would be at the hospital on Wednesday.

## 2020-06-05 PROBLEM — T14.8XXA ABRASION: Status: RESOLVED | Noted: 2019-06-10 | Resolved: 2020-01-01

## 2020-06-05 NOTE — PROGRESS NOTES
Katie Zuñiga is a 69 y.o. male.     History of Present Illness     This visit has been rescheduled as a phone visit to comply with patient safety concerns in accordance with CDC recommendations. Total time of discussion was 13 minutes.    You have chosen to receive care through a telephone visit. Do you consent to use a telephone visit for your medical care today? Yes    Gross Hematuria  He continues to have intermittent gross hematuria.  He admits to occasional penile discomfort and has a long history of daytime frequency and nocturia x 4-5.  He denies any dysuria, urgency, or sense of incomplete voiding.  He admits to increased fatigue and generalized weakness. He denies any bleeding elsewhere.  He underwent an evaluation at [facility] on 5/13/2020.  Hemoglobin returned at 13.9 with an MCV of 77.5.  Serum creatinine returned at 1.57 with an ALP of 66.  Noncontrast CT of the abdomen/pelvis was reported as showing thickening of the urinary bladder, mild constipation, and calcific plaques of the aorta.  He is scheduled to undergo a urology assessment with Dr. Helms on 6/22/2020    Right Cerebral Infarcts  Hospitalized at [facility] 1 year ago with acute watershed infarcts of the right hemisphere contributed to an 80% stenosis of the right ICA.  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  Echocardiogram was reported as showing borderline left ventricular dilation with inferior wall akinesis, moderate septal hypokinesis, an estimated ejection fraction of 40 to 50%, and right ventricular dilation.  No mural thrombus was noted.  He was ultimately discharged on his preadmission medications with the addition of clopidogrel and a change in xultophy to insulin detemir and insulin aspartame according to a sliding scale with meals.  He attended rehabilitation at [facility] afterward. He admits to persistent left-sided weakness along with gait instability.  He has not had any recent  falls but has not been particularly active.  He has decided against a neurosurgical reassessment or  stenting of the right ICA.      Congestive Heart Failure  Patient has a history of congestive heart failure previously complicated by an apical mural thrombus treated with coumadin. Patient admits to increased short of breath with exertion,with occasional palpitations, lightheadedness, and fatigue. He denies dyspnea at rest, orthopnea, paroxysmal nocturnal dyspnea, or chest pain. Current medications include entresto, carvedilol,  furosemide.  He states he is compliant most of the time with his medications. He states he is noncompliant most of the time with his diet.  He continues to be followed by cardiology and will undergo a reassessment with Dr Marcus on 7/28/2020    Coronary artery disease  He has a history of CABG and CHF. Previous diagnostic testing includes: cardiac catheterization Recent history: taking medications as instructed, no medication side effects noted, no TIA's, no chest pain on exertion, notes increased dyspnea on exertion, no change and no swelling of ankles. Medication side effects include: none.     Diabetes  Current symptoms include none. Patient denies paresthesia of the feet, visual disturbances, polydipsia, polyuria, hypoglycemia and foot ulcerations. Evaluation to date has been: hemoglobin A1C. Home sugars: he is only checking his glucose once or twice a week and it is generally in the 200s. Current treatments: insulin detemir 45 daily.  He was scheduled an appointment with MARIBEL Wheeler CDE but was unable to follow-up with this     Dyslipidemia  Compliance with treatment has been poor. The patient exercises rarely. He is currently being prescribed the following medication for his dyslipidemia - rosuvastatin.    Chronic Renal Failure  Patient returns with a history of chronic renal disease caused by diabetic nephropathy and hypertensive nephrosclerosis. Treatments have been  prescribed for slowing the progression of CRF include avoid NSAIDs.The patient has experienced  constipation, dyspnea, palpitations, fatigue and weakness.The patient has not experienced any nausea, anorexia, pruritis, PND, orthopnea, edema, chest pain, cognitive impairment, dysuria and hematuria. Patient is not followed by nephrology.    COPD  He admits to persistent cough associated with shortness of breath on exertion and intermittent wheezing. He states that these symptoms occur at any time during the day or night. He reports that his symptoms become worse with activity. His symptoms are reduced with rest and with inhaled inhaled medication. The patient states he also has nasal congestion. He is following the treatment plan, including medications as directed. Updated CT of the chest performed on 2/26/18 was reported as showing emphysematous changes as well as a 4 mm CHENCHO nodule.  He has been unable to follow-up with updated studies arranged at his last several appointments    The following portions of the patient's history were reviewed and updated as appropriate: allergies, current medications, past medical history, past social history and problem list.    Review of Systems   Constitutional: Positive for fatigue. Negative for appetite change, chills and fever.   HENT: Positive for rhinorrhea and sneezing. Negative for congestion, ear pain, postnasal drip, sinus pressure, sore throat and voice change.    Respiratory: Positive for cough, shortness of breath and wheezing.    Cardiovascular: Positive for palpitations. Negative for chest pain and leg swelling.   Gastrointestinal: Positive for constipation. Negative for abdominal pain, blood in stool, diarrhea, nausea and vomiting.   Genitourinary: Positive for frequency and hematuria. Negative for difficulty urinating, dysuria and urgency.        Nocturia x 4-5   Musculoskeletal: Positive for arthralgias and back pain. Negative for joint swelling, myalgias and neck  pain.   Skin: Negative for rash.   Neurological: Positive for dizziness, weakness (generalized) and headaches. Negative for tremors and numbness.   Psychiatric/Behavioral: Positive for sleep disturbance. Negative for dysphoric mood. The patient is not nervous/anxious.      Objective   Physical Exam   Constitutional:   Alert and oriented.  Bright and in fair spirits.  No apparent distress or shortness of breath     Assessment/Plan   Problems Addressed this Visit        Cardiovascular and Mediastinum    CAD (coronary artery disease)  Reminded regarding risk factor modification with an emphasis on tobacco cessation.  Continue current medication  Follow up with cardiology     Essential hypertension    Ischemic cardiomyopathy, LV ejection fraction 26%  As above.    Mixed hyperlipidemia    PAD (peripheral artery disease) (CMS/HCC)   As above.       Respiratory    Chronic seasonal allergic rhinitis    COPD mixed type (CMS/HCC)   COPD is stable.  Reminded of the importance of smoking cessation  Continue current medication       Digestive    Diverticulosis of large intestine without hemorrhage    Gastroesophageal reflux disease without esophagitis    Vitamin D deficiency       Endocrine    Type 2 diabetes mellitus with peripheral neuropathy (CMS/HCC)  Diabetes mellitus Type II, under poor control.   Encouraged to continue to pursue ADA diet  Encouraged aerobic exercise.  Continue current medication for now  We will discuss an assessment with MARIBEL Blake CDE at his return       Nervous and Auditory    Cerebral infarction due to occlusion of right internal carotid artery (CMS/HCC)  As above.    Mechanical back pain  Follow up with pain management       Genitourinary    Gross hematuria  With bladder wall thickening on recent CT in a long term smoker  Strongly encouraged to follow-up with urology    Stage 3 chronic kidney disease (CMS/HCC)       Hematopoietic and Hemostatic    Iron deficiency anemia due to chronic  blood loss       Other    Chronic anxiety    Healthcare maintenance  We will discuss a low-dose CT of the chest again at his return    Smoker    Vasculogenic erectile dysfunction

## 2020-06-22 NOTE — PROGRESS NOTES
Chief Complaint:          Chief Complaint   Patient presents with   • Blood in Urine     New pt       HPI:   69 y.o. male referred for evaluation of hematuria and bladder wall thickening.  He has been on aspirin.  He was seen by Dr. Cortez he systems extreme positive smoker with 50 pack years and continues to smoke multiple problems coronary artery disease, strokes, COPD, diabetes, CT showed obvious bladder tumors this is bladder cancer appears to be invasive is CT was reviewed dated 5/13/2020.  Hematuria-patient was diagnosed with hematuria.  We discussed the significance of microscopic hematuria versus gross hematuria.  We discussed the presence or absence of the type of clotting identified including vermiform clots consistent with ureteral bleeding versus just pink tinged urine versus kia clots.  We discussed the presence of urokinase in the urine which causes the clots to dissolve with time.  We discussed the fact that it takes only a very small amount of blood in the urine to make the urine very red appearing and therefore give one the impression that there is much more blood loss that is really present.  He discussed the use of both an upper and lower tract investigation.  I discussed the fact that an upper tract investigation includes a normal renal ultrasound with a significant risks of missing more subtle lesions.  Progressing to a CT scan without contrast and finally the CT scan with contrast being the gold standard to diagnose the small neoplasms.  We discussed the lower tract investigation consisting of a cystoscopy in many of the cases where the upper tract study is negative.  Also discussed the fact that if there is a contraindication to the use of contrast we would do a noncontrasted study and this also has a chance of missing small lesions.  The specific instance would be cases of diabetes and chronic renal insufficiency.  Discussed the fact that there is about a 96% chance of a negative workup  with episodes of microscopic hematuria and with much greater in the face of gross hematuria.  We discussed the fact that this is a non-cumulative test.  In other words if there is hematuria next year I would recommend continuing to work up the condition because of the fact that neoplasms may be small at the first workup and easily are missed.  I discussed the differential diagnosis of hematuria including trauma, neoplasia, infection, etc.  We discussed the fact that if there is any history of chronic kidney disease or risk factors such as diabetes for contrast a noncontrasted study will be utilized.  We will initiate an investigation.      Past Medical History:        Past Medical History:   Diagnosis Date   • Allergic rhinitis    • Anxiety    • CAD (coronary artery disease)    • CHF (congestive heart failure) (CMS/[facility])    • COPD (chronic obstructive pulmonary disease) (CMS/[facility])    • Diabetes mellitus (CMS/[facility])     TYPE ll   • Diverticulosis    • Erectile dysfunction    • Gastritis    • GERD (gastroesophageal reflux disease)    • Head injury 2/1/2018   • Hx of long-term (current) use of anticoagulants    • Hyperlipidemia    • Hypertension    • Iron deficiency    • Mechanical back pain    • Non-small cell carcinoma of lung (CMS/[facility])    • Smoker 8/8/2016   • Stroke (CMS/[facility]) 06/06/2019    left   • Vitamin D deficiency          Current Meds:     Current Outpatient Medications   Medication Sig Dispense Refill   • aspirin 81 MG chewable tablet Chew 1 tablet daily. 30 tablet 5   • carvedilol (COREG) 12.5 MG tablet Take 1 tablet by mouth 2 (Two) Times a Day With Meals. (Patient taking differently: Take 6.25 mg by mouth 2 (Two) Times a Day With Meals.) 60 tablet 5   • clopidogrel (PLAVIX) 75 MG tablet Take 1 tablet by mouth Daily. 30 tablet 5   • DULoxetine (CYMBALTA) 60 MG capsule TAKE 1 CAPSULE BY MOUTH ONCE DAILY 30 capsule 5   • EASY COMFORT LANCETS misc USE TO TEST BLOOD SUGAR FOUR TIMES DAILY AS DIRECTED  2   •  "esomeprazole (nexIUM) 40 MG capsule TAKE 1 CAPSULE BY MOUTH ONCE DAILY 30 capsule 5   • gabapentin (NEURONTIN) 300 MG capsule Take 300 mg by mouth 3 (Three) Times a Day.     • Glucose Blood (BLOOD GLUCOSE TEST) strip 1 four times daily 120 each 5   • HYDROcodone-acetaminophen (NORCO)  MG per tablet Take  by mouth As Needed.     • insulin aspart (novoLOG FLEXPEN) 100 UNIT/ML solution pen-injector sc pen Tid with meals as per sliding scale 5 pen 5   • Insulin Pen Needle 32G X 4 MM misc 1 each 4 (Four) Times a Day. 120 each 5   • Insulin Syringe 28G X 1/2\" 0.5 ML misc 2 (two) times a day.     • LEVEMIR FLEXTOUCH 100 UNIT/ML injection INJECT 45 UNITS SUBQ ONCE DAILY AS DIRECTED (Patient taking differently: Inject  under the skin into the appropriate area as directed Every Night.) 12 mL 5   • nitroglycerin (NITROSTAT) 0.4 MG SL tablet Place 1 tablet under the tongue Every 5 (Five) Minutes As Needed for Chest Pain. Take no more than 3 doses in 15 minutes. 28 tablet 12   • rosuvastatin (CRESTOR) 20 MG tablet TAKE 1 Tablet BY MOUTH EVERY DAY 30 tablet 5   • sacubitril-valsartan (ENTRESTO) 24-26 MG tablet Take 1 tablet by mouth 2 (Two) Times a Day. 60 tablet 5   • Zoster Vac Recomb Adjuvanted (SHINGRIX) 50 MCG/0.5ML reconstituted suspension Inject 50 mcg into the appropriate muscle as directed by prescriber See Admin Instructions. Repeat in 2-6 months 1 each 1     No current facility-administered medications for this visit.         Allergies:      No Known Allergies     Past Surgical History:     Past Surgical History:   Procedure Laterality Date   • CARDIAC SURGERY      Open heart          Social History:     Social History     Socioeconomic History   • Marital status:      Spouse name: capo   • Number of children: 2   • Years of education: 8   • Highest education level: Not on file   Occupational History   • Occupation: retired   Social Needs   • Financial resource strain: Not hard at all   • Food " insecurity:     Worry: Never true     Inability: Never true   • Transportation needs:     Medical: No     Non-medical: No   Tobacco Use   • Smoking status: Former Smoker     Years: 50.00     Types: Cigarettes   • Smokeless tobacco: Never Used   Substance and Sexual Activity   • Alcohol use: No   • Drug use: No   • Sexual activity: Defer   Lifestyle   • Physical activity:     Days per week: 0 days     Minutes per session: 0 min   • Stress: Very much       Family History:     Family History   Problem Relation Age of Onset   • Vision loss Other    • Coronary artery disease Other    • Diabetes Mother    • Arthritis Mother    • Heart attack Father    • Heart disease Sister    • Seizures Sister    • Heart disease Brother        Review of Systems:     Review of Systems   Constitutional: Negative.    HENT: Negative.    Eyes: Negative.    Respiratory: Negative.    Cardiovascular: Negative.    Gastrointestinal: Negative.    Endocrine: Negative.    Musculoskeletal: Negative.    Allergic/Immunologic: Negative.    Neurological: Negative.    Hematological: Negative.    Psychiatric/Behavioral: Negative.        Physical Exam:     Physical Exam   Constitutional: He is oriented to person, place, and time. He appears well-developed and well-nourished.   HENT:   Head: Normocephalic and atraumatic.   Eyes: Pupils are equal, round, and reactive to light. Conjunctivae and EOM are normal.   Neck: Normal range of motion.   Cardiovascular: Normal rate, regular rhythm, normal heart sounds and intact distal pulses.   Pulmonary/Chest: Effort normal and breath sounds normal.   Abdominal: Soft. Bowel sounds are normal.   Genitourinary:   Genitourinary Comments: Very feeble   Musculoskeletal: Normal range of motion.   Neurological: He is alert and oriented to person, place, and time. He has normal reflexes.   Skin: Skin is warm and dry.   Psychiatric: He has a normal mood and affect. His behavior is normal. Judgment and thought content normal.    Nursing note and vitals reviewed.      I have reviewed the following portions of the patient's history: allergies, current medications, past family history, past medical history, past social history, past surgical history, problem list and ROS and confirm it's accurate.      Procedure:       Assessment/Plan:   Hematuria-patient was diagnosed with hematuria.  We discussed the significance of microscopic hematuria versus gross hematuria.  We discussed the presence or absence of the type of clotting identified including vermiform clots consistent with ureteral bleeding versus just pink tinged urine versus kia clots.  We discussed the presence of urokinase in the urine which causes the clots to dissolve with time.  We discussed the fact that it takes only a very small amount of blood in the urine to make the urine very red appearing and therefore give one the impression that there is much more blood loss that is really present.  He discussed the use of both an upper and lower tract investigation.  I discussed the fact that an upper tract investigation includes a normal renal ultrasound with a significant risks of missing more subtle lesions.  Progressing to a CT scan without contrast and finally the CT scan with contrast being the gold standard to diagnose the small neoplasms.  We discussed the lower tract investigation consisting of a cystoscopy in many of the cases where the upper tract study is negative.  Also discussed the fact that if there is a contraindication to the use of contrast we would do a noncontrasted study and this also has a chance of missing small lesions.  The specific instance would be cases of diabetes and chronic renal insufficiency.  Discussed the fact that there is about a 96% chance of a negative workup with episodes of microscopic hematuria and with much greater in the face of gross hematuria.  We discussed the fact that this is a non-cumulative test.  In other words if there is hematuria  next year I would recommend continuing to work up the condition because of the fact that neoplasms may be small at the first workup and easily are missed.  I discussed the differential diagnosis of hematuria including trauma, neoplasia, infection, etc.  We discussed the fact that if there is any history of chronic kidney disease or risk factors such as diabetes for contrast a noncontrasted study will be utilized.  We will initiate an investigation.  By review of his CT this is bladder cancer he needs an urgent cystoscopy            Patient's Body mass index is 23.11 kg/m². BMI is within normal parameters. No follow-up required..              This document has been electronically signed by OSKAR ANDREW MD June 22, 2020 15:21

## 2020-06-25 PROBLEM — D49.4 BLADDER TUMOR: Status: ACTIVE | Noted: 2020-01-01

## 2020-06-25 NOTE — PROGRESS NOTES
Chief Complaint:          Chief Complaint   Patient presents with   • Cystoscopy       HPI:   69 y.o. male  referred for evaluation of hematuria and bladder wall thickening.  He has been on aspirin.  He was seen by Dr. Cortez he systems extreme positive smoker with 50 pack years and continues to smoke multiple problems coronary artery disease, strokes, COPD, diabetes, CT showed obvious bladder tumors this is bladder cancer appears to be invasive is CT was reviewed dated 5/13/2020.  Hematuria-patient was diagnosed with hematuria.  We discussed the significance of microscopic hematuria versus gross hematuria.  We discussed the presence or absence of the type of clotting identified including vermiform clots consistent with ureteral bleeding versus just pink tinged urine versus kia clots.  We discussed the presence of urokinase in the urine which causes the clots to dissolve with time.  We discussed the fact that it takes only a very small amount of blood in the urine to make the urine very red appearing and therefore give one the impression that there is much more blood loss that is really present.  He discussed the use of both an upper and lower tract investigation.  I discussed the fact that an upper tract investigation includes a normal renal ultrasound with a significant risks of missing more subtle lesions.  Progressing to a CT scan without contrast and finally the CT scan with contrast being the gold standard to diagnose the small neoplasms.  We discussed the lower tract investigation consisting of a cystoscopy in many of the cases where the upper tract study is negative.  Also discussed the fact that if there is a contraindication to the use of contrast we would do a noncontrasted study and this also has a chance of missing small lesions.  The specific instance would be cases of diabetes and chronic renal insufficiency.  Discussed the fact that there is about a 96% chance of a negative workup with episodes  of microscopic hematuria and with much greater in the face of gross hematuria.  We discussed the fact that this is a non-cumulative test.  In other words if there is hematuria next year I would recommend continuing to work up the condition because of the fact that neoplasms may be small at the first workup and easily are missed.  I discussed the differential diagnosis of hematuria including trauma, neoplasia, infection, etc.  We discussed the fact that if there is any history of chronic kidney disease or risk factors such as diabetes for contrast a noncontrasted study will be utilized.  We will initiate an investigation.  He returns today my review of his upper tract investigation shows obvious bladder neoplasia he presents for cystoscopy he had obvious what appears to be noninvasive bladder tumors but apparently his heart is so bad that he is not allowed to have an anesthetic.  This puts us in a difficult situation I am to have him see Dr. Mcnair for the possibility of a clearance for an anesthetic with an ejection fraction less than 25%.  Without this this will continue to bleed cause significant problems with anemia and at some point may become metastatic and this is his best chance for cure.  The CT was done at [facility]      Past Medical History:        Past Medical History:   Diagnosis Date   • Allergic rhinitis    • Anxiety    • CAD (coronary artery disease)    • CHF (congestive heart failure) (CMS/HCC)    • COPD (chronic obstructive pulmonary disease) (CMS/HCC)    • Diabetes mellitus (CMS/HCC)     TYPE ll   • Diverticulosis    • Erectile dysfunction    • Gastritis    • GERD (gastroesophageal reflux disease)    • Head injury 2/1/2018   • Hx of long-term (current) use of anticoagulants    • Hyperlipidemia    • Hypertension    • Iron deficiency    • Mechanical back pain    • Non-small cell carcinoma of lung (CMS/HCC)    • Smoker 8/8/2016   • Stroke (CMS/HCC) 06/06/2019    left   • Vitamin D deficiency  "         Current Meds:     Current Outpatient Medications   Medication Sig Dispense Refill   • aspirin 81 MG chewable tablet Chew 1 tablet daily. 30 tablet 5   • carvedilol (COREG) 12.5 MG tablet Take 1 tablet by mouth 2 (Two) Times a Day With Meals. (Patient taking differently: Take 6.25 mg by mouth 2 (Two) Times a Day With Meals.) 60 tablet 5   • clopidogrel (PLAVIX) 75 MG tablet Take 1 tablet by mouth Daily. 30 tablet 5   • DULoxetine (CYMBALTA) 60 MG capsule TAKE 1 CAPSULE BY MOUTH ONCE DAILY 30 capsule 5   • EASY COMFORT LANCETS misc USE TO TEST BLOOD SUGAR FOUR TIMES DAILY AS DIRECTED  2   • esomeprazole (nexIUM) 40 MG capsule TAKE 1 CAPSULE BY MOUTH ONCE DAILY 30 capsule 5   • gabapentin (NEURONTIN) 300 MG capsule Take 300 mg by mouth 3 (Three) Times a Day.     • Glucose Blood (BLOOD GLUCOSE TEST) strip 1 four times daily 120 each 5   • HYDROcodone-acetaminophen (NORCO)  MG per tablet Take  by mouth As Needed.     • insulin aspart (novoLOG FLEXPEN) 100 UNIT/ML solution pen-injector sc pen Tid with meals as per sliding scale 5 pen 5   • Insulin Pen Needle 32G X 4 MM misc 1 each 4 (Four) Times a Day. 120 each 5   • Insulin Syringe 28G X 1/2\" 0.5 ML misc 2 (two) times a day.     • LEVEMIR FLEXTOUCH 100 UNIT/ML injection INJECT 45 UNITS SUBQ ONCE DAILY AS DIRECTED (Patient taking differently: Inject  under the skin into the appropriate area as directed Every Night.) 12 mL 5   • nitroglycerin (NITROSTAT) 0.4 MG SL tablet Place 1 tablet under the tongue Every 5 (Five) Minutes As Needed for Chest Pain. Take no more than 3 doses in 15 minutes. 28 tablet 12   • rosuvastatin (CRESTOR) 20 MG tablet TAKE 1 Tablet BY MOUTH EVERY DAY 30 tablet 5   • sacubitril-valsartan (ENTRESTO) 24-26 MG tablet Take 1 tablet by mouth 2 (Two) Times a Day. 60 tablet 5   • Zoster Vac Recomb Adjuvanted (SHINGRIX) 50 MCG/0.5ML reconstituted suspension Inject 50 mcg into the appropriate muscle as directed by prescriber See Admin " Instructions. Repeat in 2-6 months 1 each 1     No current facility-administered medications for this visit.         Allergies:      No Known Allergies     Past Surgical History:     Past Surgical History:   Procedure Laterality Date   • CARDIAC SURGERY      Open heart          Social History:     Social History     Socioeconomic History   • Marital status:      Spouse name: capo   • Number of children: 2   • Years of education: 8   • Highest education level: Not on file   Occupational History   • Occupation: retired   Social Needs   • Financial resource strain: Not hard at all   • Food insecurity:     Worry: Never true     Inability: Never true   • Transportation needs:     Medical: No     Non-medical: No   Tobacco Use   • Smoking status: Former Smoker     Years: 50.00     Types: Cigarettes   • Smokeless tobacco: Never Used   Substance and Sexual Activity   • Alcohol use: No   • Drug use: No   • Sexual activity: Defer   Lifestyle   • Physical activity:     Days per week: 0 days     Minutes per session: 0 min   • Stress: Very much       Family History:     Family History   Problem Relation Age of Onset   • Vision loss Other    • Coronary artery disease Other    • Diabetes Mother    • Arthritis Mother    • Heart attack Father    • Heart disease Sister    • Seizures Sister    • Heart disease Brother        Review of Systems:     Review of Systems   Constitutional: Negative.    HENT: Negative.    Eyes: Negative.    Respiratory: Positive for shortness of breath.    Cardiovascular: Positive for chest pain.   Gastrointestinal: Negative.    Endocrine: Negative.    Genitourinary: Positive for hematuria.   Musculoskeletal: Negative.    Allergic/Immunologic: Negative.    Neurological: Negative.    Hematological: Negative.    Psychiatric/Behavioral: Negative.        Physical Exam:     Physical Exam   Constitutional: He is oriented to person, place, and time. He appears well-developed and well-nourished.   HENT:    Head: Normocephalic and atraumatic.   Eyes: Pupils are equal, round, and reactive to light. Conjunctivae and EOM are normal.   Neck: Normal range of motion.   Cardiovascular: Normal rate, regular rhythm, normal heart sounds and intact distal pulses.   Pulmonary/Chest: Effort normal and breath sounds normal.   Abdominal: Soft. Bowel sounds are normal.   Genitourinary:   Genitourinary Comments: Extremely ill-appearing uncircumcised normal phallus   Musculoskeletal: Normal range of motion.   Neurological: He is alert and oriented to person, place, and time. He has normal reflexes.   Skin: Skin is warm and dry.   Psychiatric: He has a normal mood and affect. His behavior is normal. Judgment and thought content normal.   Nursing note and vitals reviewed.      I have reviewed the following portions of the patient's history: allergies, current medications, past family history, past medical history, past social history, past surgical history, problem list and ROS and confirm it's accurate.      Procedure:   Cystoscopy:  Patient presents today for cystourethroscopy.  I went ahead and obtained an informed consent including the risk of anesthesia, bleeding, infection, etc.  After prep and drape in a sterile fashion in the low dorsal lithotomy position the urethra was gently anesthetized with 10 cc of 2% viscous Xylocaine jelly.  After an appropriate period of topical anesthesia I used the Olympus digital 14 [place] flexible cystoscope to examine the anterior urethra which was completely normal, the ureteral orifices were visualized and normal in position and configuration there were no stones,  or foreign bodies.  There was obvious posterior wall bladder tumors. The patient was given 80 mg of gentamicin in an intramuscular fashion  as prophylaxis for the cystoscopy and released from the clinic.    Assessment/Plan:   Bladder tumor he has obvious bladder cancer in the face of severe cardiac disease and was told he is not a  candidate for general anesthesia.  Unfortunately without treatment this is going to continue causing significant problems with anemia and significant problems with the possibility of metastatic disease.  I will speak with Dr. Mcnair and see if we can get this scheduled or at least get something in line form.  Certainly if he needs to be referred this would be reasonable as well I will follow-up with him based on this.          Patient's Body mass index is 23.12 kg/m². BMI is within normal parameters. No follow-up required..              This document has been electronically signed by OSKAR ANDREW MD June 25, 2020 09:52

## 2020-06-25 NOTE — TELEPHONE ENCOUNTER
Patients wife called stating that he was told that he needed to see a heart doctor because of his bladder cancer. She said that she made an appointment with Dr. Marcus for patient but they said that he needed a referral.

## 2020-06-30 PROBLEM — Z01.818 PREOPERATIVE CLEARANCE: Status: ACTIVE | Noted: 2020-01-01

## 2020-07-01 NOTE — PROGRESS NOTES
subjective     Chief Complaint   Patient presents with   • Surgical Clearance     Urology   • Hypertension     Pt doing well,       History of Present Illness    Patient is 69 years old white male who has been having hematuria and is planning to have surgery.    Patient is here for cardiac clearance.    Patient has history of ischemic cardiomyopathy with chronic systolic congestive heart failure.  He is LV ejection fraction used to be around 20 to 25% however last echo a year ago had shown ejection fraction around 40 to 45%.  Patient denies any chest pain or palpitations however he complains of being weak fatigued and tired.  He also has history of cerebrovascular accident due to occlusion of right internal carotid artery  Patient is on dual antiplatelet therapy with aspirin and Plavix.    Risk factors include diabetes mellitus, hypertension and hyperlipidemia.  Patient is taking Coreg 6.25 twice daily, Crestor 20 mg daily and Entresto 24/26 twice daily.  He has been doing very well from cardiac standpoint lately.  He denies any chest pain palpitations or shortness of breath.  No orthopnea or PND.  Blood sugar has been poorly controlled  He also has chronic renal failure.    Past Surgical History:   Procedure Laterality Date   • CARDIAC SURGERY      Open heart      Family History   Problem Relation Age of Onset   • Vision loss Other    • Coronary artery disease Other    • Diabetes Mother    • Arthritis Mother    • Heart attack Father    • Heart disease Sister    • Seizures Sister    • Heart disease Brother      Past Medical History:   Diagnosis Date   • Allergic rhinitis    • Anxiety    • CAD (coronary artery disease)    • CHF (congestive heart failure) (CMS/HCC)    • COPD (chronic obstructive pulmonary disease) (CMS/HCC)    • Diabetes mellitus (CMS/HCC)     TYPE ll   • Diverticulosis    • Erectile dysfunction    • Gastritis    • GERD (gastroesophageal reflux disease)    • Head injury 2/1/2018   • Hx of long-term  (current) use of anticoagulants    • Hyperlipidemia    • Hypertension    • Iron deficiency    • Mechanical back pain    • Non-small cell carcinoma of lung (CMS/HCC)    • Smoker 8/8/2016   • Stroke (CMS/HCC) 06/06/2019    left   • Vitamin D deficiency      Patient Active Problem List   Diagnosis   • Essential hypertension   • Mixed hyperlipidemia   • CAD (coronary artery disease)   • Chronic systolic congestive heart failure (CMS/HCC)   • COPD mixed type (CMS/HCC)   • Type 2 diabetes mellitus with peripheral neuropathy (CMS/HCC)   • Vitamin D deficiency   • Mechanical back pain   • Gastroesophageal reflux disease without esophagitis   • Chronic seasonal allergic rhinitis   • Chronic anxiety   • Diverticulosis of large intestine without hemorrhage   • Vasculogenic erectile dysfunction   • Smoker   • Healthcare maintenance   • Iron deficiency anemia due to chronic blood loss   • Ischemic cardiomyopathy   • Bulging lumbar disc   • Stage 3 chronic kidney disease (CMS/HCC)   • Cerebral infarction due to occlusion of right internal carotid artery (CMS/HCC)   • PAD (peripheral artery disease) (CMS/[facility])   • Gross hematuria   • Bladder tumor   • Preoperative clearance       Social History     Tobacco Use   • Smoking status: Former Smoker     Packs/day: 1.00     Years: 50.00     Pack years: 50.00     Types: Cigarettes   • Smokeless tobacco: Never Used   Substance Use Topics   • Alcohol use: No   • Drug use: No       No Known Allergies    Current Outpatient Medications on File Prior to Visit   Medication Sig   • aspirin 81 MG chewable tablet Chew 1 tablet daily.   • clopidogrel (PLAVIX) 75 MG tablet Take 1 tablet by mouth Daily.   • DULoxetine (CYMBALTA) 60 MG capsule TAKE 1 CAPSULE BY MOUTH ONCE DAILY   • EASY COMFORT LANCETS misc USE TO TEST BLOOD SUGAR FOUR TIMES DAILY AS DIRECTED   • esomeprazole (nexIUM) 40 MG capsule TAKE 1 CAPSULE BY MOUTH ONCE DAILY   • gabapentin (NEURONTIN) 300 MG capsule Take 300 mg by mouth 3  "(Three) Times a Day.   • Glucose Blood (BLOOD GLUCOSE TEST) strip 1 four times daily   • HYDROcodone-acetaminophen (NORCO)  MG per tablet Take  by mouth As Needed.   • insulin aspart (novoLOG FLEXPEN) 100 UNIT/ML solution pen-injector sc pen Tid with meals as per sliding scale   • Insulin Pen Needle 32G X 4 MM misc 1 each 4 (Four) Times a Day.   • Insulin Syringe 28G X 1/2\" 0.5 ML misc 2 (two) times a day.   • LEVEMIR FLEXTOUCH 100 UNIT/ML injection INJECT 45 UNITS SUBQ ONCE DAILY AS DIRECTED (Patient taking differently: Inject  under the skin into the appropriate area as directed Every Night.)   • nitroglycerin (NITROSTAT) 0.4 MG SL tablet Place 1 tablet under the tongue Every 5 (Five) Minutes As Needed for Chest Pain. Take no more than 3 doses in 15 minutes.   • rosuvastatin (CRESTOR) 20 MG tablet TAKE 1 Tablet BY MOUTH EVERY DAY   • sacubitril-valsartan (ENTRESTO) 24-26 MG tablet Take 1 tablet by mouth 2 (Two) Times a Day.   • Zoster Vac Recomb Adjuvanted (SHINGRIX) 50 MCG/0.5ML reconstituted suspension Inject 50 mcg into the appropriate muscle as directed by prescriber See Admin Instructions. Repeat in 2-6 months   • carvedilol (COREG) 12.5 MG tablet Take 1 tablet by mouth 2 (Two) Times a Day With Meals. (Patient taking differently: Take 6.25 mg by mouth 2 (Two) Times a Day With Meals.)     No current facility-administered medications on file prior to visit.          The following portions of the patient's history were reviewed and updated as appropriate: allergies, current medications, past family history, past medical history, past social history, past surgical history and problem list.    Review of Systems   Constitution: Negative.   HENT: Negative.  Negative for congestion.    Eyes: Negative.    Cardiovascular: Negative.  Negative for chest pain, cyanosis, dyspnea on exertion, irregular heartbeat, leg swelling, near-syncope, orthopnea, palpitations, paroxysmal nocturnal dyspnea and syncope. " "  Respiratory: Negative.  Negative for shortness of breath.    Hematologic/Lymphatic: Negative.    Musculoskeletal: Negative.    Gastrointestinal: Negative.    Neurological: Negative.  Negative for headaches.          Objective:     /68 (BP Location: Left arm, Patient Position: Sitting, Cuff Size: Adult)   Pulse 82   Temp 98.2 °F (36.8 °C)   Ht 172.7 cm (68\")   Wt 70.3 kg (155 lb)   SpO2 97%   BMI 23.57 kg/m²   Physical Exam   Constitutional: He appears well-developed and well-nourished. No distress.   HENT:   Head: Normocephalic and atraumatic.   Mouth/Throat: Oropharynx is clear and moist. No oropharyngeal exudate.   Eyes: Pupils are equal, round, and reactive to light. Conjunctivae and EOM are normal. No scleral icterus.   Neck: Normal range of motion. Neck supple. No JVD present. No tracheal deviation present. No thyromegaly present.   Cardiovascular: Normal rate, regular rhythm, normal heart sounds and intact distal pulses. PMI is not displaced. Exam reveals no gallop, no friction rub and no decreased pulses.   No murmur heard.  Pulses:       Carotid pulses are 3+ on the right side, and 3+ on the left side.       Radial pulses are 3+ on the right side, and 3+ on the left side.   Pulmonary/Chest: Effort normal and breath sounds normal. No respiratory distress. He has no wheezes. He has no rales. He exhibits no tenderness.   Abdominal: Soft. Bowel sounds are normal. He exhibits no distension, no abdominal bruit and no mass. There is no splenomegaly or hepatomegaly. There is no tenderness. There is no rebound and no guarding.   Musculoskeletal: Normal range of motion. He exhibits no edema, tenderness or deformity.   Lymphadenopathy:     He has no cervical adenopathy.   Neurological: He is alert. He has normal reflexes. No cranial nerve deficit. He exhibits normal muscle tone. Coordination normal.   Skin: Skin is warm and dry. No rash noted. He is not diaphoretic. No erythema.   Psychiatric: He has a " normal mood and affect. His behavior is normal. Judgment and thought content normal.         Lab Review  Lab Results   Component Value Date     (L) 03/05/2020    K 4.9 03/05/2020    CL 91 (L) 03/05/2020    BUN 26 (H) 03/05/2020    CREATININE 1.47 (H) 03/05/2020    GLUCOSE 219 (H) 04/11/2017     (C) 03/05/2020    CALCIUM 9.8 03/05/2020    ALT 25 03/05/2020    ALKPHOS 78 03/05/2020    LABIL2 1.8 03/05/2020     No results found for: CKTOTAL  Lab Results   Component Value Date    WBC 10.26 03/05/2020    HGB 12.5 (L) 03/05/2020    HCT 41.8 03/05/2020     03/05/2020     Lab Results   Component Value Date    INR 1.06 12/12/2018    INR 1.89 (H) 05/11/2018    INR 2.04 (H) 02/09/2017     No results found for: MG  Lab Results   Component Value Date    TSH 1.580 03/05/2020     No results found for: BNP  Lab Results   Component Value Date    CHLPL 121 03/05/2020    CHOL 305 (H) 04/11/2017    TRIG 284 (H) 03/05/2020    HDL 33 (L) 03/05/2020    VLDL 56.8 03/05/2020    LDLHDL  04/11/2017      Comment:      Unable to calculate     Lab Results   Component Value Date    LDL 31 03/05/2020    LDL 27 08/01/2019         ECG 12 Lead  Date/Time: 7/1/2020 10:31 AM  Performed by: Kamari Marcus MD  Authorized by: Kamari Marcus MD   Comparison: compared with previous ECG from 1/28/2020  Comparison to previous ECG: PACs are new  Rhythm: sinus rhythm  Ectopy: atrial premature contractions  Rate: normal  BPM: 85  Conduction: incomplete left bundle branch block  Other findings: non-specific ST-T wave changes and left atrial abnormality               I personally viewed and interpreted the patient's LAB data         Assessment:     1. Ischemic cardiomyopathy, LV ejection fraction 26%    2. Mixed hyperlipidemia    3. Essential hypertension    4. Stage 3 chronic kidney disease (CMS/HCC)    5. Type 2 diabetes mellitus with peripheral neuropathy (CMS/HCC)    6. Bladder tumor    7. Preoperative clearance    8.  Chronic systolic congestive heart failure (CMS/HCC)          Plan:     Patient is 69 years old white male who is here for preop cardiac clearance.  He plans to have surgery for bladder tumor.  He will require general anesthesia    Will arrange echocardiogram and stress test for further evaluation.  Patient has significant bleeding from bladder tumor.  He may end up having metastatic disease.  Despite poor LV functions it is probably his best option to go through with the surgery.  This is high risk surgery but will arrange echo and stress test and will decide after the procedures are available.  Patient also has 80% right ICA stenosis and is on aspirin and Plavix.  He will need clearance from Dr. Kauffman regarding ICA stenosis  Patient will be reevaluated after echo and stress test available.  Thank you for giving me the patient reports apparently patient's cardiac care.   sincerely   JOSHUA thompson          No follow-ups on file.

## 2020-07-16 NOTE — TELEPHONE ENCOUNTER
----- Message from Kamari Marcus MD sent at 7/16/2020  2:48 PM EDT -----  Blood work does show evidence of heart failure which is not worse than before.  Waiting for other tests

## 2020-07-30 NOTE — TELEPHONE ENCOUNTER
----- Message from Kamari Marcus MD sent at 7/30/2020  4:08 PM EDT -----  Echocardiogram is about the same.  Heart strength is around 26 to 30% no change since 2017    I feel that he should go ahead have his surgery done without stress test because surgery would be considered high risk either way.

## 2020-07-31 NOTE — TELEPHONE ENCOUNTER
Called and spoke with Tesha (spouse).  Given her message per DR. Marcus.  She stated she will talk to Adria  and she will call us back to let us know if we need to cancel stress test and proceed with surgery /Dr. Hoffmann.  She agreed and v/u.

## 2020-07-31 NOTE — TELEPHONE ENCOUNTER
Zuñiga asked me to cancel the stress test for Adria halima.  I called [facility] scheduling and cancelled the test for him.

## 2020-07-31 NOTE — TELEPHONE ENCOUNTER
Mrs Zuñiga called back and stated Adria didn't want to do the stress nr the surgery.  She stated she left it up to him.

## 2020-08-17 NOTE — TELEPHONE ENCOUNTER
Pt's wife called and was wondering if there was something we could do to control the pt's pain level since being told he had bladder cancer. I looked in his chart and he was told he had a bladder tumor and we could not no do surgery due to his heart.  I told her all we could do is get him in and get a referral to a pain clinic and she said he was already in one. I told her then there was unfortunately nothing else we could do and suggested that she call his pain clinic and talk to them. She said that she would

## 2020-09-22 NOTE — PROGRESS NOTES
Chief Complaint:          Chief Complaint   Patient presents with   • Urinary Frequency   • gross hematuria       HPI:   69 y.o. male returns today he has a known bladder tumor.  I have had an attempt to clear him but apparently he has 10% ejection fraction apparently he is unable to urinate because of clots that he is on a blood thinner.  We had a long discussion he is also in a pain clinic but he is having substantial pain as well he is basically not cleared for surgery I had a long discussion with him and his wife that the ramifications of this disease and not being cleared to at least stop the bleeding is significant if he were to go into clot retention I am unable to put him to sleep and this would be a very uncomfortable procedure for him additionally, I told him that probably what I would recommend is consultation of the [facility] where anesthesia is much better and take my chances rather than have what would be a fairly miserable demise again I think about it she is going to call me and I will give a call the [facility] where they have cardiac anesthesia and at least he has a chance to be comfortable rather than a catheter on blood thinner.  Known bladder cancer      Past Medical History:        Past Medical History:   Diagnosis Date   • Allergic rhinitis    • Anxiety    • CAD (coronary artery disease)    • CHF (congestive heart failure) (CMS/[facility])    • COPD (chronic obstructive pulmonary disease) (CMS/[facility])    • Diabetes mellitus (CMS/[facility])     TYPE ll   • Diverticulosis    • Erectile dysfunction    • Gastritis    • GERD (gastroesophageal reflux disease)    • Head injury 2/1/2018   • Hx of long-term (current) use of anticoagulants    • Hyperlipidemia    • Hypertension    • Iron deficiency    • Mechanical back pain    • Non-small cell carcinoma of lung (CMS/[facility])    • Smoker 8/8/2016   • Stroke (CMS/[facility]) 06/06/2019    left   • Vitamin D deficiency          Current Meds:     Current  "Outpatient Medications   Medication Sig Dispense Refill   • aspirin 81 MG chewable tablet Chew 1 tablet daily. 30 tablet 5   • carvedilol (COREG) 12.5 MG tablet Take 1 tablet by mouth 2 (Two) Times a Day With Meals. (Patient taking differently: Take 6.25 mg by mouth 2 (Two) Times a Day With Meals.) 60 tablet 5   • clopidogrel (PLAVIX) 75 MG tablet TAKE 1 TABLET BY MOUTH ONCE DAILY 30 tablet 4   • DULoxetine (CYMBALTA) 60 MG capsule TAKE 1 CAPSULE BY MOUTH ONCE DAILY 30 capsule 5   • EASY COMFORT LANCETS misc USE TO TEST BLOOD SUGAR FOUR TIMES DAILY AS DIRECTED  2   • esomeprazole (nexIUM) 40 MG capsule Take 1 capsule by mouth Daily. 30 capsule 5   • gabapentin (NEURONTIN) 300 MG capsule Take 300 mg by mouth 3 (Three) Times a Day.     • Glucose Blood (BLOOD GLUCOSE TEST) strip 1 four times daily 120 each 5   • HYDROcodone-acetaminophen (NORCO)  MG per tablet Take  by mouth As Needed.     • insulin aspart (novoLOG FLEXPEN) 100 UNIT/ML solution pen-injector sc pen Tid with meals as per sliding scale 5 pen 5   • Insulin Pen Needle 32G X 4 MM misc 1 each 4 (Four) Times a Day. 120 each 5   • Insulin Syringe 28G X 1/2\" 0.5 ML misc 2 (two) times a day.     • LEVEMIR FLEXTOUCH 100 UNIT/ML injection INJECT 45 UNITS SUBQ ONCE DAILY AS DIRECTED 12 mL 5   • nitroglycerin (NITROSTAT) 0.4 MG SL tablet Place 1 tablet under the tongue Every 5 (Five) Minutes As Needed for Chest Pain. Take no more than 3 doses in 15 minutes. 28 tablet 12   • rosuvastatin (CRESTOR) 20 MG tablet TAKE 1 Tablet BY MOUTH EVERY DAY 30 tablet 5   • sacubitril-valsartan (ENTRESTO) 24-26 MG tablet Take 1 tablet by mouth 2 (Two) Times a Day. 60 tablet 5   • Zoster Vac Recomb Adjuvanted (SHINGRIX) 50 MCG/0.5ML reconstituted suspension Inject 50 mcg into the appropriate muscle as directed by prescriber See Admin Instructions. Repeat in 2-6 months 1 each 1     No current facility-administered medications for this visit.         Allergies:      No Known " Allergies     Past Surgical History:     Past Surgical History:   Procedure Laterality Date   • CARDIAC SURGERY      Open heart          Social History:     Social History     Socioeconomic History   • Marital status:      Spouse name: capo   • Number of children: 2   • Years of education: 8   • Highest education level: Not on file   Occupational History   • Occupation: retired   Social Needs   • Financial resource strain: Not hard at all   • Food insecurity     Worry: Never true     Inability: Never true   • Transportation needs     Medical: No     Non-medical: No   Tobacco Use   • Smoking status: Former Smoker     Packs/day: 1.00     Years: 50.00     Pack years: 50.00     Types: Cigarettes   • Smokeless tobacco: Never Used   Substance and Sexual Activity   • Alcohol use: No   • Drug use: No   • Sexual activity: Defer   Lifestyle   • Physical activity     Days per week: 0 days     Minutes per session: 0 min   • Stress: Very much       Family History:     Family History   Problem Relation Age of Onset   • Vision loss Other    • Coronary artery disease Other    • Diabetes Mother    • Arthritis Mother    • Heart attack Father    • Heart disease Sister    • Seizures Sister    • Heart disease Brother        Review of Systems:     Review of Systems   Constitutional: Negative.    HENT: Negative.    Eyes: Negative.    Respiratory: Positive for shortness of breath.    Cardiovascular: Negative.    Gastrointestinal: Negative.    Endocrine: Negative.    Genitourinary: Positive for hematuria.   Musculoskeletal: Negative.    Allergic/Immunologic: Negative.    Neurological: Negative.    Hematological: Negative.    Psychiatric/Behavioral: Negative.        Physical Exam:     Physical Exam  Vitals signs and nursing note reviewed.   Constitutional:       Appearance: He is well-developed.   HENT:      Head: Normocephalic and atraumatic.   Eyes:      Conjunctiva/sclera: Conjunctivae normal.      Pupils: Pupils are equal,  round, and reactive to light.   Neck:      Musculoskeletal: Normal range of motion.   Cardiovascular:      Rate and Rhythm: Normal rate and regular rhythm.      Heart sounds: Normal heart sounds.   Pulmonary:      Effort: Pulmonary effort is normal.      Breath sounds: Normal breath sounds.   Abdominal:      General: Bowel sounds are normal.      Palpations: Abdomen is soft.   Genitourinary:     Penis: Normal.       Comments: Thin ill with catheter draining Pyridium stained urine  Musculoskeletal: Normal range of motion.   Skin:     General: Skin is warm and dry.   Neurological:      Mental Status: He is alert and oriented to person, place, and time.      Deep Tendon Reflexes: Reflexes are normal and symmetric.   Psychiatric:         Behavior: Behavior normal.         Thought Content: Thought content normal.         Judgment: Judgment normal.         I have reviewed the following portions of the patient's history: allergies, current medications, past family history, past medical history, past social history, past surgical history, problem list and ROS and confirm it's accurate.      Procedure:       Assessment/Plan:   Bladder cancer- unfortunately, given his cardiac status he is not cleared for surgery this is problematic he is going to think about it we may make a referral of [facility] to see if we can get good anesthesia we will follow-up with him based on this            Patient's Body mass index is 23.57 kg/m². BMI is within normal parameters. No follow-up required..              This document has been electronically signed by OSKAR ANDREW MD September 22, 2020 13:47 EDT

## 2020-09-23 NOTE — TELEPHONE ENCOUNTER
Notified pt that we could not call in pain meds and with the pt being with a pain managment, his normal meds may get messed up if we call anything else in.     Pt voiced understanding and stated that his wife will call back so we can give her this message.

## 2020-09-23 NOTE — TELEPHONE ENCOUNTER
Patient is not getting pain medication from Pain Management and would like to know if Dr. Hoffmann would call him in something.

## 2020-09-28 NOTE — PROGRESS NOTES
Chief Complaint:          Chief Complaint   Patient presents with   • Discuss Med     follow up        HPI:   69 y.o. male accompanied by his wife who returns today.  He has a bladder tumor with recurrent bleeding on anticoagulation.  He is an absolute not a candidate for an anesthetic per cardiology.  He understands that he will have a catheter in permanently.  He wants pain medication as he is having substantial pain I had a very long and kia discussion about hospice care which he does not want to do I do not think he is understanding the situation fully he would like to see oncology for second opinion to see if there is anything else we can offer him short of anesthetic and resection.  I will initiate pain medication therapy but I told him he is to come in monthly and I would prefer this be done via hospice      Past Medical History:        Past Medical History:   Diagnosis Date   • Allergic rhinitis    • Anxiety    • CAD (coronary artery disease)    • CHF (congestive heart failure) (CMS/[facility])    • COPD (chronic obstructive pulmonary disease) (CMS/[facility])    • Diabetes mellitus (CMS/[facility])     TYPE ll   • Diverticulosis    • Erectile dysfunction    • Gastritis    • GERD (gastroesophageal reflux disease)    • Head injury 2/1/2018   • Hx of long-term (current) use of anticoagulants    • Hyperlipidemia    • Hypertension    • Iron deficiency    • Mechanical back pain    • Non-small cell carcinoma of lung (CMS/[facility])    • Smoker 8/8/2016   • Stroke (CMS/[facility]) 06/06/2019    left   • Vitamin D deficiency          Current Meds:     Current Outpatient Medications   Medication Sig Dispense Refill   • aspirin 81 MG chewable tablet Chew 1 tablet daily. 30 tablet 5   • carvedilol (COREG) 12.5 MG tablet Take 1 tablet by mouth 2 (Two) Times a Day With Meals. (Patient taking differently: Take 6.25 mg by mouth 2 (Two) Times a Day With Meals.) 60 tablet 5   • cefuroxime (CEFTIN) 500 MG tablet Take 1 tablet by mouth 2 (two) times a day.    "  • clopidogrel (PLAVIX) 75 MG tablet TAKE 1 TABLET BY MOUTH ONCE DAILY 30 tablet 4   • DULoxetine (CYMBALTA) 60 MG capsule TAKE 1 CAPSULE BY MOUTH ONCE DAILY 30 capsule 5   • EASY COMFORT LANCETS misc USE TO TEST BLOOD SUGAR FOUR TIMES DAILY AS DIRECTED  2   • esomeprazole (nexIUM) 40 MG capsule Take 1 capsule by mouth Daily. 30 capsule 5   • gabapentin (NEURONTIN) 300 MG capsule Take 300 mg by mouth 3 (Three) Times a Day.     • Glucose Blood (BLOOD GLUCOSE TEST) strip 1 four times daily 120 each 5   • HYDROcodone-acetaminophen (NORCO)  MG per tablet Take  by mouth As Needed.     • insulin aspart (novoLOG FLEXPEN) 100 UNIT/ML solution pen-injector sc pen Tid with meals as per sliding scale 5 pen 5   • Insulin Pen Needle 32G X 4 MM misc 1 each 4 (Four) Times a Day. 120 each 5   • Insulin Syringe 28G X 1/2\" 0.5 ML misc 2 (two) times a day.     • LEVEMIR FLEXTOUCH 100 UNIT/ML injection INJECT 45 UNITS SUBQ ONCE DAILY AS DIRECTED 12 mL 5   • nitroglycerin (NITROSTAT) 0.4 MG SL tablet Place 1 tablet under the tongue Every 5 (Five) Minutes As Needed for Chest Pain. Take no more than 3 doses in 15 minutes. 28 tablet 12   • phenazopyridine (PYRIDIUM) 200 MG tablet Take 1 tablet by mouth 3 (Three) Times a Day.     • pregabalin (LYRICA) 75 MG capsule Take 1 capsule by mouth 2 (two) times a day.     • rosuvastatin (CRESTOR) 20 MG tablet TAKE 1 Tablet BY MOUTH EVERY DAY 30 tablet 5   • sacubitril-valsartan (ENTRESTO) 24-26 MG tablet Take 1 tablet by mouth 2 (Two) Times a Day. 60 tablet 5   • Zoster Vac Recomb Adjuvanted (SHINGRIX) 50 MCG/0.5ML reconstituted suspension Inject 50 mcg into the appropriate muscle as directed by prescriber See Admin Instructions. Repeat in 2-6 months 1 each 1     No current facility-administered medications for this visit.         Allergies:      No Known Allergies     Past Surgical History:     Past Surgical History:   Procedure Laterality Date   • CARDIAC SURGERY      Open heart  "         Social History:     Social History     Socioeconomic History   • Marital status:      Spouse name: capo   • Number of children: 2   • Years of education: 8   • Highest education level: Not on file   Occupational History   • Occupation: retired   Social Needs   • Financial resource strain: Not hard at all   • Food insecurity     Worry: Never true     Inability: Never true   • Transportation needs     Medical: No     Non-medical: No   Tobacco Use   • Smoking status: Former Smoker     Packs/day: 1.00     Years: 50.00     Pack years: 50.00     Types: Cigarettes   • Smokeless tobacco: Never Used   Substance and Sexual Activity   • Alcohol use: No   • Drug use: No   • Sexual activity: Defer   Lifestyle   • Physical activity     Days per week: 0 days     Minutes per session: 0 min   • Stress: Very much       Family History:     Family History   Problem Relation Age of Onset   • Vision loss Other    • Coronary artery disease Other    • Diabetes Mother    • Arthritis Mother    • Heart attack Father    • Heart disease Sister    • Seizures Sister    • Heart disease Brother        Review of Systems:     Review of Systems   Constitutional: Negative.    HENT: Negative.    Eyes: Negative.    Respiratory: Positive for shortness of breath.    Cardiovascular: Negative.    Gastrointestinal: Negative.    Endocrine: Negative.    Musculoskeletal: Negative.    Allergic/Immunologic: Negative.    Neurological: Negative.    Hematological: Negative.    Psychiatric/Behavioral: Negative.        Physical Exam:     Physical Exam  Vitals signs and nursing note reviewed. Exam conducted with a chaperone present.   Constitutional:       Appearance: He is well-developed.   HENT:      Head: Normocephalic and atraumatic.   Eyes:      Conjunctiva/sclera: Conjunctivae normal.      Pupils: Pupils are equal, round, and reactive to light.   Neck:      Musculoskeletal: Normal range of motion.   Cardiovascular:      Rate and Rhythm: Normal  rate and regular rhythm.      Heart sounds: Normal heart sounds.   Pulmonary:      Effort: Pulmonary effort is normal.      Breath sounds: Normal breath sounds.   Abdominal:      General: Bowel sounds are normal.      Palpations: Abdomen is soft.   Musculoskeletal: Normal range of motion.   Skin:     General: Skin is warm and dry.   Neurological:      Mental Status: He is alert and oriented to person, place, and time.      Deep Tendon Reflexes: Reflexes are normal and symmetric.   Psychiatric:         Behavior: Behavior normal.         Thought Content: Thought content normal.         Judgment: Judgment normal.         I have reviewed the following portions of the patient's history: allergies, current medications, past family history, past medical history, past social history, past surgical history, problem list and ROS and confirm it's accurate.      Procedure:       Assessment/Plan:   Bladder cancer-she has a known bladder tumor with occasional bleeding on blood thinner.  He is absolutely not a candidate for anesthetic he understands if he develops clot retention this will be treated under local rather than with an anesthetic.  He also understands he is having substantial pain from the disease in which is desirous of pain medication I believe he is moribund.  However he would like an oncology referral.  I attempted to get him into hospice or palliative care but unsuccessfully as he does not see the need for it as yet  Narcotic pain medication-patient has significant acute pain that I believe would be an indication for the use of narcotic pain medication.  I discussed the significant risks of pain medication and the fact that this will be a short only option and I will give her no more than a three-day supply of pain medication.  And I will not plan long-term medication and that this will be sent to a pain clinic if at all becomes necessary.  We discussed signing a pain medication agreement and the fact that we're  going to run a state DANAE review to be sure the patient is not getting pain medication from elsewhere.  If this is the case we will not give pain medication.  As part of the patient's treatment plan of there being prescribed a controlled substance with potential for abuse.  This patient has been wait aware of the appropriate dose of such medications including, the risk for somnolence, limited ability to drive and/or safety and the significant potential for overdose.  It is been made clear that these medications are for the prescribed patient only without concomitant use of alcohol or other sepsis unless prescribed by the medical provider.  Has completed prescribing agreement detailing the terms of continue prescribing him a controlled substance.  Including monitoring Danae ports, the possibility of urine drug screens and pedal counts.  The patient is aware that we reviewed DANAE reports on a regular basis and scan them into the chart.  History and physical examination exhibited continued safe and appropriate use of controlled substances.  Also discussed the fact that the new [place] legislation allows only a three-day prescription for pain medication.  In this situation he will be referred to a chronic pain clinic.  He has end-stage cancer and therefore pain medication is indicated            Patient's Body mass index is 23.57 kg/m². BMI is within normal parameters. No follow-up required..              This document has been electronically signed by OSKAR ANDREW MD September 28, 2020 13:27 EDT

## 2020-09-29 NOTE — TELEPHONE ENCOUNTER
Oncology called because they do not know what to do for the patient without a path report.  They looked in his chart and could not find one and neither could I.

## 2020-09-29 NOTE — TELEPHONE ENCOUNTER
PLEASE ADVISE ROUTED TO DR ANDREW    I called Oncology back and told them that we did not do surgery on him because we could not get clearance on it. But we had done an in office Cysto and seen a bladder Tumor. We wanted to do a TURBT on the pt but was unable to get clearance. They said they would discuss this with the providers and call me back.      Oncology called me back and told me they could not see the pt until they had a pathology. I told Tahira.

## 2020-10-07 NOTE — ED NOTES
Pt presents to ED with urine draining around urethral catheter. Pt states he has bladder cancer newly diagnosed two weeks ago and the catheter was placed shortly after. Upon assessment no discharge present.     Anatoly Rascon RN  10/07/20 0256

## 2020-10-07 NOTE — ED NOTES
Holden catheter removed. New catheter placed per provider order. Pt tolerated well. Catheter draining without difficulty.      Anatoly Rascon, RN  10/07/20 0257

## 2020-10-07 NOTE — ED PROVIDER NOTES
Subjective   Patient is a 69-year-old male who presents the emergency department complaining of urinary catheter malfunction.  The patient states that he has had a urinary catheter for about 2 weeks.  He was diagnosed with bladder cancer approximately 2 weeks ago and the catheter was placed shortly after.  He states that for the past 2 days he has had leaking around his urinary catheter in his leg bag has not been filling up like it normally does.  The patient also complains of some lower abdominal pain, and a headache.  The patient states that his headache is located on the right side of his face and rating down the right side of his neck.  The patient denies any falls, or recent injuries.  He denies any additional symptoms at this time.          Review of Systems   Constitutional: Negative for activity change, appetite change, chills, diaphoresis, fatigue and fever.   HENT: Negative for congestion, postnasal drip, rhinorrhea, sinus pressure, sinus pain, sneezing, sore throat and tinnitus.    Eyes: Negative for discharge and itching.   Respiratory: Negative for apnea, choking, chest tightness, shortness of breath and wheezing.    Cardiovascular: Negative for chest pain, palpitations and leg swelling.   Gastrointestinal: Positive for abdominal pain. Negative for abdominal distention, diarrhea, nausea and vomiting.   Genitourinary: Negative for difficulty urinating and flank pain.   Musculoskeletal: Negative for arthralgias and back pain.   Neurological: Positive for headaches. Negative for dizziness and facial asymmetry.   Psychiatric/Behavioral: Negative for agitation and confusion.       Past Medical History:   Diagnosis Date   • Allergic rhinitis    • Anxiety    • CAD (coronary artery disease)    • CHF (congestive heart failure) (CMS/[facility])    • COPD (chronic obstructive pulmonary disease) (CMS/[facility])    • Diabetes mellitus (CMS/[facility])     TYPE ll   • Diverticulosis    • Erectile dysfunction    • Gastritis    • GERD  (gastroesophageal reflux disease)    • Head injury 2/1/2018   • Hx of long-term (current) use of anticoagulants    • Hyperlipidemia    • Hypertension    • Iron deficiency    • Mechanical back pain    • Non-small cell carcinoma of lung (CMS/HCC)    • Smoker 8/8/2016   • Stroke (CMS/HCC) 06/06/2019    left   • Vitamin D deficiency        No Known Allergies    Past Surgical History:   Procedure Laterality Date   • CARDIAC SURGERY      Open heart        Family History   Problem Relation Age of Onset   • Vision loss Other    • Coronary artery disease Other    • Diabetes Mother    • Arthritis Mother    • Heart attack Father    • Heart disease Sister    • Seizures Sister    • Heart disease Brother        Social History     Socioeconomic History   • Marital status:      Spouse name: capo   • Number of children: 2   • Years of education: 8   • Highest education level: Not on file   Occupational History   • Occupation: retired   Social Needs   • Financial resource strain: Not hard at all   • Food insecurity     Worry: Never true     Inability: Never true   • Transportation needs     Medical: No     Non-medical: No   Tobacco Use   • Smoking status: Former Smoker     Packs/day: 1.00     Years: 50.00     Pack years: 50.00     Types: Cigarettes   • Smokeless tobacco: Never Used   Substance and Sexual Activity   • Alcohol use: No   • Drug use: No   • Sexual activity: Defer   Lifestyle   • Physical activity     Days per week: 0 days     Minutes per session: 0 min   • Stress: Very much           Objective   Physical Exam  Vitals signs and nursing note reviewed.   Constitutional:       General: He is not in acute distress.     Appearance: Normal appearance. He is normal weight. He is not ill-appearing, toxic-appearing or diaphoretic.      Comments: Elderly and frail   HENT:      Head: Normocephalic and atraumatic.      Right Ear: External ear normal. There is no impacted cerumen.      Left Ear: External ear normal. There is  no impacted cerumen.      Nose: Nose normal. No congestion or rhinorrhea.      Mouth/Throat:      Mouth: Mucous membranes are moist.      Pharynx: No oropharyngeal exudate or posterior oropharyngeal erythema.   Eyes:      General: No scleral icterus.        Right eye: No discharge.         Left eye: No discharge.      Pupils: Pupils are equal, round, and reactive to light.   Neck:      Musculoskeletal: Normal range of motion and neck supple. No neck rigidity or muscular tenderness.      Vascular: No carotid bruit.   Cardiovascular:      Rate and Rhythm: Normal rate and regular rhythm.      Heart sounds: No murmur. No friction rub. No gallop.    Pulmonary:      Effort: Pulmonary effort is normal. No respiratory distress.      Breath sounds: Normal breath sounds. No stridor. No wheezing, rhonchi or rales.   Chest:      Chest wall: No tenderness.   Abdominal:      General: Abdomen is flat. There is no distension.      Palpations: There is no mass.      Tenderness: There is no abdominal tenderness. There is no guarding or rebound.      Hernia: No hernia is present.   Genitourinary:     Penis: Normal.       Comments: Leakage around urinary catheter noted.  Musculoskeletal: Normal range of motion.         General: No swelling, tenderness, deformity or signs of injury.      Right lower leg: No edema.      Left lower leg: No edema.   Lymphadenopathy:      Cervical: No cervical adenopathy.   Skin:     General: Skin is warm and dry.      Capillary Refill: Capillary refill takes less than 2 seconds.      Coloration: Skin is not jaundiced or pale.      Findings: No bruising, erythema, lesion or rash.   Neurological:      General: No focal deficit present.      Mental Status: He is alert and oriented to person, place, and time.      Cranial Nerves: No cranial nerve deficit.      Sensory: No sensory deficit.      Motor: No weakness.      Coordination: Coordination normal.      Gait: Gait normal.      Deep Tendon Reflexes:  Reflexes normal.   Psychiatric:         Mood and Affect: Mood normal.         Behavior: Behavior normal.         Procedures           ED Course                                           MDM  Number of Diagnoses or Management Options  Acute cystitis without hematuria: new and requires workup     Amount and/or Complexity of Data Reviewed  Clinical lab tests: ordered and reviewed  Tests in the radiology section of CPT®: ordered and reviewed  Tests in the medicine section of CPT®: ordered and reviewed  Independent visualization of images, tracings, or specimens: yes    Risk of Complications, Morbidity, and/or Mortality  Presenting problems: high  Diagnostic procedures: high  Management options: high    Patient Progress  Patient progress: stable      Final diagnoses:   Acute cystitis without hematuria            Karoline Arcos DO  10/07/20 0308

## 2020-10-08 NOTE — OUTREACH NOTE
Care Coordination Note    RN-ACM care coordination with Smita Referral Coordinator at [facility].  Message forwarded to PCP for consideration of order to home health for guzmán catheter maintenance and to [facility] or [facility] for hospital bed.  Request for DME provider to contact patient's spouse/caregiver to discuss co-pay prior to finalizing.     Dora Burger RN  Ambulatory     10/8/2020, 14:54 EDT

## 2020-10-08 NOTE — OUTREACH NOTE
Care Coordination Note    RN-ACM outreach to [facility].  Per staff, patient was last served in 2019 and agency has capacity to accept additional patients at this time.  Will await orders as  PCP deems appropriate.  Will need face-to-face or video visit timely.     Dora Burger RN  Ambulatory     10/8/2020, 14:52 EDT

## 2020-10-08 NOTE — OUTREACH NOTE
Patient Outreach Note    HARMAN IRIZARRY outreach to patient.  Patient had ED visit at [facility] 10/07/20.  Patient presented with c/o urinary retention.  Diagnosis description: Acute cystitis without hematuria.  Patient has guzmán catheter.  Patient was treated and discharged to home to follow with PCP.  Medication changes at discharge include addition of Cefdinir 300 mg and oxyCodone-acetaminophen.  AVS and education reviewed with patient's wife/caregiver.  She v/u and expressed need for hospital bed. DME from [facility] or any other local agency acceptable.   Home health for guzmán catheter maintenance also discussed. Wife received education and demonstration of cath irrigation at the ED but is still have problems with the leg bag.  Patient has been served by [facility] in the past and would like this agency again if possible.  Rn-ACM also provided contact information for [facility] [phone] as potential source of caregiver respite.  Rn-ACM will reach out to PCP for home health referral.     Dora Burger RN  Ambulatory     10/8/2020, 14:46 EDT

## 2020-10-19 NOTE — TELEPHONE ENCOUNTER
----- Message from Kamari Marcus MD sent at 10/19/2020  1:49 PM EDT -----  Patient has severe LV dysfunction, ejection fraction around 26% with evidence of compensated heart failure.He also has multiple other comorbid conditions including renal failure and diabetes mellitus.From cardiac standpoint he is cleared for surgery as high risk.Send clearance to Dr. Anders and Edy Kline  ----- Message -----  From: Jessica Barnes MA  Sent: 10/16/2020   3:36 PM EDT  To: Kamari Marcus MD    Are you still comfortable clearing Adria or does he need another apt?     ----- Message -----  From: Edy Kline MD  Sent: 10/16/2020   1:46 PM EDT  To: Jessica Barnes MA    Adria was found to have a bladder tumor earlier this year but dr hayden requested cardiology clearance prior to any resection  Dr Marcus has written clearance in the chart but nothing has happened.  Please double check if Dr Marcus is still comfortable clearing him from a cardiology standpoint (understanding that it is a high risk either way)

## 2020-10-21 NOTE — TELEPHONE ENCOUNTER
"Kimberley from Urology is going to get Adria scheduled and let him know as soon as she does.     ----- Message from Edy Kline MD sent at 10/20/2020 11:01 AM EDT -----  Please let mrs varghese know that both dr shipley and I have cleared jarek for bladder surgery (as a high risk) if he wishes to go ahead with it. If he does, let me know and we will reach out to dr hayden and see if agrees to schedule  ----- Message -----  From: Jessica Barnes MA  Sent: 10/19/2020   3:17 PM EDT  To: Edy Kline MD    This message is from Dr. Marcus   \"----- Message from Kamari Marcus MD sent at 10/19/2020  1:49 PM EDT -----  Patient has severe LV dysfunction, ejection fraction around 26% with evidence of compensated heart failure.He also has multiple other comorbid conditions including renal failure and diabetes mellitus.From cardiac standpoint he is cleared for surgery as high risk.Send clearance to Dr. Anders and Edy Kline\"  ----- Message -----  From: Edy Kline MD  Sent: 10/16/2020   1:46 PM EDT  To: Jessica Barnes MA    Adria was found to have a bladder tumor earlier this year but dr hayden requested cardiology clearance prior to any resection  Dr Marcus has written clearance in the chart but nothing has happened.  Please double check if Dr Marcus is still comfortable clearing him from a cardiology standpoint (understanding that it is a high risk either way)          "

## 2020-10-22 NOTE — TELEPHONE ENCOUNTER
I called the pt's wife and spoke with her to see if he wanted to continue with the procedure now that we finally have clearance. They said yes and asked for more pain medication we choose 11/16/20 and will get him scheduled for his ASAP.    ----- Message from Dairnel Hoffmann MD sent at 10/20/2020  7:24 AM EDT -----  He is now cleared for high risk surgery I am talking about Adria Zuñiga see if his wife wants us to do it thanks  ----- Message -----  From: Pau Richardson MA  Sent: 10/19/2020   2:45 PM EDT  To: Edy Kline MD, #

## 2020-10-22 NOTE — PROGRESS NOTES
He has been cleared for surgery at extreme risk but wants to proceed with it.  Because of his cancer status I went ahead and refilled his Dilaudid.  He has known cancer and he is at very high risk for removal on November 16  [place]

## 2020-11-02 NOTE — PROGRESS NOTES
Chief Complaint:          Chief Complaint   Patient presents with   • Prostate Cancer     1MTH FOLLOW UP       HPI:   69 y.o. male  returns today he has a known bladder tumor.  I have had an attempt to clear him but apparently he has 10% ejection fraction apparently he is unable to urinate because of clots that he is on a blood thinner.  We had a long discussion he is also in a pain clinic but he is having substantial pain as well he is basically not cleared for surgery I had a long discussion with him and his wife that the ramifications of this disease and not being cleared to at least stop the bleeding is significant if he were to go into clot retention I am unable to put him to sleep and this would be a very uncomfortable procedure for him additionally, I told him that probably what I would recommend is consultation of the [facility] where anesthesia is much better and take my chances rather than have what would be a fairly miserable demise again I think about it she is going to call me and I will give a call the [facility] where they have cardiac anesthesia and at least he has a chance to be comfortable rather than a catheter on blood thinner.   He returns today.  He finally got clearance from cardiology with an ejection fraction of 20%.  They would like to go to the [facility] because of cardiac anesthesia and his best chance for an attempt at least the diagnosis.  His last CT done on October 7 shows possible bony mets possible liver mets thickened very suspicious bladder.  And urinary retention with mild bilateral hydronephrosis as a consequence of this.  He is at significant risk.  He understands this.  He does not want to going to hospice.  He wants tissue diagnosis I refilled his pain medication set him up to see Dr. Armani Elmore as soon as possible      Past Medical History:        Past Medical History:   Diagnosis Date   • Allergic rhinitis    • Anxiety    • CAD (coronary  artery disease)    • CHF (congestive heart failure) (CMS/[facility])    • COPD (chronic obstructive pulmonary disease) (CMS/[facility])    • Diabetes mellitus (CMS/[facility])     TYPE ll   • Diverticulosis    • Erectile dysfunction    • Gastritis    • GERD (gastroesophageal reflux disease)    • Head injury 2/1/2018   • Hx of long-term (current) use of anticoagulants    • Hyperlipidemia    • Hypertension    • Iron deficiency    • Mechanical back pain    • Non-small cell carcinoma of lung (CMS/[facility])    • Smoker 8/8/2016   • Stroke (CMS/[facility]) 06/06/2019    left   • Vitamin D deficiency          Current Meds:     Current Outpatient Medications   Medication Sig Dispense Refill   • aspirin 81 MG chewable tablet Chew 1 tablet daily. 30 tablet 5   • carvedilol (COREG) 12.5 MG tablet Take 1 tablet by mouth 2 (Two) Times a Day With Meals. (Patient taking differently: Take 6.25 mg by mouth 2 (Two) Times a Day With Meals.) 60 tablet 5   • cefdinir (OMNICEF) 300 MG capsule Take 1 capsule by mouth 2 (Two) Times a Day. 14 capsule 0   • cefuroxime (CEFTIN) 500 MG tablet Take 1 tablet by mouth 2 (two) times a day.     • clopidogrel (PLAVIX) 75 MG tablet TAKE 1 TABLET BY MOUTH ONCE DAILY 30 tablet 4   • DULoxetine (CYMBALTA) 60 MG capsule TAKE 1 CAPSULE BY MOUTH ONCE DAILY 30 capsule 5   • EASY COMFORT LANCETS misc USE TO TEST BLOOD SUGAR FOUR TIMES DAILY AS DIRECTED  2   • esomeprazole (nexIUM) 40 MG capsule Take 1 capsule by mouth Daily. 30 capsule 5   • gabapentin (NEURONTIN) 300 MG capsule Take 300 mg by mouth 3 (Three) Times a Day.     • Glucose Blood (BLOOD GLUCOSE TEST) strip 1 four times daily 120 each 5   • HYDROcodone-acetaminophen (NORCO)  MG per tablet Take  by mouth As Needed.     • HYDROmorphone (Dilaudid) 2 MG tablet Take 1 tablet by mouth Every 6 (Six) Hours As Needed for Severe Pain . 30 tablet 0   • insulin aspart (novoLOG FLEXPEN) 100 UNIT/ML solution pen-injector sc pen Tid with meals as per sliding scale 5 pen 5   • Insulin Pen  "Needle 32G X 4 MM misc 1 each 4 (Four) Times a Day. 120 each 5   • Insulin Syringe 28G X 1/2\" 0.5 ML misc 2 (two) times a day.     • LEVEMIR FLEXTOUCH 100 UNIT/ML injection INJECT 45 UNITS SUBQ ONCE DAILY AS DIRECTED 12 mL 5   • nitroglycerin (NITROSTAT) 0.4 MG SL tablet Place 1 tablet under the tongue Every 5 (Five) Minutes As Needed for Chest Pain. Take no more than 3 doses in 15 minutes. 28 tablet 12   • oxyCODONE-acetaminophen (PERCOCET) 5-325 MG per tablet Take 1 tablet by mouth Every 8 (Eight) Hours As Needed for Moderate Pain . 12 tablet 0   • phenazopyridine (PYRIDIUM) 200 MG tablet Take 1 tablet by mouth 3 (Three) Times a Day.     • pregabalin (LYRICA) 75 MG capsule Take 1 capsule by mouth 2 (two) times a day.     • rosuvastatin (CRESTOR) 20 MG tablet TAKE 1 Tablet BY MOUTH EVERY DAY 30 tablet 5   • sacubitril-valsartan (ENTRESTO) 24-26 MG tablet Take 1 tablet by mouth 2 (Two) Times a Day. 60 tablet 5   • Zoster Vac Recomb Adjuvanted (SHINGRIX) 50 MCG/0.5ML reconstituted suspension Inject 50 mcg into the appropriate muscle as directed by prescriber See Admin Instructions. Repeat in 2-6 months 1 each 1     No current facility-administered medications for this visit.         Allergies:      No Known Allergies     Past Surgical History:     Past Surgical History:   Procedure Laterality Date   • CARDIAC SURGERY      Open heart          Social History:     Social History     Socioeconomic History   • Marital status:      Spouse name: capo   • Number of children: 2   • Years of education: 8   • Highest education level: Not on file   Occupational History   • Occupation: retired   Social Needs   • Financial resource strain: Not hard at all   • Food insecurity     Worry: Never true     Inability: Never true   • Transportation needs     Medical: No     Non-medical: No   Tobacco Use   • Smoking status: Former Smoker     Packs/day: 1.00     Years: 50.00     Pack years: 50.00     Types: Cigarettes   • Smokeless " tobacco: Never Used   Substance and Sexual Activity   • Alcohol use: No   • Drug use: No   • Sexual activity: Defer   Lifestyle   • Physical activity     Days per week: 0 days     Minutes per session: 0 min   • Stress: Very much       Family History:     Family History   Problem Relation Age of Onset   • Vision loss Other    • Coronary artery disease Other    • Diabetes Mother    • Arthritis Mother    • Heart attack Father    • Heart disease Sister    • Seizures Sister    • Heart disease Brother        Review of Systems:     Review of Systems   Constitutional: Negative.    HENT: Negative.    Eyes: Negative.    Respiratory: Negative.    Cardiovascular: Negative.    Gastrointestinal: Negative.    Endocrine: Negative.    Genitourinary: Positive for hematuria.   Musculoskeletal: Negative.    Allergic/Immunologic: Negative.    Neurological: Negative.    Hematological: Negative.    Psychiatric/Behavioral: Negative.        Physical Exam:     Physical Exam  Vitals signs and nursing note reviewed.   Constitutional:       Appearance: He is well-developed.   HENT:      Head: Normocephalic and atraumatic.   Eyes:      Conjunctiva/sclera: Conjunctivae normal.      Pupils: Pupils are equal, round, and reactive to light.   Neck:      Musculoskeletal: Normal range of motion.   Cardiovascular:      Rate and Rhythm: Normal rate and regular rhythm.      Heart sounds: Normal heart sounds.   Pulmonary:      Effort: Pulmonary effort is normal.      Breath sounds: Normal breath sounds.   Abdominal:      General: Bowel sounds are normal.      Palpations: Abdomen is soft.   Genitourinary:     Comments: Moribund appearing  Musculoskeletal: Normal range of motion.   Skin:     General: Skin is warm and dry.   Neurological:      Mental Status: He is alert and oriented to person, place, and time.      Deep Tendon Reflexes: Reflexes are normal and symmetric.   Psychiatric:         Behavior: Behavior normal.         Thought Content: Thought  content normal.         Judgment: Judgment normal.         I have reviewed the following portions of the patient's history: allergies, current medications, past family history, past medical history, past social history, past surgical history, problem list and ROS and confirm it's accurate.      Procedure:       Assessment/Plan:   Bladder cancer-do not have a tissue diagnosis he was just cleared by cardiology for intervention with at .  I will make a referral did refill his pain medication because he has known cancer            Patient's Body mass index is 21.59 kg/m². BMI is within normal parameters. No follow-up required..              This document has been electronically signed by OSKAR ANDREW MD November 2, 2020 13:23 EST
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
